# Patient Record
Sex: FEMALE | Race: WHITE | NOT HISPANIC OR LATINO | Employment: OTHER | URBAN - METROPOLITAN AREA
[De-identification: names, ages, dates, MRNs, and addresses within clinical notes are randomized per-mention and may not be internally consistent; named-entity substitution may affect disease eponyms.]

---

## 2020-03-16 ENCOUNTER — APPOINTMENT (OUTPATIENT)
Dept: CARDIOLOGY | Facility: HOSPITAL | Age: 85
End: 2020-03-16

## 2020-03-16 ENCOUNTER — APPOINTMENT (OUTPATIENT)
Dept: GENERAL RADIOLOGY | Facility: HOSPITAL | Age: 85
End: 2020-03-16

## 2020-03-16 ENCOUNTER — HOSPITAL ENCOUNTER (INPATIENT)
Facility: HOSPITAL | Age: 85
LOS: 2 days | Discharge: HOME | End: 2020-03-18
Attending: EMERGENCY MEDICINE | Admitting: INTERNAL MEDICINE

## 2020-03-16 DIAGNOSIS — I48.91 NEW ONSET A-FIB (HCC): Primary | ICD-10-CM

## 2020-03-16 DIAGNOSIS — I48.91 ATRIAL FIBRILLATION WITH RAPID VENTRICULAR RESPONSE (HCC): ICD-10-CM

## 2020-03-16 LAB
ALBUMIN SERPL-MCNC: 3.6 G/DL (ref 3.5–5.2)
ALBUMIN/GLOB SERPL: 1.2 G/DL
ALP SERPL-CCNC: 84 U/L (ref 39–117)
ALT SERPL W P-5'-P-CCNC: 19 U/L (ref 1–33)
ANION GAP SERPL CALCULATED.3IONS-SCNC: 10 MMOL/L (ref 5–15)
AORTIC DIMENSIONLESS INDEX: 0.4 (DI)
AST SERPL-CCNC: 18 U/L (ref 1–32)
BASOPHILS # BLD AUTO: 0.05 10*3/MM3 (ref 0–0.2)
BASOPHILS NFR BLD AUTO: 0.5 % (ref 0–1.5)
BH CV ECHO MEAS - AI DEC SLOPE: 295.1 CM/SEC^2
BH CV ECHO MEAS - AI MAX PG: 77.5 MMHG
BH CV ECHO MEAS - AI MAX VEL: 440.1 CM/SEC
BH CV ECHO MEAS - AI P1/2T: 436.8 MSEC
BH CV ECHO MEAS - AO MAX PG (FULL): 16.5 MMHG
BH CV ECHO MEAS - AO MAX PG: 20.6 MMHG
BH CV ECHO MEAS - AO MEAN PG (FULL): 7.3 MMHG
BH CV ECHO MEAS - AO MEAN PG: 9 MMHG
BH CV ECHO MEAS - AO ROOT AREA (BSA CORRECTED): 1.7
BH CV ECHO MEAS - AO ROOT AREA: 6.5 CM^2
BH CV ECHO MEAS - AO ROOT DIAM: 2.9 CM
BH CV ECHO MEAS - AO V2 MAX: 227 CM/SEC
BH CV ECHO MEAS - AO V2 MEAN: 140 CM/SEC
BH CV ECHO MEAS - AO V2 VTI: 36.6 CM
BH CV ECHO MEAS - AVA(I,A): 1.3 CM^2
BH CV ECHO MEAS - AVA(I,D): 1.4 CM^2
BH CV ECHO MEAS - AVA(V,A): 1.4 CM^2
BH CV ECHO MEAS - AVA(V,D): 1.4 CM^2
BH CV ECHO MEAS - BSA(HAYCOCK): 1.8 M^2
BH CV ECHO MEAS - BSA: 1.7 M^2
BH CV ECHO MEAS - BZI_BMI: 27.6 KILOGRAMS/M^2
BH CV ECHO MEAS - BZI_METRIC_HEIGHT: 160 CM
BH CV ECHO MEAS - BZI_METRIC_WEIGHT: 70.8 KG
BH CV ECHO MEAS - EDV(CUBED): 61.4 ML
BH CV ECHO MEAS - EDV(TEICH): 67.7 ML
BH CV ECHO MEAS - EF(CUBED): 72.6 %
BH CV ECHO MEAS - EF(TEICH): 65 %
BH CV ECHO MEAS - ESV(CUBED): 16.8 ML
BH CV ECHO MEAS - ESV(TEICH): 23.7 ML
BH CV ECHO MEAS - FS: 35.1 %
BH CV ECHO MEAS - IVS/LVPW: 1
BH CV ECHO MEAS - IVSD: 1.1 CM
BH CV ECHO MEAS - LA DIMENSION: 4.3 CM
BH CV ECHO MEAS - LA/AO: 1.5
BH CV ECHO MEAS - LAD MAJOR: 4.7 CM
BH CV ECHO MEAS - LAT PEAK E' VEL: 13.3 CM/SEC
BH CV ECHO MEAS - LATERAL E/E' RATIO: 7.1
BH CV ECHO MEAS - LV MASS(C)D: 130.4 GRAMS
BH CV ECHO MEAS - LV MASS(C)DI: 74.9 GRAMS/M^2
BH CV ECHO MEAS - LV MAX PG: 4.1 MMHG
BH CV ECHO MEAS - LV MEAN PG: 1.7 MMHG
BH CV ECHO MEAS - LV V1 MAX: 100.7 CM/SEC
BH CV ECHO MEAS - LV V1 MEAN: 57.9 CM/SEC
BH CV ECHO MEAS - LV V1 VTI: 15.1 CM
BH CV ECHO MEAS - LVIDD: 3.9 CM
BH CV ECHO MEAS - LVIDS: 2.6 CM
BH CV ECHO MEAS - LVOT AREA (M): 3.1 CM^2
BH CV ECHO MEAS - LVOT AREA: 3.1 CM^2
BH CV ECHO MEAS - LVOT DIAM: 2 CM
BH CV ECHO MEAS - LVPWD: 1 CM
BH CV ECHO MEAS - MED PEAK E' VEL: 5 CM/SEC
BH CV ECHO MEAS - MEDIAL E/E' RATIO: 18.4
BH CV ECHO MEAS - MV DEC SLOPE: 633.1 CM/SEC^2
BH CV ECHO MEAS - MV DEC TIME: 0.16 SEC
BH CV ECHO MEAS - MV E MAX VEL: 95.3 CM/SEC
BH CV ECHO MEAS - MV P1/2T MAX VEL: 124.9 CM/SEC
BH CV ECHO MEAS - MV P1/2T: 57.8 MSEC
BH CV ECHO MEAS - MVA P1/2T LCG: 1.8 CM^2
BH CV ECHO MEAS - MVA(P1/2T): 3.8 CM^2
BH CV ECHO MEAS - PA ACC SLOPE: 614.3 CM/SEC^2
BH CV ECHO MEAS - PA ACC TIME: 0.12 SEC
BH CV ECHO MEAS - PA PR(ACCEL): 25.1 MMHG
BH CV ECHO MEAS - RAP SYSTOLE: 3 MMHG
BH CV ECHO MEAS - RV MAX PG: 1.9 MMHG
BH CV ECHO MEAS - RV V1 MAX: 68.1 CM/SEC
BH CV ECHO MEAS - RVSP: 27 MMHG
BH CV ECHO MEAS - SI(AO): 137.2 ML/M^2
BH CV ECHO MEAS - SI(CUBED): 25.6 ML/M^2
BH CV ECHO MEAS - SI(LVOT): 27 ML/M^2
BH CV ECHO MEAS - SI(TEICH): 25.3 ML/M^2
BH CV ECHO MEAS - SV(AO): 238.7 ML
BH CV ECHO MEAS - SV(CUBED): 44.6 ML
BH CV ECHO MEAS - SV(LVOT): 47 ML
BH CV ECHO MEAS - SV(TEICH): 44 ML
BH CV ECHO MEAS - TAPSE (>1.6): 1.6 CM2
BH CV ECHO MEAS - TR MAX PG: 24 MMHG
BH CV ECHO MEAS - TR MAX VEL: 243.1 CM/SEC
BH CV ECHO MEASUREMENTS AVERAGE E/E' RATIO: 10.42
BH CV XLRA - RV BASE: 3.7 CM
BH CV XLRA - RV LENGTH: 6 CM
BH CV XLRA - RV MID: 2.6 CM
BH CV XLRA - TDI S': 12.1 CM/SEC
BILIRUB SERPL-MCNC: 0.3 MG/DL (ref 0.2–1.2)
BUN BLD-MCNC: 24 MG/DL (ref 8–23)
BUN/CREAT SERPL: 20.5 (ref 7–25)
CALCIUM SPEC-SCNC: 9.3 MG/DL (ref 8.6–10.5)
CHLORIDE SERPL-SCNC: 102 MMOL/L (ref 98–107)
CO2 SERPL-SCNC: 28 MMOL/L (ref 22–29)
CREAT BLD-MCNC: 1.17 MG/DL (ref 0.57–1)
D DIMER PPP FEU-MCNC: 3.4 MCGFEU/ML (ref 0–0.56)
DEPRECATED RDW RBC AUTO: 43.8 FL (ref 37–54)
EOSINOPHIL # BLD AUTO: 0.11 10*3/MM3 (ref 0–0.4)
EOSINOPHIL NFR BLD AUTO: 1.1 % (ref 0.3–6.2)
ERYTHROCYTE [DISTWIDTH] IN BLOOD BY AUTOMATED COUNT: 13.4 % (ref 12.3–15.4)
GFR SERPL CREATININE-BSD FRML MDRD: 44 ML/MIN/1.73
GLOBULIN UR ELPH-MCNC: 3.1 GM/DL
GLUCOSE BLD-MCNC: 99 MG/DL (ref 65–99)
HCT VFR BLD AUTO: 35.8 % (ref 34–46.6)
HGB BLD-MCNC: 11 G/DL (ref 12–15.9)
HOLD SPECIMEN: NORMAL
HOLD SPECIMEN: NORMAL
IMM GRANULOCYTES # BLD AUTO: 0.12 10*3/MM3 (ref 0–0.05)
IMM GRANULOCYTES NFR BLD AUTO: 1.2 % (ref 0–0.5)
LEFT ATRIUM VOLUME INDEX: 45 ML/M^2
LEFT ATRIUM VOLUME: 40 ML
LV EF 2D ECHO EST: 55 %
LYMPHOCYTES # BLD AUTO: 1.14 10*3/MM3 (ref 0.7–3.1)
LYMPHOCYTES NFR BLD AUTO: 11.4 % (ref 19.6–45.3)
MAGNESIUM SERPL-MCNC: 2.4 MG/DL (ref 1.6–2.4)
MAXIMAL PREDICTED HEART RATE: 133 BPM
MCH RBC QN AUTO: 27.4 PG (ref 26.6–33)
MCHC RBC AUTO-ENTMCNC: 30.7 G/DL (ref 31.5–35.7)
MCV RBC AUTO: 89.1 FL (ref 79–97)
MONOCYTES # BLD AUTO: 0.73 10*3/MM3 (ref 0.1–0.9)
MONOCYTES NFR BLD AUTO: 7.3 % (ref 5–12)
NEUTROPHILS # BLD AUTO: 7.88 10*3/MM3 (ref 1.7–7)
NEUTROPHILS NFR BLD AUTO: 78.5 % (ref 42.7–76)
NRBC BLD AUTO-RTO: 0 /100 WBC (ref 0–0.2)
NT-PROBNP SERPL-MCNC: 4859 PG/ML (ref 5–1800)
NT-PROBNP SERPL-MCNC: 5426 PG/ML (ref 5–1800)
PLATELET # BLD AUTO: 355 10*3/MM3 (ref 140–450)
PMV BLD AUTO: 9.8 FL (ref 6–12)
POTASSIUM BLD-SCNC: 4.7 MMOL/L (ref 3.5–5.2)
PROT SERPL-MCNC: 6.7 G/DL (ref 6–8.5)
RBC # BLD AUTO: 4.02 10*6/MM3 (ref 3.77–5.28)
SODIUM BLD-SCNC: 140 MMOL/L (ref 136–145)
STRESS TARGET HR: 113 BPM
TROPONIN T SERPL-MCNC: <0.01 NG/ML (ref 0–0.03)
TSH SERPL DL<=0.05 MIU/L-ACNC: 1.73 UIU/ML (ref 0.27–4.2)
WBC NRBC COR # BLD: 10.03 10*3/MM3 (ref 3.4–10.8)
WHOLE BLOOD HOLD SPECIMEN: NORMAL
WHOLE BLOOD HOLD SPECIMEN: NORMAL

## 2020-03-16 PROCEDURE — 85025 COMPLETE CBC W/AUTO DIFF WBC: CPT | Performed by: EMERGENCY MEDICINE

## 2020-03-16 PROCEDURE — 93005 ELECTROCARDIOGRAM TRACING: CPT | Performed by: EMERGENCY MEDICINE

## 2020-03-16 PROCEDURE — 80053 COMPREHEN METABOLIC PANEL: CPT | Performed by: EMERGENCY MEDICINE

## 2020-03-16 PROCEDURE — 83880 ASSAY OF NATRIURETIC PEPTIDE: CPT | Performed by: NURSE PRACTITIONER

## 2020-03-16 PROCEDURE — 85379 FIBRIN DEGRADATION QUANT: CPT | Performed by: INTERNAL MEDICINE

## 2020-03-16 PROCEDURE — 93306 TTE W/DOPPLER COMPLETE: CPT

## 2020-03-16 PROCEDURE — 84484 ASSAY OF TROPONIN QUANT: CPT | Performed by: EMERGENCY MEDICINE

## 2020-03-16 PROCEDURE — 71045 X-RAY EXAM CHEST 1 VIEW: CPT

## 2020-03-16 PROCEDURE — 99223 1ST HOSP IP/OBS HIGH 75: CPT | Performed by: INTERNAL MEDICINE

## 2020-03-16 PROCEDURE — 84443 ASSAY THYROID STIM HORMONE: CPT | Performed by: EMERGENCY MEDICINE

## 2020-03-16 PROCEDURE — 83880 ASSAY OF NATRIURETIC PEPTIDE: CPT | Performed by: EMERGENCY MEDICINE

## 2020-03-16 PROCEDURE — 25010000002 DIGOXIN PER 500 MCG: Performed by: INTERNAL MEDICINE

## 2020-03-16 PROCEDURE — 83735 ASSAY OF MAGNESIUM: CPT | Performed by: EMERGENCY MEDICINE

## 2020-03-16 PROCEDURE — 93306 TTE W/DOPPLER COMPLETE: CPT | Performed by: INTERNAL MEDICINE

## 2020-03-16 PROCEDURE — 25010000002 FUROSEMIDE PER 20 MG: Performed by: INTERNAL MEDICINE

## 2020-03-16 PROCEDURE — 99285 EMERGENCY DEPT VISIT HI MDM: CPT

## 2020-03-16 RX ORDER — FUROSEMIDE 20 MG/1
20 TABLET ORAL 2 TIMES DAILY
COMMUNITY
End: 2020-03-18 | Stop reason: HOSPADM

## 2020-03-16 RX ORDER — DIGOXIN 0.25 MG/ML
250 INJECTION INTRAMUSCULAR; INTRAVENOUS ONCE
Status: COMPLETED | OUTPATIENT
Start: 2020-03-16 | End: 2020-03-16

## 2020-03-16 RX ORDER — POTASSIUM CHLORIDE 750 MG/1
20 CAPSULE, EXTENDED RELEASE ORAL DAILY
Status: DISCONTINUED | OUTPATIENT
Start: 2020-03-16 | End: 2020-03-18 | Stop reason: HOSPADM

## 2020-03-16 RX ORDER — FUROSEMIDE 10 MG/ML
40 INJECTION INTRAMUSCULAR; INTRAVENOUS ONCE
Status: COMPLETED | OUTPATIENT
Start: 2020-03-16 | End: 2020-03-16

## 2020-03-16 RX ORDER — VITAMIN E 268 MG
400 CAPSULE ORAL DAILY
COMMUNITY

## 2020-03-16 RX ORDER — CALCIUM CARBONATE 200(500)MG
1 TABLET,CHEWABLE ORAL DAILY
COMMUNITY

## 2020-03-16 RX ORDER — DILTIAZEM HYDROCHLORIDE 5 MG/ML
10 INJECTION INTRAVENOUS ONCE
Status: COMPLETED | OUTPATIENT
Start: 2020-03-16 | End: 2020-03-16

## 2020-03-16 RX ORDER — SODIUM CHLORIDE 0.9 % (FLUSH) 0.9 %
10 SYRINGE (ML) INJECTION AS NEEDED
Status: DISCONTINUED | OUTPATIENT
Start: 2020-03-16 | End: 2020-03-18 | Stop reason: HOSPADM

## 2020-03-16 RX ORDER — SENNOSIDES 8.6 MG
CAPSULE ORAL
COMMUNITY

## 2020-03-16 RX ORDER — TORSEMIDE 20 MG/1
20 TABLET ORAL DAILY
Status: DISCONTINUED | OUTPATIENT
Start: 2020-03-16 | End: 2020-03-16

## 2020-03-16 RX ORDER — DILTIAZEM HCL IN NACL,ISO-OSM 125 MG/125
5-15 PLASTIC BAG, INJECTION (ML) INTRAVENOUS
Status: DISCONTINUED | OUTPATIENT
Start: 2020-03-16 | End: 2020-03-18 | Stop reason: HOSPADM

## 2020-03-16 RX ORDER — BENAZEPRIL HYDROCHLORIDE 20 MG/1
20 TABLET ORAL DAILY
COMMUNITY
End: 2020-03-18 | Stop reason: HOSPADM

## 2020-03-16 RX ADMIN — DIGOXIN 250 MCG: 0.25 INJECTION INTRAMUSCULAR; INTRAVENOUS at 21:13

## 2020-03-16 RX ADMIN — DIGOXIN 250 MCG: 0.25 INJECTION INTRAMUSCULAR; INTRAVENOUS at 16:05

## 2020-03-16 RX ADMIN — APIXABAN 5 MG: 5 TABLET, FILM COATED ORAL at 21:13

## 2020-03-16 RX ADMIN — Medication 5 MG/HR: at 12:47

## 2020-03-16 RX ADMIN — POTASSIUM CHLORIDE 20 MEQ: 10 CAPSULE, COATED, EXTENDED RELEASE ORAL at 16:05

## 2020-03-16 RX ADMIN — FUROSEMIDE 40 MG: 10 INJECTION, SOLUTION INTRAMUSCULAR; INTRAVENOUS at 16:05

## 2020-03-16 RX ADMIN — APIXABAN 5 MG: 5 TABLET, FILM COATED ORAL at 12:42

## 2020-03-16 RX ADMIN — DILTIAZEM HYDROCHLORIDE 10 MG: 5 INJECTION INTRAVENOUS at 12:43

## 2020-03-16 RX ADMIN — METOPROLOL TARTRATE 25 MG: 25 TABLET, FILM COATED ORAL at 21:13

## 2020-03-16 NOTE — ED PROVIDER NOTES
Subjective   Ms. Amanda Glover is a 87 y.o. female who presents to the ED with c/o palpitations. Her daughter came to her house this morning to assist her with medications and when she checked her blood pressure it was lower than normal and she noted an irregular heart rate. She was seen by her PCP this morning and ECG showed atrial fibrillation with a heart rate of 130 and her blood pressure was 77 systolic so she was directed to present to the ED. Patient and family are unsure exactly when this onset. Patient reports she feels normal today and denies nausea, lightheadedness, chest pain, or shortness of breath. She does not have a history of atrial fibrillation. She had a fall in January which caused a femoral fracture but she has been recovering. There are no other acute symptoms at this time.      History provided by:  Patient  Palpitations   Palpitations quality:  Fast  Onset quality:  Insidious  Duration:  1 day  Timing:  Constant  Progression:  Unchanged  Chronicity:  New  Relieved by:  None tried  Worsened by:  Nothing  Ineffective treatments:  None tried  Associated symptoms: no chest pain, no dizziness, no nausea, no shortness of breath and no vomiting        Review of Systems   Constitutional: Negative for fatigue.   Eyes: Negative for visual disturbance.   Respiratory: Negative for shortness of breath.    Cardiovascular: Positive for palpitations and leg swelling. Negative for chest pain.   Gastrointestinal: Negative for diarrhea, nausea and vomiting.   Neurological: Negative for dizziness and light-headedness.   All other systems reviewed and are negative.      No past medical history on file.    Allergies   Allergen Reactions   • Influenza Vaccine Live Other (See Comments)     Patient unsure       No past surgical history on file.    No family history on file.    Social History     Socioeconomic History   • Marital status:      Spouse name: Not on file   • Number of children: Not on file   • Years  of education: Not on file   • Highest education level: Not on file         Objective   Physical Exam   Constitutional: She is oriented to person, place, and time. She appears well-developed and well-nourished. No distress.   HENT:   Head: Normocephalic and atraumatic.   Nose: Nose normal.   Eyes: Conjunctivae are normal. No scleral icterus.   Neck: Normal range of motion. Neck supple.   Cardiovascular: Normal heart sounds. An irregularly irregular rhythm present. Tachycardia present.   No murmur heard.  Pulmonary/Chest: Effort normal and breath sounds normal. No respiratory distress.   Abdominal: Soft. There is no tenderness.   Musculoskeletal: Normal range of motion. She exhibits edema (2+ bilateral lower extremities).   Neurological: She is alert and oriented to person, place, and time.   Skin: Skin is warm and dry.   Psychiatric: She has a normal mood and affect. Her behavior is normal.   Nursing note and vitals reviewed.      Procedures         ED Course     Recent Results (from the past 24 hour(s))   Comprehensive Metabolic Panel    Collection Time: 03/16/20 12:17 PM   Result Value Ref Range    Glucose 99 65 - 99 mg/dL    BUN 24 (H) 8 - 23 mg/dL    Creatinine 1.17 (H) 0.57 - 1.00 mg/dL    Sodium 140 136 - 145 mmol/L    Potassium 4.7 3.5 - 5.2 mmol/L    Chloride 102 98 - 107 mmol/L    CO2 28.0 22.0 - 29.0 mmol/L    Calcium 9.3 8.6 - 10.5 mg/dL    Total Protein 6.7 6.0 - 8.5 g/dL    Albumin 3.60 3.50 - 5.20 g/dL    ALT (SGPT) 19 1 - 33 U/L    AST (SGOT) 18 1 - 32 U/L    Alkaline Phosphatase 84 39 - 117 U/L    Total Bilirubin 0.3 0.2 - 1.2 mg/dL    eGFR Non African Amer 44 (L) >60 mL/min/1.73    Globulin 3.1 gm/dL    A/G Ratio 1.2 g/dL    BUN/Creatinine Ratio 20.5 7.0 - 25.0    Anion Gap 10.0 5.0 - 15.0 mmol/L   Magnesium    Collection Time: 03/16/20 12:17 PM   Result Value Ref Range    Magnesium 2.4 1.6 - 2.4 mg/dL   Troponin    Collection Time: 03/16/20 12:17 PM   Result Value Ref Range    Troponin T <0.010  0.000 - 0.030 ng/mL   TSH    Collection Time: 03/16/20 12:17 PM   Result Value Ref Range    TSH 1.730 0.270 - 4.200 uIU/mL   BNP    Collection Time: 03/16/20 12:17 PM   Result Value Ref Range    proBNP 4,859.0 (H) 5.0-1,800.0 pg/mL   Light Blue Top    Collection Time: 03/16/20 12:17 PM   Result Value Ref Range    Extra Tube hold for add-on    Green Top (Gel)    Collection Time: 03/16/20 12:17 PM   Result Value Ref Range    Extra Tube Hold for add-ons.    Lavender Top    Collection Time: 03/16/20 12:17 PM   Result Value Ref Range    Extra Tube hold for add-on    Gold Top - SST    Collection Time: 03/16/20 12:17 PM   Result Value Ref Range    Extra Tube Hold for add-ons.    CBC Auto Differential    Collection Time: 03/16/20 12:17 PM   Result Value Ref Range    WBC 10.03 3.40 - 10.80 10*3/mm3    RBC 4.02 3.77 - 5.28 10*6/mm3    Hemoglobin 11.0 (L) 12.0 - 15.9 g/dL    Hematocrit 35.8 34.0 - 46.6 %    MCV 89.1 79.0 - 97.0 fL    MCH 27.4 26.6 - 33.0 pg    MCHC 30.7 (L) 31.5 - 35.7 g/dL    RDW 13.4 12.3 - 15.4 %    RDW-SD 43.8 37.0 - 54.0 fl    MPV 9.8 6.0 - 12.0 fL    Platelets 355 140 - 450 10*3/mm3    Neutrophil % 78.5 (H) 42.7 - 76.0 %    Lymphocyte % 11.4 (L) 19.6 - 45.3 %    Monocyte % 7.3 5.0 - 12.0 %    Eosinophil % 1.1 0.3 - 6.2 %    Basophil % 0.5 0.0 - 1.5 %    Immature Grans % 1.2 (H) 0.0 - 0.5 %    Neutrophils, Absolute 7.88 (H) 1.70 - 7.00 10*3/mm3    Lymphocytes, Absolute 1.14 0.70 - 3.10 10*3/mm3    Monocytes, Absolute 0.73 0.10 - 0.90 10*3/mm3    Eosinophils, Absolute 0.11 0.00 - 0.40 10*3/mm3    Basophils, Absolute 0.05 0.00 - 0.20 10*3/mm3    Immature Grans, Absolute 0.12 (H) 0.00 - 0.05 10*3/mm3    nRBC 0.0 0.0 - 0.2 /100 WBC   D-dimer, Quantitative    Collection Time: 03/16/20 12:17 PM   Result Value Ref Range    D-Dimer, Quantitative 3.40 (H) 0.00 - 0.56 MCGFEU/mL     Note: In addition to lab results from this visit, the labs listed above may include labs taken at another facility or during a  different encounter within the last 24 hours. Please correlate lab times with ED admission and discharge times for further clarification of the services performed during this visit.    XR Chest 1 View   Preliminary Result   1.  Borderline cardiomegaly.   2.  Mild nonspecific basilar interstitial changes and perhaps minimal   pleural effusions. No other evidence of active chest disease is seen.       D:  03/16/2020   E:  03/16/2020                     Vitals:    03/16/20 1430 03/16/20 1450 03/16/20 1451 03/16/20 1500   BP: 121/82 112/92  113/95   BP Location:       Patient Position:       Pulse: (!) 130  (!) 134 (!) 142   Resp:       Temp:       TempSrc:       SpO2: 97%  95% 95%   Weight:       Height:         Medications   sodium chloride 0.9 % flush 10 mL (has no administration in time range)   dilTIAZem (CARDIZEM) 125 mg in 125 mL 0.7% sodium chloride (1 mg/mL) infusion (0 mg/hr Intravenous Stopped 3/16/20 1333)   apixaban (ELIQUIS) tablet 5 mg (has no administration in time range)   metoprolol tartrate (LOPRESSOR) tablet 25 mg (has no administration in time range)   torsemide (DEMADEX) tablet 20 mg (has no administration in time range)   potassium chloride (MICRO-K) CR capsule 20 mEq (has no administration in time range)   apixaban (ELIQUIS) tablet 5 mg (5 mg Oral Given 3/16/20 1242)   dilTIAZem (CARDIZEM) injection 10 mg (10 mg Intravenous Given 3/16/20 1243)     ECG/EMG Results (last 24 hours)     Procedure Component Value Units Date/Time    ECG 12 Lead [[id number]] Collected:  03/16/20 1210     Updated:  03/16/20 1220        ECG 12 Lead           Cardiology consult placed through [place] at 1322.     Cardiology to admit to tele to achieve rate control.                            [id number] Score (for atrial fibrillation stroke risk) reviewed and/or performed as part of the patient evaluation and treatment planning process.  The result associated with this review/performance is: 3           MDM    Final  diagnoses:   New onset a-fib (CMS/HCC)   Atrial fibrillation with rapid ventricular response (CMS/HCC)       Documentation assistance provided by dianne Quezada.  Information recorded by the scribe was done at my direction and has been verified and validated by me.     Zeke Quezada  03/16/20 1302       Mariana Guardado  03/16/20 [id number]       Elissa Israel APRN  03/16/20 [id number]

## 2020-03-16 NOTE — H&P
Amanda Glover  [id number]  10/31/1932   LOS: 0 days   Patient Care Team:  PHYSICIAN: Denis Lr MD   ORTHOPEDIC SURGEON: Fernando Cornell MD    Ms. 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     Chief Complaint:  PAF with RVR    Problem List:  1. New onset paroxysmal atrial fibrillation with RVR  a. CHADsVasc 3, now on Eliquis  b. Asymptomatic  2. Hypertension  3. Lower extremity edema  4. Surgical history:  a. Appendectomy  b. Hysterectomy  c. Hernia repair  d. Left hip fracture      Allergies   Allergen Reactions   • Influenza Vaccine Live Other (See Comments)     Patient unsure       (Not in a hospital admission)  Scheduled Meds:   Continuous Infusions:    dilTIAZem 5-15 mg/hr Last Rate: Stopped (03/16/20 1333)     PRN Meds:.sodium chloride       History of Present Illness:    Patient is an 87 year old white female who presents to [facility] ED after her daughter took her blood pressure and noticed that her heart rate was elevated and her blood pressure was lower than normal. She was seen by her PCP this morning and the ECG demonstrated atrial fib with RVR and rates 130bpm. Her blood pressure at that time was 77 systolic and she was directed to go to the ED. She denies chest pain, SOB, palpitations, nausea, dizziness, presyncope, or syncope. She had a mechanical fall in January 2020 where she sustained a femoral fracture and had hip surgery at [facility].  Her daughter denies during her mother's hospitalization that she went into atrial fib anytime at [facility] when she was there for her surgery.  However, she did have an echocardiogram preoperatively at that time and was not told of any abnormalities.  The patient denies any MIs, stress tests, heart catheterizations, heart monitors, diabetes mellitus, CVAs, TIAs, seizures, DVTs, PEs, GERD, thyroid dysfunction, cardiomegaly, orthopnea, COPD, asthma, rheumatic fever, or arrhythmias.  She was given  "a diltiazem 10mg IV bolus and Eliquis 5mg in the ED. The patient is unaware that she is in atrial fib with RVR and would like to go home.  She has never been a smoker.  She has been told that she has a heart murmur and possibly mitral valve prolapse.  Since the patient had hip surgery in January 2020, she has noticed that she has had bilateral lower extremity edema and she has been compliant with her Lasix daily.  She takes 20 mg at home and during 1 of her rehab stay is required extra doses and her daughter state that did reduce the edema.  However it is fairly persistent on 20 mg daily at home    Cardiac risk factors: advanced age (older than 55 for men, 65 for women), dyslipidemia and hypertension.    Social History     Socioeconomic History   • Marital status:      Spouse name: Not on file   • Number of children: 7   • Years of education: Not on file   • Highest education level: Not on file     No family history on file.    Review of Systems  10 point review of systems was completed, positives outlined in the HPI, and otherwise all other systems are negative.      Objective:       Physical Exam  /95   Pulse (!) 142   Temp 98.8 °F (37.1 °C) (Oral)   Resp 20   Ht 160 cm (63\")   Wt 70.8 kg (156 lb)   SpO2 95%   BMI 27.63 kg/m²       03/16/20  1203   Weight: 70.8 kg (156 lb)     Body mass index is 27.63 kg/m².  No intake or output data in the 24 hours ending 03/16/20 1506    General Appearance:  Alert, cooperative, no distress, appears stated age   Head:  Normocephalic, without obvious abnormality, atraumatic   Neck: Supple, symmetrical, trachea midline, no adenopathy, thyroid: not enlarged, symmetric, no tenderness/mass/nodules, no carotid bruits or JVD   Lungs:   Clear to auscultation bilaterally, respirations unlabored   Heart:   Atrial fib with RVR, S1, S2 normal, grade 2/6 murmur, rub or gallop   Abdomen:   Soft, non-tender, no masses, no organomegaly, bowel sounds audible x4   Extremities: " 2+ edema, normal range of motion   Pulses: 2+ and symmetric   Skin: Skin color, texture, turgor normal, no rashes or lesions   Neurologic: Normal       Cardiographics  EKG 3/16/2020:  Atrial fibrillation  Abnormal ECG  No previous ECGs available  No acute ischemic change  Imaging  Chest x-ray 3/16/2020:  1.  Borderline cardiomegaly.  2.  Mild nonspecific basilar interstitial changes and perhaps minimal  pleural effusions. No other evidence of active chest disease is seen.       Lab Review   Results from last 7 days   Lab Units 03/16/20  1217   SODIUM mmol/L 140   POTASSIUM mmol/L 4.7   CHLORIDE mmol/L 102   CO2 mmol/L 28.0   BUN mg/dL 24*   CREATININE mg/dL 1.17*   GLUCOSE mg/dL 99   CALCIUM mg/dL 9.3     Results from last 7 days   Lab Units 03/16/20  1217   WBC 10*3/mm3 10.03   HEMOGLOBIN g/dL 11.0*   HEMATOCRIT % 35.8   PLATELETS 10*3/mm3 355             Results from last 7 days   Lab Units 03/16/20  1217   TROPONIN T ng/mL <0.010         Assessment:   Patient with asymptomatic atrial fib with RVR in the setting of marginal blood pressure.  She preferred to not be cardioverted if possible.  She is now anticoagulated with Eliquis and verbalized understanding that she will need to be on this forevermore for stroke prophylaxis.  I suspect that the patient may have been in atrial fibrillation prior to today in view of her recent left hip surgery and that she is asymptomatic.  We will just try and rate control her. She has new lower extremity edema so I will order an echocardiogram as well as a proBNP and d dimer to rule out heart failure or clot formation.          Plan:   1. Echocardiogram  2. Metoprolol tartrate 25mg bid for rate control.  Since blood pressure cannot tolerate diltiazem drip will give digoxin 250 mcg x2 doses  3. Defer YANIQUE/ECV at this time, reevaluate if unable to obtain effective rate control  4. Eliquis 5mg bid after discharge  5.  1 dose Lasix 40 mg IV x1 now, possible transition to torsemide on  discharge  6. Micro K 20mEq daily  7. D dimer, proBNP, lower extremity duplex  8. Observe overnight   9. Full code  10. Cardiac diet  11. BMP, ECG in morning  12. Vitals q 4, continual telemetry monitoring  13. Cardizem gtt for sustained HR >130bpm  14. May consider a one time dose of metolazone 5mg x once for increased SOB/edema  15. Npo after midnight except for meds in the event that YANIQUE and cardioversion is needed    Scribed for Zen Chand MD by Valerie Tinsley, PATRICIO. 3/16/2020  15:06    IDenis M.D., personally performed the services described in this documentation as scribed by the above named individual in my presence, and it is both accurate and complete.  3/16/2020  16:02

## 2020-03-16 NOTE — PLAN OF CARE
Problem: Patient Care Overview  Goal: Plan of Care Review  3/16/2020 1637 by Ashley Parker RN  Outcome: Ongoing (interventions implemented as appropriate)  Flowsheets (Taken 3/16/2020 1637)  Progress: no change  Plan of Care Reviewed With: daughter; patient  Outcome Summary: afib rvr, iv dig given. ecv tmrw     Problem: Patient Care Overview  Goal: Plan of Care Review  3/16/2020 1637 by Ashley Parker RN  Outcome: Ongoing (interventions implemented as appropriate)  Flowsheets (Taken 3/16/2020 1637)  Progress: no change  Plan of Care Reviewed With: daughter; patient  Outcome Summary: afib rvr, iv dig given. ecv tmrw

## 2020-03-17 ENCOUNTER — APPOINTMENT (OUTPATIENT)
Dept: CARDIOLOGY | Facility: HOSPITAL | Age: 85
End: 2020-03-17

## 2020-03-17 PROBLEM — I82.433 ACUTE DEEP VEIN THROMBOSIS (DVT) OF POPLITEAL VEIN OF BOTH LOWER EXTREMITIES: Status: ACTIVE | Noted: 2020-03-17

## 2020-03-17 PROBLEM — I82.433 ACUTE DEEP VEIN THROMBOSIS (DVT) OF POPLITEAL VEIN OF BOTH LOWER EXTREMITIES (HCC): Status: ACTIVE | Noted: 2020-03-17

## 2020-03-17 LAB
ANION GAP SERPL CALCULATED.3IONS-SCNC: 11 MMOL/L (ref 5–15)
BH CV LOW VAS LEFT PERONEAL VESSEL: 1
BH CV LOW VAS LEFT POPLITEAL SPONT: 1
BH CV LOW VAS RIGHT PERONEAL VESSEL: 1
BH CV LOW VAS RIGHT POPLITEAL SPONT: 1
BH CV LOW VAS RIGHT POSTERIOR TIBIAL VESSEL: 1
BH CV LOWER VASCULAR LEFT COMMON FEMORAL AUGMENT: NORMAL
BH CV LOWER VASCULAR LEFT COMMON FEMORAL COMPRESS: NORMAL
BH CV LOWER VASCULAR LEFT COMMON FEMORAL PHASIC: NORMAL
BH CV LOWER VASCULAR LEFT COMMON FEMORAL SPONT: NORMAL
BH CV LOWER VASCULAR LEFT DISTAL FEMORAL AUGMENT: NORMAL
BH CV LOWER VASCULAR LEFT DISTAL FEMORAL COMPRESS: NORMAL
BH CV LOWER VASCULAR LEFT DISTAL FEMORAL PHASIC: NORMAL
BH CV LOWER VASCULAR LEFT DISTAL FEMORAL SPONT: NORMAL
BH CV LOWER VASCULAR LEFT GASTRONEMIUS COMPRESS: NORMAL
BH CV LOWER VASCULAR LEFT GREATER SAPH AK COMPRESS: NORMAL
BH CV LOWER VASCULAR LEFT GREATER SAPH BK COMPRESS: NORMAL
BH CV LOWER VASCULAR LEFT LESSER SAPH COMPRESS: NORMAL
BH CV LOWER VASCULAR LEFT MID FEMORAL AUGMENT: NORMAL
BH CV LOWER VASCULAR LEFT MID FEMORAL COMPRESS: NORMAL
BH CV LOWER VASCULAR LEFT MID FEMORAL PHASIC: NORMAL
BH CV LOWER VASCULAR LEFT MID FEMORAL SPONT: NORMAL
BH CV LOWER VASCULAR LEFT PERONEAL COMPRESS: NORMAL
BH CV LOWER VASCULAR LEFT POPLITEAL COMPRESS: NORMAL
BH CV LOWER VASCULAR LEFT POPLITEAL PHASIC: NORMAL
BH CV LOWER VASCULAR LEFT POPLITEAL SPONT: NORMAL
BH CV LOWER VASCULAR LEFT POSTERIOR TIBIAL COMPRESS: NORMAL
BH CV LOWER VASCULAR LEFT PROFUNDA FEMORAL COMPRESS: NORMAL
BH CV LOWER VASCULAR LEFT PROFUNDA FEMORAL PHASIC: NORMAL
BH CV LOWER VASCULAR LEFT PROFUNDA FEMORAL SPONT: NORMAL
BH CV LOWER VASCULAR LEFT PROXIMAL FEMORAL AUGMENT: NORMAL
BH CV LOWER VASCULAR LEFT PROXIMAL FEMORAL COMPRESS: NORMAL
BH CV LOWER VASCULAR LEFT PROXIMAL FEMORAL PHASIC: NORMAL
BH CV LOWER VASCULAR LEFT PROXIMAL FEMORAL SPONT: NORMAL
BH CV LOWER VASCULAR LEFT SAPHENOFEMORAL JUNCTION COMPRESS: NORMAL
BH CV LOWER VASCULAR LEFT SAPHENOFEMORAL JUNCTION PHASIC: NORMAL
BH CV LOWER VASCULAR LEFT SAPHENOFEMORAL JUNCTION SPONT: NORMAL
BH CV LOWER VASCULAR LEFT SOLEAL COMPRESS: NORMAL
BH CV LOWER VASCULAR RIGHT COMMON FEMORAL AUGMENT: NORMAL
BH CV LOWER VASCULAR RIGHT COMMON FEMORAL COMPRESS: NORMAL
BH CV LOWER VASCULAR RIGHT COMMON FEMORAL PHASIC: NORMAL
BH CV LOWER VASCULAR RIGHT COMMON FEMORAL SPONT: NORMAL
BH CV LOWER VASCULAR RIGHT DISTAL FEMORAL AUGMENT: NORMAL
BH CV LOWER VASCULAR RIGHT DISTAL FEMORAL COMPRESS: NORMAL
BH CV LOWER VASCULAR RIGHT DISTAL FEMORAL PHASIC: NORMAL
BH CV LOWER VASCULAR RIGHT DISTAL FEMORAL SPONT: NORMAL
BH CV LOWER VASCULAR RIGHT GASTRONEMIUS COMPRESS: NORMAL
BH CV LOWER VASCULAR RIGHT GREATER SAPH AK COMPRESS: NORMAL
BH CV LOWER VASCULAR RIGHT GREATER SAPH BK COMPRESS: NORMAL
BH CV LOWER VASCULAR RIGHT LESSER SAPH COMPRESS: NORMAL
BH CV LOWER VASCULAR RIGHT MID FEMORAL AUGMENT: NORMAL
BH CV LOWER VASCULAR RIGHT MID FEMORAL COMPRESS: NORMAL
BH CV LOWER VASCULAR RIGHT MID FEMORAL PHASIC: NORMAL
BH CV LOWER VASCULAR RIGHT MID FEMORAL SPONT: NORMAL
BH CV LOWER VASCULAR RIGHT PERONEAL COMPRESS: NORMAL
BH CV LOWER VASCULAR RIGHT POPLITEAL COMPRESS: NORMAL
BH CV LOWER VASCULAR RIGHT POPLITEAL PHASIC: NORMAL
BH CV LOWER VASCULAR RIGHT POPLITEAL SPONT: NORMAL
BH CV LOWER VASCULAR RIGHT POSTERIOR TIBIAL COMPRESS: NORMAL
BH CV LOWER VASCULAR RIGHT PROFUNDA FEMORAL COMPRESS: NORMAL
BH CV LOWER VASCULAR RIGHT PROFUNDA FEMORAL PHASIC: NORMAL
BH CV LOWER VASCULAR RIGHT PROFUNDA FEMORAL SPONT: NORMAL
BH CV LOWER VASCULAR RIGHT PROXIMAL FEMORAL AUGMENT: NORMAL
BH CV LOWER VASCULAR RIGHT PROXIMAL FEMORAL COMPRESS: NORMAL
BH CV LOWER VASCULAR RIGHT PROXIMAL FEMORAL PHASIC: NORMAL
BH CV LOWER VASCULAR RIGHT PROXIMAL FEMORAL SPONT: NORMAL
BH CV LOWER VASCULAR RIGHT SAPHENOFEMORAL JUNCTION COMPRESS: NORMAL
BH CV LOWER VASCULAR RIGHT SAPHENOFEMORAL JUNCTION PHASIC: NORMAL
BH CV LOWER VASCULAR RIGHT SAPHENOFEMORAL JUNCTION SPONT: NORMAL
BH CV LOWER VASCULAR RIGHT SOLEAL COMPRESS: NORMAL
BUN BLD-MCNC: 23 MG/DL (ref 8–23)
BUN/CREAT SERPL: 21.9 (ref 7–25)
CALCIUM SPEC-SCNC: 9.3 MG/DL (ref 8.6–10.5)
CHLORIDE SERPL-SCNC: 103 MMOL/L (ref 98–107)
CO2 SERPL-SCNC: 27 MMOL/L (ref 22–29)
CREAT BLD-MCNC: 1.05 MG/DL (ref 0.57–1)
GFR SERPL CREATININE-BSD FRML MDRD: 50 ML/MIN/1.73
GLUCOSE BLD-MCNC: 100 MG/DL (ref 65–99)
POTASSIUM BLD-SCNC: 4.6 MMOL/L (ref 3.5–5.2)
SODIUM BLD-SCNC: 141 MMOL/L (ref 136–145)

## 2020-03-17 PROCEDURE — 93005 ELECTROCARDIOGRAM TRACING: CPT | Performed by: INTERNAL MEDICINE

## 2020-03-17 PROCEDURE — 93970 EXTREMITY STUDY: CPT

## 2020-03-17 PROCEDURE — 93970 EXTREMITY STUDY: CPT | Performed by: INTERNAL MEDICINE

## 2020-03-17 PROCEDURE — 80048 BASIC METABOLIC PNL TOTAL CA: CPT | Performed by: NURSE PRACTITIONER

## 2020-03-17 PROCEDURE — 93010 ELECTROCARDIOGRAM REPORT: CPT | Performed by: INTERNAL MEDICINE

## 2020-03-17 PROCEDURE — 99233 SBSQ HOSP IP/OBS HIGH 50: CPT | Performed by: INTERNAL MEDICINE

## 2020-03-17 PROCEDURE — 25010000002 DIGOXIN PER 500 MCG: Performed by: INTERNAL MEDICINE

## 2020-03-17 RX ORDER — DIGOXIN 0.25 MG/ML
250 INJECTION INTRAMUSCULAR; INTRAVENOUS ONCE
Status: COMPLETED | OUTPATIENT
Start: 2020-03-17 | End: 2020-03-17

## 2020-03-17 RX ORDER — DIGOXIN 125 MCG
125 TABLET ORAL DAILY
Status: DISCONTINUED | OUTPATIENT
Start: 2020-03-18 | End: 2020-03-18

## 2020-03-17 RX ORDER — METOPROLOL TARTRATE 50 MG/1
50 TABLET, FILM COATED ORAL EVERY 12 HOURS SCHEDULED
Status: DISCONTINUED | OUTPATIENT
Start: 2020-03-17 | End: 2020-03-18

## 2020-03-17 RX ADMIN — APIXABAN 5 MG: 5 TABLET, FILM COATED ORAL at 08:22

## 2020-03-17 RX ADMIN — APIXABAN 10 MG: 5 TABLET, FILM COATED ORAL at 20:38

## 2020-03-17 RX ADMIN — Medication 5 MG/HR: at 22:16

## 2020-03-17 RX ADMIN — DIGOXIN 250 MCG: 0.25 INJECTION INTRAMUSCULAR; INTRAVENOUS at 14:58

## 2020-03-17 RX ADMIN — METOPROLOL TARTRATE 50 MG: 50 TABLET, FILM COATED ORAL at 20:38

## 2020-03-17 RX ADMIN — Medication 5 MG/HR: at 16:19

## 2020-03-17 RX ADMIN — POTASSIUM CHLORIDE 20 MEQ: 10 CAPSULE, COATED, EXTENDED RELEASE ORAL at 08:22

## 2020-03-17 RX ADMIN — APIXABAN 5 MG: 5 TABLET, FILM COATED ORAL at 13:41

## 2020-03-17 RX ADMIN — METOPROLOL TARTRATE 25 MG: 25 TABLET, FILM COATED ORAL at 08:22

## 2020-03-17 RX ADMIN — METOPROLOL TARTRATE 25 MG: 25 TABLET, FILM COATED ORAL at 14:07

## 2020-03-17 NOTE — PLAN OF CARE
VSS, room air, remains in A. Fib, IV digoxin and increased beta blocker today, IV Cardizem started at low dose with close monitoring of BP. Denies CP or shortness of air, denies pain.

## 2020-03-17 NOTE — PROGRESS NOTES
Patient is on Apixaban.  Education provided to patient and daughter on 3/17 verbally and in writing.  Discussed effects of medication,  drug-drug and drug-food interactions, and signs/symptoms of bleeding and clotting.  Patient verbalized understanding through teach back.  All pertinent questions were answered.    Alivia Cramer, PharmD  Pharmacy Resident  3/17/2020  11:15

## 2020-03-17 NOTE — PROGRESS NOTES
"[facility]  INPATIENT PROGRESS NOTE         [facility] 4G    3/17/2020      PATIENT IDENTIFICATION:   Name:  Amanda Glover      MRN:  [id number]     87 y.o.  female             Reason for visit: A. fib with RVR, bilateral popliteal DVT      SUBJECTIVE:   Faster heart rates this morning, slight palpitations, denies shortness of breath, on room air mid 90s O2 saturation, lower extreme edema still present     OBJECTIVE:  Vitals:    03/17/20 0445 03/17/20 0500 03/17/20 0740 03/17/20 1148   BP: 119/66  135/90    BP Location: Left arm  Right arm    Patient Position: Lying  Sitting    Pulse: 109 97 98    Resp: 18  18    Temp: 98.3 °F (36.8 °C)  98 °F (36.7 °C)    TempSrc: Oral  Oral    SpO2: 93% 91% 96%    Weight:    68.2 kg (150 lb 5.7 oz)   Height:    160 cm (62.99\")           Body mass index is 26.64 kg/m².  No intake or output data in the 24 hours ending 03/17/20 1350    Telemetry: Personally reviewed, OLIVE morrow rates 110-140    Exam:  General Appearance:   · well developed  · well nourished  Neck:  · thyroid not enlarged  · supple  Respiratory:  · no respiratory distress  · normal breath sounds  · no rales  Cardiovascular:  · no jugular venous distention  irregular irregular rhythm  · apical impulse normal  · S1 normal, S2 normal  · no S3, no S4   · no murmur  · no rub, no thrill  · carotid pulses normal; no bruit  · pedal pulses normal  · lower extremity edema: 1+  Skin:   warm, dry      Allergies   Allergen Reactions   • Influenza Vaccine Live Other (See Comments)     Patient unsure     Scheduled meds:           apixaban 10 mg Oral Q12H   Followed by      [START ON 3/24/2020] apixaban 5 mg Oral Q12H   metoprolol tartrate 25 mg Oral Q12H   potassium chloride 20 mEq Oral Daily     IV meds:                          dilTIAZem 5-15 mg/hr Last Rate: Stopped (03/16/20 1333)     Data Review:  Results from last 7 days   Lab Units 03/17/20  0406 03/16/20  1217   SODIUM mmol/L " 141 140   BUN mg/dL 23 24*   CREATININE mg/dL 1.05* 1.17*   GLUCOSE mg/dL 100* 99     Results from last 7 days   Lab Units 03/16/20  1217   WBC 10*3/mm3 10.03   HEMOGLOBIN g/dL 11.0*         Results from last 7 days   Lab Units 03/16/20  1217   ALT (SGPT) U/L 19   AST (SGOT) U/L 18     No results found for: DIGOXIN   Lab Results   Component Value Date    TSH 1.730 03/16/2020           Invalid input(s): LDLCALC    Estimated Creatinine Clearance: 35 mL/min (A) (by C-G formula based on SCr of 1.05 mg/dL (H)).        Imaging (last 24 hr):   I personally reviewed the most recent chest xray and other pertinent imaging studies.  Results for orders placed during the hospital encounter of 03/16/20   XR Chest 1 View    Narrative EXAMINATION: XR CHEST 1 VW-03/16/2020:     INDICATION: Dysrhythmia, triage protocol.     COMPARISON: NONE.     FINDINGS: History indicates cardiac dysrhythmia. The heart shadow is  borderline enlarged. The vasculature is at upper limits of normal. Mild  nonspecific basilar interstitial changes are noted and there may be  trace bibasilar effusion. The lungs otherwise appear clear. No  pneumothorax is seen.       Impression 1.  Borderline cardiomegaly.  2.  Mild nonspecific basilar interstitial changes and perhaps minimal  pleural effusions. No other evidence of active chest disease is seen.     D:  03/16/2020  E:  03/16/2020            This report was finalized on 3/16/2020 10:06 PM by Dr. Oscar Andres MD.            Last ECHO:  Results for orders placed during the hospital encounter of 03/16/20   Adult Transthoracic Echo Complete W/ Cont if Necessary Per Protocol    Narrative · Left ventricular systolic function is normal. Estimated EF variable   secondary to underlying atrial fibrillation appears to be in the range of   56 - 60%  · All left ventricular wall segments contract normally.  · There is moderate calcification of the aortic valve.Mild aortic valve   regurgitation is present. Mild aortic valve  stenosis is present  · Aortic valve mean pressure gradient is 9.0 mmHg. Aortic valve area is   1.4 cm2. Aortic valve dimensionless index is 0.4.  · Mild MAC is present. Mild mitral valve regurgitation is present  · Mild to moderate tricuspid valve regurgitation is present. Estimated   right ventricular systolic pressure from tricuspid regurgitation is normal   (<35 mmHg).            PROBLEM LIST:     New onset a-fib (CMS/HCC)    Acute deep vein thrombosis (DVT) of popliteal vein of both lower extremities (CMS/HCC)      Initial cardiac assessment: 87-year-old female admitted with A. fib and RVR, given IV digoxin, found to have bilateral subacute popliteal DVTs, started on treatment dose Eliquis.    ASSESSMENT/PLAN:  1.  Subacute bilateral popliteal DVTs:  Eliquis dose changed to DVT treatment dose:  She is tolerating the dose well  No specific lower extremity complaints currently.    2.  A. fib with RVR:  Rate still uncontrolled, start p.o. digoxin today and increase metoprolol as blood pressure tolerates.  Continue Eliquis for stroke prevention    3.  Hypotension:  Continue close monitoring  Normal ejection fraction by echo, mild aortic stenosis noted.    4.  Valvular heart disease:  Mild aortic stenosis, mild MR, mild to moderate TR with normal RVSP.  Currently on room air, continue monitoring     Discussed with patient's nurse, kailey Arreola patient's family      Cameron Sigala MD  3/17/2020    13:50

## 2020-03-18 VITALS
BODY MASS INDEX: 26.64 KG/M2 | DIASTOLIC BLOOD PRESSURE: 78 MMHG | SYSTOLIC BLOOD PRESSURE: 146 MMHG | TEMPERATURE: 98.6 F | RESPIRATION RATE: 16 BRPM | HEART RATE: 60 BPM | HEIGHT: 63 IN | OXYGEN SATURATION: 94 % | WEIGHT: 150.35 LBS

## 2020-03-18 PROBLEM — I35.0 AORTIC VALVE STENOSIS, MILD: Status: ACTIVE | Noted: 2020-03-18

## 2020-03-18 PROBLEM — I10 HYPERTENSION: Status: ACTIVE | Noted: 2020-03-18

## 2020-03-18 LAB — DIGOXIN SERPL-MCNC: 1.58 NG/ML (ref 0.6–1.2)

## 2020-03-18 PROCEDURE — 93010 ELECTROCARDIOGRAM REPORT: CPT | Performed by: INTERNAL MEDICINE

## 2020-03-18 PROCEDURE — 97161 PT EVAL LOW COMPLEX 20 MIN: CPT

## 2020-03-18 PROCEDURE — 97165 OT EVAL LOW COMPLEX 30 MIN: CPT

## 2020-03-18 PROCEDURE — 99238 HOSP IP/OBS DSCHRG MGMT 30/<: CPT | Performed by: INTERNAL MEDICINE

## 2020-03-18 PROCEDURE — 93005 ELECTROCARDIOGRAM TRACING: CPT | Performed by: INTERNAL MEDICINE

## 2020-03-18 PROCEDURE — 80162 ASSAY OF DIGOXIN TOTAL: CPT | Performed by: INTERNAL MEDICINE

## 2020-03-18 RX ORDER — DIGOXIN 125 MCG
125 TABLET ORAL
Status: DISCONTINUED | OUTPATIENT
Start: 2020-03-18 | End: 2020-03-18 | Stop reason: HOSPADM

## 2020-03-18 RX ADMIN — SODIUM CHLORIDE, PRESERVATIVE FREE 10 ML: 5 INJECTION INTRAVENOUS at 08:50

## 2020-03-18 RX ADMIN — POTASSIUM CHLORIDE 20 MEQ: 10 CAPSULE, COATED, EXTENDED RELEASE ORAL at 08:50

## 2020-03-18 RX ADMIN — DIGOXIN 125 MCG: 125 TABLET ORAL at 11:59

## 2020-03-18 RX ADMIN — APIXABAN 10 MG: 5 TABLET, FILM COATED ORAL at 08:51

## 2020-03-18 NOTE — THERAPY DISCHARGE NOTE
Patient Name: Amanda Glover  : 10/31/1932    MRN: [id number]                              Today's Date: 3/18/2020     Eval-D/C  Admit Date: 3/16/2020    Visit Dx:     ICD-10-CM ICD-9-CM   1. New onset a-fib (CMS/HCC) I48.91 427.31   2. Atrial fibrillation with rapid ventricular response (CMS/HCC) I48.91 427.31     Patient Active Problem List   Diagnosis   • New onset a-fib (CMS/HCC)   • Acute deep vein thrombosis (DVT) of popliteal vein of both lower extremities (CMS/HCC)   • Hypertension   • Aortic valve stenosis, mild     Past Medical History:   Diagnosis Date   • Dementia (CMS/HCC)    • Hyperlipidemia    • Hypertension    • Murmur    • Osteoporosis      Past Surgical History:   Procedure Laterality Date   • APPENDECTOMY     • FEMUR FRACTURE SURGERY Left 2020   • HERNIA REPAIR     • HYSTERECTOMY       General Information     Row Name 20 1108          PT Evaluation Time/Intention    Document Type  evaluation  -EJ     Mode of Treatment  physical therapy  -EJ     Row Name 20 1108          General Information    Patient Profile Reviewed?  yes  -EJ     Prior Level of Function  independent:;all household mobility;community mobility;ADL's;home management dtrs take her shopping normally. Since fx pt has not been shopping.  -EJ     Existing Precautions/Restrictions  cardiac  -EJ     Row Name 20 1108          Relationship/Environment    Lives With  alone  -EJ     Row Name 20 1108          Resource/Environmental Concerns    Current Living Arrangements  home/apartment/condo  -EJ     Row Name 20 1108          Cognitive Assessment/Intervention- PT/OT    Orientation Status (Cognition)  oriented to;person;verbal cues/prompts needed for orientation;place;time;situation  -EJ     Cognitive Assessment/Intervention Comment  Pt initially answers nursing home, october as current month. Pt able to correct with minimal cueing.   -EJ     Row Name 20 1108          Safety Issues, Functional Mobility     Impairments Affecting Function (Mobility)  cognition  -EJ     Comment, Safety Issues/Impairments (Mobility)  PT discussed pt with OT, OT to further assess cognition r/t ADLs and family support.  -EJ       User Key  (r) = Recorded By, (t) = Taken By, (c) = Cosigned By    Initials Name Provider Type    Migdalia Sotelo PT Physical Therapist        Mobility     Row Name 03/18/20 1115          Bed Mobility Assessment/Treatment    Bed Mobility Assessment/Treatment  bed mobility (all) activities  -EJ     [place] Level (Bed Mobility)  conditional independence  -EJ     Assistive Device (Bed Mobility)  head of bed elevated  -EJ     Row Name 03/18/20 1115          Sit-Stand Transfer    Sit-Stand [place] (Transfers)  conditional independence  -EJ     Assistive Device (Sit-Stand Transfers)  walker, front-wheeled  -EJ     Row Name 03/18/20 1115          Gait/Stairs Assessment/Training    [place] Level (Gait)  conditional independence  -EJ     Assistive Device (Gait)  walker, front-wheeled  -EJ     Distance in Feet (Gait)  350  -EJ     Pattern (Gait)  step-through  -EJ     Comment (Gait/Stairs)  Pt ambulates with step through gait pattern, upright posture, fluid movement, heel strike. No LOB noted. VSS.  -EJ       User Key  (r) = Recorded By, (t) = Taken By, (c) = Cosigned By    Initials Name Provider Type    Migdalia Sotelo PT Physical Therapist        Obj/Interventions     Row Name 03/18/20 1117          General ROM    GENERAL ROM COMMENTS  WNL BLEs.  -EJ     Row Name 03/18/20 1117          MMT (Manual Muscle Testing)    General MMT Comments  LLE hip flexion  4+/5; Remainder 5/5 BLEs.   -EJ     Row Name 03/18/20 1117          Static Sitting Balance    Level of [place] (Unsupported Sitting, Static Balance)  independent  -EJ     Row Name 03/18/20 1117          Dynamic Sitting Balance    Level of [place], Reaches Outside Midline (Sitting, Dynamic Balance)  independent  -EJ     Row Name  03/18/20 1117          Static Standing Balance    Level of [place] (Supported Standing, Static Balance)  conditional independence  -EJ     Assistive Device Utilized (Supported Standing, Static Balance)  walker, rolling  -EJ     Row Name 03/18/20 1117          Dynamic Standing Balance    Level of [place], Reaches Outside Midline (Standing, Dynamic Balance)  conditional independence  -EJ     Assistive Device Utilized (Supported Standing, Dynamic Balance)  walker, rolling  -EJ     Row Name 03/18/20 1117          Sensory Assessment/Intervention    Sensory General Assessment  no sensation deficits identified  -EJ       User Key  (r) = Recorded By, (t) = Taken By, (c) = Cosigned By    Initials Name Provider Type    EJ Migdalia Alvarado, PT Physical Therapist        Goals/Plan     Row Name 03/18/20 1123          Bed Mobility Goal 1 (PT)    Activity/Assistive Device (Bed Mobility Goal 1, PT)  bed mobility activities, all  -EJ     [place] Level/Cues Needed (Bed Mobility Goal 1, PT)  conditional independence  -EJ     Progress/Outcomes (Bed Mobility Goal 1, PT)  goal met  -EJ     Row Name 03/18/20 1123          Transfer Goal 1 (PT)    Activity/Assistive Device (Transfer Goal 1, PT)  sit-to-stand/stand-to-sit  -EJ     [place] Level/Cues Needed (Transfer Goal 1, PT)  conditional independence  -EJ     Progress/Outcome (Transfer Goal 1, PT)  goal met  -EJ     Row Name 03/18/20 1123          Gait Training Goal 1 (PT)    Activity/Assistive Device (Gait Training Goal 1, PT)  gait (walking locomotion)  -EJ     [place] Level (Gait Training Goal 1, PT)  conditional independence  -EJ     Progress/Outcome (Gait Training Goal 1, PT)  goal met  -EJ       User Key  (r) = Recorded By, (t) = Taken By, (c) = Cosigned By    Initials Name Provider Type    EJ Migdalia Alvarado, PT Physical Therapist        Clinical Impression     Row Name 03/18/20 1119          Pain Assessment    Additional Documentation  Pain Scale:  Numbers Pre/Post-Treatment (Group)  -EJ     Row Name 03/18/20 1119          Pain Scale: Numbers Pre/Post-Treatment    Pain Scale: Numbers, Pretreatment  0/10 - no pain  -EJ     Pain Scale: Numbers, Post-Treatment  0/10 - no pain  -EJ     Pain Intervention(s)  Repositioned;Ambulation/increased activity  -EJ     Row Name 03/18/20 1119          Plan of Care Review    Plan of Care Reviewed With  patient;daughter  -EJ     Outcome Summary  Pt ambulates in [place] x 350 ft with conditional independence using RWx. Pt demonstrates good strength with slight weakness in LLE hip flexors. Pt otherwise with adequate mobility to return home in ILF. OT to further assess cognition. HHPT recommended to continue upon d/c.  -EJ     Row Name 03/18/20 1119          Physical Therapy Clinical Impression    Patient/Family Goals Statement (PT Clinical Impression)  go home.  -EJ     Criteria for Skilled Interventions Met (PT Clinical Impression)  no  -EJ     Row Name 03/18/20 1119          Vital Signs    Pre Systolic BP Rehab  129  -EJ     Pre Treatment Diastolic BP  73  -EJ     Post Systolic BP Rehab  146  -EJ     Post Treatment Diastolic BP  78  -EJ     Pretreatment Heart Rate (beats/min)  59  -EJ     Intratreatment Heart Rate (beats/min)  79  -EJ     Posttreatment Heart Rate (beats/min)  73  -EJ     Pre SpO2 (%)  95  -EJ     O2 Delivery Pre Treatment  room air  -EJ     Intra SpO2 (%)  94  -EJ     O2 Delivery Intra Treatment  room air  -EJ     Post SpO2 (%)  95  -EJ     O2 Delivery Post Treatment  room air  -EJ     Row Name 03/18/20 1119          Positioning and Restraints    Pre-Treatment Position  in bed  -EJ     Post Treatment Position  bed  -EJ     In Bed  supine;call light within reach;encouraged to call for assist;with family/caregiver;notified nsg  -EJ       User Key  (r) = Recorded By, (t) = Taken By, (c) = Cosigned By    Initials Name Provider Type    EJ Migdalia Alvarado PT Physical Therapist        Outcome Measures     Row Name  03/18/20 1124          How much help from another person do you currently need...    Turning from your back to your side while in flat bed without using bedrails?  4  -EJ     Moving from lying on back to sitting on the side of a flat bed without bedrails?  4  -EJ     Moving to and from a bed to a chair (including a wheelchair)?  4  -EJ     Standing up from a chair using your arms (e.g., wheelchair, bedside chair)?  4  -EJ     Climbing 3-5 steps with a railing?  3  -EJ     To walk in hospital room?  4  -EJ     AM-PAC 6 Clicks Score (PT)  23  -     Row Name 03/18/20 1124          Functional Assessment    Outcome Measure Options  AM-PAC 6 Clicks Basic Mobility (PT)  -       User Key  (r) = Recorded By, (t) = Taken By, (c) = Cosigned By    Initials Name Provider Type    Migdalia Sotelo PT Physical Therapist        Physical Therapy Education                 Title: PT OT SLP Therapies (Done)     Topic: Physical Therapy (Done)     Point: Mobility training (Done)     Description:   Instruct learner(s) on safety and technique for assisting patient out of bed, chair or wheelchair.  Instruct in the proper use of assistive devices, such as walker, crutches, cane or brace.              Patient Friendly Description:   It's important to get you on your feet again, but we need to do so in a way that is safe for you. Falling has serious consequences, and your personal safety is the most important thing of all.        When it's time to get out of bed, one of us or a family member will sit next to you on the bed to give you support.     If your doctor or nurse tells you to use a walker, crutches, a cane, or a brace, be sure you use it every time you get out of bed, even if you think you don't need it.    Learning Progress Summary           Patient Acceptance, E, VU,NR by JESSICA at 3/18/2020 1125                               User Key     Initials Effective Dates Name Provider Type [facility] 11/20/18 -  Migdalia Alvarado  TIFFANIE PT Physical Therapist PT              PT Recommendation and Plan     Outcome Summary/Treatment Plan (PT)  Anticipated Discharge Disposition (PT): home with home health(ILF.)  Plan of Care Reviewed With: patient, daughter  Outcome Summary: Pt ambulates in [place] x 350 ft with conditional independence using RWx. Pt demonstrates good strength with slight weakness in LLE hip flexors. Pt otherwise with adequate mobility to return home in ILF. OT to further assess cognition. HHPT recommended to continue upon d/c.     Time Calculation:   PT Charges     Row Name 03/18/20 1125             Time Calculation    Start Time  1045  -EJ      PT Received On  03/18/20  -EJ         Time Calculation- PT    Total Timed Code Minutes- PT  0 minute(s)  -EJ        User Key  (r) = Recorded By, (t) = Taken By, (c) = Cosigned By    Initials Name Provider Type    Migdalia Sotelo PT Physical Therapist        Therapy Charges for Today     Code Description Service Date Service Provider Modifiers Qty    [id number] HC PT EVAL LOW COMPLEXITY 4 3/18/2020 Migdalia Alvarado PT GP 1          PT G-Codes  Outcome Measure Options: AM-PAC 6 Clicks Basic Mobility (PT)  AM-PAC 6 Clicks Score (PT): 23    PT Discharge Summary  Anticipated Discharge Disposition (PT): home with home health(ILF.)    Migdalia Temple, PT  3/18/2020        - - -

## 2020-03-18 NOTE — THERAPY DISCHARGE NOTE
Acute Care - Occupational Therapy Initial Eval/Discharge  [facility]     Patient Name: Amanda Glover  : 10/31/1932  MRN: [id number]  Today's Date: 3/18/2020     Date of Referral to OT: 20         Admit Date: 3/16/2020       ICD-10-CM ICD-9-CM   1. New onset a-fib (CMS/HCC) I48.91 427.31   2. Atrial fibrillation with rapid ventricular response (CMS/HCC) I48.91 427.31     Patient Active Problem List   Diagnosis   • New onset a-fib (CMS/HCC)   • Acute deep vein thrombosis (DVT) of popliteal vein of both lower extremities (CMS/HCC)   • Hypertension   • Aortic valve stenosis, mild     Past Medical History:   Diagnosis Date   • Dementia (CMS/HCC)    • Hyperlipidemia    • Hypertension    • Murmur    • Osteoporosis      Past Surgical History:   Procedure Laterality Date   • APPENDECTOMY     • FEMUR FRACTURE SURGERY Left 2020   • HERNIA REPAIR     • HYSTERECTOMY            OT ASSESSMENT FLOWSHEET (last 12 hours)      Occupational Therapy Evaluation     Row Name 20 1110                   OT Evaluation Time/Intention    Subjective Information  no complaints  -KF        Document Type  discharge evaluation/summary  -KF        Mode of Treatment  occupational therapy  -KF        Patient Effort  excellent  -KF        Symptoms Noted During/After Treatment  none  -KF           General Information    Patient Profile Reviewed?  yes  -KF        Prior Level of Function  independent:;all household mobility;transfer;bed mobility;ADL's;mod assist:;home management dtrs assist with med management and meal management   -KF        Equipment Currently Used at Home  walker, rolling  -KF        Existing Precautions/Restrictions  cardiac  -KF        Equipment Issued to Patient  long handled sponge  -KF        Risks Reviewed  patient and family:;LOB;nausea/vomiting;increased discomfort;dizziness;change in vital signs  -KF        Benefits Reviewed  patient and family:;improve function;increase independence;decrease pain;increase  balance;increase strength  -KF        Barriers to Rehab  none identified  -KF           Relationship/Environment    Primary Source of Support/Comfort  child(emily)  -KF        Lives With  alone  -KF        Family Caregiver if Needed  child(emily), adult  -KF           Resource/Environmental Concerns    Current Living Arrangements  independent/assisted living facility  -KF           Cognitive Assessment/Interventions    Additional Documentation  Cognitive Assessment/Intervention (Group)  -KF           Cognitive Assessment/Intervention- PT/OT    Affect/Mental Status (Cognitive)  WFL;confused  -KF        Orientation Status (Cognition)  oriented to;person;disoriented to;place;situation  -KF        Follows Commands (Cognition)  follows one step commands;over 90% accuracy;repetition of directions required;verbal cues/prompting required  -KF        Cognitive Function (Cognitive)  memory deficit;safety deficit  -KF        Memory Deficit (Cognitive)  moderate deficit;short term memory  -KF        Safety Deficit (Cognitive)  mild deficit;judgment;safety precautions awareness  -KF        Cognitive Interventions (Cognitive)  occupation/activity based interventions;process/task specific training  -KF        Cognitive Assessment/Intervention Comment  cues for orientation for place and time  -KF           Safety Issues, Functional Mobility    Safety Issues Affecting Function (Mobility)  insight into deficits/self awareness;judgment;problem solving  -KF        Impairments Affecting Function (Mobility)  cognition  -KF        Comment, Safety Issues/Impairments (Mobility)  family assists with meals and medication management at baseline, checks on Pt 2 daily   -KF           Bed Mobility Assessment/Treatment    Bed Mobility Assessment/Treatment  rolling left;scooting/bridging;supine-sit  -KF        [place] Level (Bed Mobility)  independent  -KF        Rolling Left [place] (Bed Mobility)  independent  -KF        Scooting/Bridging  [place] (Bed Mobility)  independent  -KF        Supine-Sit [place] (Bed Mobility)  independent  -KF        Bed Mobility, Safety Issues  cognitive deficits limit understanding  -KF        Comment (Bed Mobility)  good completion and safety   -KF           Functional Mobility    Functional Mobility- Ind. Level  standby assist  -KF        Functional Mobility- Device  rolling walker  -KF        Functional Mobility-Distance (Feet)  6  -KF        Functional Mobility- Safety Issues  weight-shifting ability decreased;step length decreased  -KF           Transfer Assessment/Treatment    Transfer Assessment/Treatment  sit-stand transfer;stand-sit transfer;bed-chair transfer  -KF        Comment (Transfers)  min vc's for HP good safety but slightly impulsive   -KF           Bed-Chair Transfer    Bed-Chair [place] (Transfers)  stand by assist  -KF        Assistive Device (Bed-Chair Transfers)  walker, front-wheeled  -KF           Sit-Stand Transfer    Sit-Stand [place] (Transfers)  supervision  -KF        Assistive Device (Sit-Stand Transfers)  walker, front-wheeled  -KF           Stand-Sit Transfer    Stand-Sit [place] (Transfers)  conditional independence  -KF        Assistive Device (Stand-Sit Transfers)  walker, front-wheeled  -KF           ADL Assessment/Intervention    BADL Assessment/Intervention  lower body dressing;grooming;feeding  -KF           Lower Body Dressing Assessment/Training    Lower Body Dressing [place] Level  don;doff;socks;independent  -KF        Lower Body Dressing Position  edge of bed sitting  -KF           Grooming Assessment/Training    [place] Level (Grooming)  wash face, hands;set up  -KF        Grooming Position  supported sitting  -KF           Self-Feeding Assessment/Training    [place] Level (Feeding)  prepare tray/open items;scoop food and bring to mouth;set up  -KF        Self-Feeding Assess/Train, Spillage Amount  minimal  -KF        Position  (Self-Feeding)  supported sitting  -KF           BADL Safety/Performance    Impairments, BADL Safety/Performance  cognition  -KF        Cognitive Impairments, BADL Safety/Performance  awareness, need for assistance;impulsivity;safety precaution awareness  -KF        Skilled BADL Treatment/Intervention  BADL process/adaptation training;cognitive/safety deficit modifications  -KF        Progress in BADL Status  improvement noted  -KF           General ROM    GENERAL ROM COMMENTS  BUE WNL   -KF           MMT (Manual Muscle Testing)    General MMT Comments  BUE grossly 4+/5   -KF           Motor Assessment/Interventions    Additional Documentation  Balance (Group);Balance Interventions (Group);Fine Motor Testing & Training (Group);Gross Motor Coordination (Group)  -KF           Gross Motor Coordination    Gross Motor Impairments  AROM (active range of motion);coordination;finger to nose  -KF        Gross Motor Skill, Impairments Detail  WNL   -KF           Balance    Balance  static sitting balance;static standing balance;dynamic sitting balance;dynamic standing balance  -KF           Static Sitting Balance    Level of [place] (Unsupported Sitting, Static Balance)  independent  -KF        Sitting Position (Unsupported Sitting, Static Balance)  sitting in chair;sitting on edge of bed  -KF           Dynamic Sitting Balance    Level of [place], Reaches Outside Midline (Sitting, Dynamic Balance)  independent  -KF        Sitting Position, Reaches Outside Midline (Sitting, Dynamic Balance)  sitting in chair;sitting on edge of bed  -KF           Static Standing Balance    Level of [place] (Supported Standing, Static Balance)  conditional independence  -KF        Time Able to Maintain Position (Supported Standing, Static Balance)  1 to 2 minutes  -KF        Assistive Device Utilized (Supported Standing, Static Balance)  walker, rolling  -KF           Dynamic Standing Balance    Level of [place], Reaches  Outside Midline (Standing, Dynamic Balance)  supervision;standby assist  -KF        Time Able to Maintain Position, Reaches Outside Midline (Standing, Dynamic Balance)  1 to 2 minutes  -KF        Assistive Device Utilized (Supported Standing, Dynamic Balance)  walker, rolling  -KF           Fine Motor Testing & Training    Training Activity, Fine Motor Coordination  manipulation of eating utensils  -KF        Comment, Fine Motor Coordination  WFL   -KF           Sensory Assessment/Intervention    Sensory General Assessment  no sensation deficits identified  -KF           Positioning and Restraints    Pre-Treatment Position  in bed  -KF        Post Treatment Position  chair  -KF        In Chair  notified nsg;reclined;sitting;encouraged to call for assist;call light within reach;with family/caregiver;legs elevated  -KF           Pain Assessment    Additional Documentation  Pain Scale: Numbers Pre/Post-Treatment (Group)  -KF           Pain Scale: Numbers Pre/Post-Treatment    Pain Scale: Numbers, Pretreatment  0/10 - no pain  -KF        Pain Scale: Numbers, Post-Treatment  0/10 - no pain  -KF        Pain Intervention(s)  Repositioned;Ambulation/increased activity  -KF           Plan of Care Review    Plan of Care Reviewed With  patient;daughter  -KF        Progress  improving  -KF           Clinical Impression (OT)    Date of Referral to OT  03/18/20  -KF        OT Diagnosis  ADL decline   -KF        Patient/Family Goals Statement (OT Eval)  PLOF   -KF        Criteria for Skilled Therapeutic Interventions Met (OT Eval)  no;treatment indicated  -KF        Rehab Potential (OT Eval)  good, to achieve stated therapy goals  -KF        Therapy Frequency (OT Eval)  evaluation only  -KF        Care Plan Review (OT)  evaluation/treatment results reviewed;care plan/treatment goals reviewed;risks/benefits reviewed;current/potential barriers reviewed;patient/other agree to care plan  -KF        Care Plan Review, Other Participant  (OT Eval)  family  -KF        Anticipated Equipment Needs at Discharge (OT)  -- none  -KF        Anticipated Discharge Disposition (OT)  home with assist  -KF           Vital Signs    Pre Systolic BP Rehab  -- RN cleared VSS   -KF        Posttreatment Heart Rate (beats/min)  64  -KF        Pre SpO2 (%)  94  -KF        O2 Delivery Pre Treatment  room air  -KF        Post SpO2 (%)  94  -KF        O2 Delivery Post Treatment  room air  -KF        Pre Patient Position  Supine  -KF        Intra Patient Position  Standing  -KF        Post Patient Position  Sitting  -KF           OT Goals    Bed Mobility Goal Selection (OT)  bed mobility, OT goal 1  -KF        Transfer Goal Selection (OT)  transfer, OT goal 1  -KF        Dressing Goal Selection (OT)  dressing, OT goal 1  -KF           Bed Mobility Goal 1 (OT)    Activity/Assistive Device (Bed Mobility Goal 1, OT)  supine to sit  -KF        [place] Level/Cues Needed (Bed Mobility Goal 1, OT)  independent  -KF        Time Frame (Bed Mobility Goal 1, OT)  long term goal (LTG);by discharge  -KF        Progress/Outcomes (Bed Mobility Goal 1, OT)  goal met  -KF           Transfer Goal 1 (OT)    Activity/Assistive Device (Transfer Goal 1, OT)  bed-to-chair/chair-to-bed;walker, rolling  -KF        [place] Level/Cues Needed (Transfer Goal 1, OT)  supervision required  -KF        Time Frame (Transfer Goal 1, OT)  long term goal (LTG);by discharge  -KF        Progress/Outcome (Transfer Goal 1, OT)  goal met  -KF           Dressing Goal 1 (OT)    Activity/Assistive Device (Dressing Goal 1, OT)  lower body dressing socks   -KF        [place]/Cues Needed (Dressing Goal 1, OT)  set-up required  -KF        Time Frame (Dressing Goal 1, OT)  long term goal (LTG);by discharge  -KF        Progress/Outcome (Dressing Goal 1, OT)  goal met  -KF           Discharge Summary (Occupational Therapy)    Additional Documentation  Discharge Summary, OT Eval (Group)  -KF           Discharge  Summary, OT Eval    Reason for Discharge (OT Discharge Summary)  no further expectation of functional progress;no further needs identified  -KF        Outcomes Achieved Upon Discharge (OT Discharge Summary)  discharge from facility occurred on same date as evaluation  -KF           Living Environment    Home Accessibility  wheelchair accessible;other (see comments) sponge bathes at baseline   -KF          User Key  (r) = Recorded By, (t) = Taken By, (c) = Cosigned By    Initials Name Effective Dates    KF Anjelica Rodriguez, OT 04/03/18 -           Occupational Therapy Education                 Title: PT OT SLP Therapies (Done)     Topic: Occupational Therapy (Done)     Point: ADL training (Done)     Description:   Instruct learner(s) on proper safety adaptation and remediation techniques during self care or transfers.   Instruct in proper use of assistive devices.              Learning Progress Summary           Patient Acceptance, E,TB,D, VU,DU by KF at 3/18/2020 1114   Family Acceptance, E,TB,D, VU,DU by KF at 3/18/2020 1114                   Point: Home exercise program (Done)     Description:   Instruct learner(s) on appropriate technique for monitoring, assisting and/or progressing therapeutic exercises/activities.              Learning Progress Summary           Patient Acceptance, E,TB,D, VU,DU by KF at 3/18/2020 1114   Family Acceptance, E,TB,D, VU,DU by KF at 3/18/2020 1114                   Point: Precautions (Done)     Description:   Instruct learner(s) on prescribed precautions during self-care and functional transfers.              Learning Progress Summary           Patient Acceptance, E,TB,D, VU,DU by KF at 3/18/2020 1114   Family Acceptance, E,TB,D, VU,DU by KF at 3/18/2020 1114                   Point: Body mechanics (Done)     Description:   Instruct learner(s) on proper positioning and spine alignment during self-care, functional mobility activities and/or exercises.              Learning  Progress Summary           Patient Acceptance, E,TB,D, VU,DU by KF at 3/18/2020 1114   Family Acceptance, E,TB,D, VU,DU by  at 3/18/2020 1114                               User Key     Initials Effective Dates Name Provider Type Discipline     04/03/18 -  Anjelica Rodriguez OT Occupational Therapist OT                OT Recommendation and Plan  Outcome Summary/Treatment Plan (OT)  Anticipated Equipment Needs at Discharge (OT): (none)  Anticipated Discharge Disposition (OT): home with assist  Reason for Discharge (OT Discharge Summary): no further expectation of functional progress, no further needs identified  Therapy Frequency (OT Eval): evaluation only  Plan of Care Review  Plan of Care Reviewed With: patient, daughter  Plan of Care Reviewed With: patient, daughter  Outcome Summary: OT eval completed Pt completed STS with SUP at [facility] no LOB, SBA functional transfer bed to chair no LOB at [facility], I donning/doffing socks, set up self-feeding, dtrs assist with home management and medications, recom home with assist at d/c     Rehab Goal Summary     Row Name 03/18/20 1123 03/18/20 1110          Bed Mobility Goal 1 (PT)    Activity/Assistive Device (Bed Mobility Goal 1, PT)  bed mobility activities, all  -EJ  --     [place] Level/Cues Needed (Bed Mobility Goal 1, PT)  conditional independence  -EJ  --     Progress/Outcomes (Bed Mobility Goal 1, PT)  goal met  -EJ  --        Transfer Goal 1 (PT)    Activity/Assistive Device (Transfer Goal 1, PT)  sit-to-stand/stand-to-sit  -EJ  --     [place] Level/Cues Needed (Transfer Goal 1, PT)  conditional independence  -EJ  --     Progress/Outcome (Transfer Goal 1, PT)  goal met  -EJ  --        Gait Training Goal 1 (PT)    Activity/Assistive Device (Gait Training Goal 1, PT)  gait (walking locomotion)  -EJ  --     [place] Level (Gait Training Goal 1, PT)  conditional independence  -EJ  --     Progress/Outcome (Gait Training Goal 1, PT)  goal met  -EJ  --         Occupational Therapy Goals    Bed Mobility Goal Selection (OT)  --  bed mobility, OT goal 1  -KF     Transfer Goal Selection (OT)  --  transfer, OT goal 1  -KF     Dressing Goal Selection (OT)  --  dressing, OT goal 1  -KF        Bed Mobility Goal 1 (OT)    Activity/Assistive Device (Bed Mobility Goal 1, OT)  --  supine to sit  -KF     [place] Level/Cues Needed (Bed Mobility Goal 1, OT)  --  independent  -KF     Time Frame (Bed Mobility Goal 1, OT)  --  long term goal (LTG);by discharge  -KF     Progress/Outcomes (Bed Mobility Goal 1, OT)  --  goal met  -KF        Transfer Goal 1 (OT)    Activity/Assistive Device (Transfer Goal 1, OT)  --  bed-to-chair/chair-to-bed;walker, rolling  -KF     [place] Level/Cues Needed (Transfer Goal 1, OT)  --  supervision required  -KF     Time Frame (Transfer Goal 1, OT)  --  long term goal (LTG);by discharge  -KF     Progress/Outcome (Transfer Goal 1, OT)  --  goal met  -KF        Dressing Goal 1 (OT)    Activity/Assistive Device (Dressing Goal 1, OT)  --  lower body dressing socks   -KF     [place]/Cues Needed (Dressing Goal 1, OT)  --  set-up required  -KF     Time Frame (Dressing Goal 1, OT)  --  long term goal (LTG);by discharge  -KF     Progress/Outcome (Dressing Goal 1, OT)  --  goal met  -KF       User Key  (r) = Recorded By, (t) = Taken By, (c) = Cosigned By    Initials Name Provider Type Discipline    EJ Migdalia Alvarado, PT Physical Therapist PT    Anjelica Gilmore, OT Occupational Therapist OT          Outcome Measures     Row Name 03/18/20 1110             How much help from another is currently needed...    Putting on and taking off regular lower body clothing?  4  -KF      Bathing (including washing, rinsing, and drying)  4  -KF      Toileting (which includes using toilet bed pan or urinal)  4  -KF      Putting on and taking off regular upper body clothing  4  -KF      Taking care of personal grooming (such as brushing teeth)  4  -KF      Eating  meals  3  -KF      AM-PAC 6 Clicks Score (OT)  23  -KF         Functional Assessment    Outcome Measure Options  AM-PAC 6 Clicks Daily Activity (OT)  -KF        User Key  (r) = Recorded By, (t) = Taken By, (c) = Cosigned By    Initials Name Provider Type    Anjelica Gilmore OT Occupational Therapist          Time Calculation:   Time Calculation- OT     Row Name 03/18/20 1110             Time Calculation- OT    OT Start Time  1110  -KF      Total Timed Code Minutes- OT  0 minute(s)  -KF      OT Received On  03/18/20  -KF        User Key  (r) = Recorded By, (t) = Taken By, (c) = Cosigned By    Initials Name Provider Type    Anjelica Gilmore OT Occupational Therapist        Therapy Suggested Charges     Code   Minutes Charges    None           Therapy Charges for Today     Code Description Service Date Service Provider Modifiers Qty    [id number] HC OT EVAL LOW COMPLEXITY 4 3/18/2020 Anjelica Rodriguez OT GO 1               OT Discharge Summary  Anticipated Discharge Disposition (OT): home with assist  Reason for Discharge: At baseline function  Outcomes Achieved: Refer to plan of care for updates on goals achieved  Discharge Destination: Home with assist    Anjelica Rodriguez OT  3/18/2020

## 2020-03-18 NOTE — PROGRESS NOTES
"[facility]  IP Progress Note   LOS: 2 days   Patient Care Team:  Denis Lr MD as PCP - General (Family Medicine)    Chief Complaint: Follow up for Atrial Fibrilation  Valvular Heart Disease    Subjective Denies Chest Pain Denies Dyspnea Eating OK Ambulating             Active Hospital Problems    Diagnosis    • **New onset a-fib (CMS/HCC) [I48.91]         • Acute deep vein thrombosis (DVT) of popliteal vein of both lower extremities (CMS/HCC) [I82.433]           3/17/2020: Started on Eliquis.     • Hypertension [I10]         • Aortic valve stenosis, mild [I35.0]           1 echo 3-16-20:   · There is moderate calcification of the aortic valve.Mild aortic valve regurgitation is present. Mild aortic valve stenosis is present  · Aortic valve mean pressure gradient is 9.0 mmHg. Aortic valve area is 1.4 cm2. Aortic valve dimensionless index is 0.4.                 Tele: Sinus Rythym    Vitals:  /73   Pulse 55   Temp 98.6 °F (37 °C) (Oral)   Resp 16   Ht 160 cm (62.99\")   Wt 68.2 kg (150 lb 5.7 oz)   SpO2 95%   BMI 26.64 kg/m²    Body mass index is 26.64 kg/m².  No intake or output data in the 24 hours ending 03/18/20 1043    Physical Exam:      General: alert, no acute distress, acyanotic, well developed, well nourished   Chest: Clear Auscultation and No Wheezes   CV: Heart sounds are normal.  Regular rate and rhythm without murmur, gallop or rub.   Extremities: negative    Results Review:     I reviewed the patient's new clinical results.    Results from last 7 days   Lab Units 03/16/20  1217   WBC 10*3/mm3 10.03   HEMOGLOBIN g/dL 11.0*   HEMATOCRIT % 35.8   PLATELETS 10*3/mm3 355     Results from last 7 days   Lab Units 03/17/20  0406 03/16/20  1217   SODIUM mmol/L 141 140   POTASSIUM mmol/L 4.6 4.7   CHLORIDE mmol/L 103 102   CO2 mmol/L 27.0 28.0   BUN mg/dL 23 24*   CREATININE mg/dL 1.05* 1.17*   GLUCOSE mg/dL 100* 99   CALCIUM mg/dL 9.3 9.3   ALT (SGPT) U/L  " --  19   AST (SGOT) U/L  --  18     Estimated Creatinine Clearance: 35 mL/min (A) (by C-G formula based on SCr of 1.05 mg/dL (H)).  No results found for: HGBA1C  Results from last 7 days   Lab Units 03/16/20  1217   TROPONIN T ng/mL <0.010     Results from last 7 days   Lab Units 03/16/20  1556 03/16/20  1217   PROBNP pg/mL 5,426.0* 4,859.0*       Scheduled Meds:  apixaban 10 mg Oral Q12H   Followed by      [START ON 3/24/2020] apixaban 5 mg Oral Q12H   digoxin 125 mcg Oral Daily   metoprolol tartrate 25 mg Oral Q12H   potassium chloride 20 mEq Oral Daily       Continuous Infusions:  dilTIAZem off Last Rate:  (03/17/20 2078)              Assessment: Plan:  ASSESSMENT/PLAN:  1.  Subacute bilateral popliteal DVTs:   -Eliquis at DVT treatment dose:    2.  A. fib with RVR:   -Now in SR on p.o. digoxin and  metoprolol.   -Continue Eliquis for stroke prevention     3.  Hypotension:   -resolved   -Normal ejection fraction by echo, mild aortic stenosis noted.     4.  Valvular heart disease:   -Mild aortic stenosis, mild MR, mild to moderate TR with normal RVSP.        Condition stable  DC to home on current meds  Follow up with Dr Chand in 4 weeks  Follow up with PCP 1-2 weeks        Electronically signed by BOSSMAN Panchal, 03/18/20, 10:54 AM.  I agree with the above assessment and plan

## 2020-03-18 NOTE — PLAN OF CARE
Problem: Patient Care Overview  Goal: Plan of Care Review  Outcome: Ongoing (interventions implemented as appropriate)  Flowsheets  Taken 3/18/2020 0345  Progress: no change  Outcome Summary: no complaints from pt, HR in 110's-130's, cardizem drip started around 2230, around 0130 pt HR went bradycardic, cardizem drip stopped and HR slowly coming back up. Pt asymtomatic, no complaints, will continue to monitor  Taken 3/17/2020 2000  Plan of Care Reviewed With: patient;daughter

## 2020-03-18 NOTE — PROGRESS NOTES
Continued Stay Note  [facility]     Patient Name: Amanda Glover  MRN: [id number]  Today's Date: 3/18/2020    Admit Date: 3/16/2020    Discharge Plan     Row Name 03/18/20 0859       Plan    Plan  Home    Patient/Family in Agreement with Plan  yes    Plan Comments  Spoke with pt. and dtr. at bedside. Pt. lives in an independent living apt. at [facility]. States she is ready to return there. She curently has home PT through [facility].     Final Discharge Disposition Code  01 - home or self-care        Discharge Codes    No documentation.             Sejal Benitez RN

## 2020-03-18 NOTE — PLAN OF CARE
Problem: Patient Care Overview  Goal: Plan of Care Review  Flowsheets  Taken 3/18/2020 0345 by Sagar Oakley, RN  Progress: no change  Taken 3/18/2020 1119 by Migdalia Alvarado PT  Plan of Care Reviewed With: patient;daughter  Outcome Summary: Pt ambulates in [place] x 350 ft with conditional independence using RWx. Pt demonstrates good strength with slight weakness in LLE hip flexors. Pt otherwise with adequate mobility to return home in ILF. OT to further assess cognition. HHPT recommended to continue upon d/c.

## 2020-03-18 NOTE — PLAN OF CARE
Problem: Patient Care Overview  Goal: Plan of Care Review  Flowsheets (Taken 3/18/2020 1110)  Outcome Summary: OT eval completed Pt completed STS with SUP at [facility] no LOB, SBA functional transfer bed to chair no LOB at [facility], I donning/doffing socks, set up self-feeding, dtrs assist with home management and medications, recom home with assist at d/c

## 2020-03-18 NOTE — THERAPY EVALUATION
Patient Name: Amanda Glover  : 10/31/1932    MRN: [id number]                              Today's Date: 3/18/2020       Admit Date: 3/16/2020    Visit Dx:     ICD-10-CM ICD-9-CM   1. New onset a-fib (CMS/HCC) I48.91 427.31   2. Atrial fibrillation with rapid ventricular response (CMS/HCC) I48.91 427.31     Patient Active Problem List   Diagnosis   • New onset a-fib (CMS/HCC)   • Acute deep vein thrombosis (DVT) of popliteal vein of both lower extremities (CMS/HCC)   • Hypertension   • Aortic valve stenosis, mild     Past Medical History:   Diagnosis Date   • Dementia (CMS/HCC)    • Hyperlipidemia    • Hypertension    • Murmur    • Osteoporosis      Past Surgical History:   Procedure Laterality Date   • APPENDECTOMY     • FEMUR FRACTURE SURGERY Left 2020   • HERNIA REPAIR     • HYSTERECTOMY       General Information     Row Name 20 1108          PT Evaluation Time/Intention    Document Type  evaluation  -EJ     Mode of Treatment  physical therapy  -     Row Name 20 1108          General Information    Patient Profile Reviewed?  yes  -EJ     Prior Level of Function  independent:;all household mobility;community mobility;ADL's;home management dtrs take her shopping normally. Since fx pt has not been shopping.  -EJ     Existing Precautions/Restrictions  cardiac  -EJ     Row Name 20 1108          Relationship/Environment    Lives With  alone  -EJ     Row Name 20 1108          Resource/Environmental Concerns    Current Living Arrangements  home/apartment/condo  -EJ     Row Name 20 1108          Cognitive Assessment/Intervention- PT/OT    Orientation Status (Cognition)  oriented to;person;verbal cues/prompts needed for orientation;place;time;situation  -EJ     Cognitive Assessment/Intervention Comment  Pt initially answers nursing home, october as current month. Pt able to correct with minimal cueing.   -EJ     Row Name 20 1108          Safety Issues, Functional Mobility     Impairments Affecting Function (Mobility)  cognition  -EJ     Comment, Safety Issues/Impairments (Mobility)  PT discussed pt with OT, OT to further assess cognition r/t ADLs and family support.  -EJ       User Key  (r) = Recorded By, (t) = Taken By, (c) = Cosigned By    Initials Name Provider Type    Migdalia Sotelo PT Physical Therapist        Mobility     Row Name 03/18/20 1115          Bed Mobility Assessment/Treatment    Bed Mobility Assessment/Treatment  bed mobility (all) activities  -EJ     [place] Level (Bed Mobility)  conditional independence  -EJ     Assistive Device (Bed Mobility)  head of bed elevated  -EJ     Row Name 03/18/20 1115          Sit-Stand Transfer    Sit-Stand [place] (Transfers)  conditional independence  -EJ     Assistive Device (Sit-Stand Transfers)  walker, front-wheeled  -EJ     Row Name 03/18/20 1115          Gait/Stairs Assessment/Training    [place] Level (Gait)  conditional independence  -EJ     Assistive Device (Gait)  walker, front-wheeled  -EJ     Distance in Feet (Gait)  350  -EJ     Pattern (Gait)  step-through  -EJ     Comment (Gait/Stairs)  Pt ambulates with step through gait pattern, upright posture, fluid movement, heel strike. No LOB noted. VSS.  -EJ       User Key  (r) = Recorded By, (t) = Taken By, (c) = Cosigned By    Initials Name Provider Type    Migdalia Sotelo PT Physical Therapist        Obj/Interventions     Row Name 03/18/20 1117          General ROM    GENERAL ROM COMMENTS  WNL BLEs.  -EJ     Row Name 03/18/20 1117          MMT (Manual Muscle Testing)    General MMT Comments  LLE hip flexion  4+/5; Remainder 5/5 BLEs.   -EJ     Row Name 03/18/20 1117          Static Sitting Balance    Level of [place] (Unsupported Sitting, Static Balance)  independent  -EJ     Row Name 03/18/20 1117          Dynamic Sitting Balance    Level of [place], Reaches Outside Midline (Sitting, Dynamic Balance)  independent  -EJ     Row Name  03/18/20 1117          Static Standing Balance    Level of [place] (Supported Standing, Static Balance)  conditional independence  -EJ     Assistive Device Utilized (Supported Standing, Static Balance)  walker, rolling  -EJ     Row Name 03/18/20 1117          Dynamic Standing Balance    Level of [place], Reaches Outside Midline (Standing, Dynamic Balance)  conditional independence  -EJ     Assistive Device Utilized (Supported Standing, Dynamic Balance)  walker, rolling  -EJ     Row Name 03/18/20 1117          Sensory Assessment/Intervention    Sensory General Assessment  no sensation deficits identified  -EJ       User Key  (r) = Recorded By, (t) = Taken By, (c) = Cosigned By    Initials Name Provider Type    EJ Migdalia Alvarado, PT Physical Therapist        Goals/Plan     Row Name 03/18/20 1123          Bed Mobility Goal 1 (PT)    Activity/Assistive Device (Bed Mobility Goal 1, PT)  bed mobility activities, all  -EJ     [place] Level/Cues Needed (Bed Mobility Goal 1, PT)  conditional independence  -EJ     Progress/Outcomes (Bed Mobility Goal 1, PT)  goal met  -EJ     Row Name 03/18/20 1123          Transfer Goal 1 (PT)    Activity/Assistive Device (Transfer Goal 1, PT)  sit-to-stand/stand-to-sit  -EJ     [place] Level/Cues Needed (Transfer Goal 1, PT)  conditional independence  -EJ     Progress/Outcome (Transfer Goal 1, PT)  goal met  -EJ     Row Name 03/18/20 1123          Gait Training Goal 1 (PT)    Activity/Assistive Device (Gait Training Goal 1, PT)  gait (walking locomotion)  -EJ     [place] Level (Gait Training Goal 1, PT)  conditional independence  -EJ     Progress/Outcome (Gait Training Goal 1, PT)  goal met  -EJ       User Key  (r) = Recorded By, (t) = Taken By, (c) = Cosigned By    Initials Name Provider Type    EJ Migdalia Alvarado, PT Physical Therapist        Clinical Impression     Row Name 03/18/20 1119          Pain Assessment    Additional Documentation  Pain Scale:  Numbers Pre/Post-Treatment (Group)  -EJ     Row Name 03/18/20 1119          Pain Scale: Numbers Pre/Post-Treatment    Pain Scale: Numbers, Pretreatment  0/10 - no pain  -EJ     Pain Scale: Numbers, Post-Treatment  0/10 - no pain  -EJ     Pain Intervention(s)  Repositioned;Ambulation/increased activity  -EJ     Row Name 03/18/20 1119          Plan of Care Review    Plan of Care Reviewed With  patient;daughter  -EJ     Outcome Summary  Pt ambulates in [place] x 350 ft with conditional independence using RWx. Pt demonstrates good strength with slight weakness in LLE hip flexors. Pt otherwise with adequate mobility to return home in ILF. OT to further assess cognition. HHPT recommended to continue upon d/c.  -EJ     Row Name 03/18/20 1119          Physical Therapy Clinical Impression    Patient/Family Goals Statement (PT Clinical Impression)  go home.  -EJ     Criteria for Skilled Interventions Met (PT Clinical Impression)  no  -EJ     Row Name 03/18/20 1119          Vital Signs    Pre Systolic BP Rehab  129  -EJ     Pre Treatment Diastolic BP  73  -EJ     Post Systolic BP Rehab  146  -EJ     Post Treatment Diastolic BP  78  -EJ     Pretreatment Heart Rate (beats/min)  59  -EJ     Intratreatment Heart Rate (beats/min)  79  -EJ     Posttreatment Heart Rate (beats/min)  73  -EJ     Pre SpO2 (%)  95  -EJ     O2 Delivery Pre Treatment  room air  -EJ     Intra SpO2 (%)  94  -EJ     O2 Delivery Intra Treatment  room air  -EJ     Post SpO2 (%)  95  -EJ     O2 Delivery Post Treatment  room air  -EJ     Row Name 03/18/20 1119          Positioning and Restraints    Pre-Treatment Position  in bed  -EJ     Post Treatment Position  bed  -EJ     In Bed  supine;call light within reach;encouraged to call for assist;with family/caregiver;notified nsg  -EJ       User Key  (r) = Recorded By, (t) = Taken By, (c) = Cosigned By    Initials Name Provider Type    EJ Migdalia Alvarado PT Physical Therapist        Outcome Measures     Row Name  03/18/20 1124          How much help from another person do you currently need...    Turning from your back to your side while in flat bed without using bedrails?  4  -EJ     Moving from lying on back to sitting on the side of a flat bed without bedrails?  4  -EJ     Moving to and from a bed to a chair (including a wheelchair)?  4  -EJ     Standing up from a chair using your arms (e.g., wheelchair, bedside chair)?  4  -EJ     Climbing 3-5 steps with a railing?  3  -EJ     To walk in hospital room?  4  -EJ     AM-PAC 6 Clicks Score (PT)  23  -     Row Name 03/18/20 1124          Functional Assessment    Outcome Measure Options  AM-PAC 6 Clicks Basic Mobility (PT)  -       User Key  (r) = Recorded By, (t) = Taken By, (c) = Cosigned By    Initials Name Provider Type    Migdalia Sotelo PT Physical Therapist        Physical Therapy Education                 Title: PT OT SLP Therapies (Done)     Topic: Physical Therapy (Done)     Point: Mobility training (Done)     Description:   Instruct learner(s) on safety and technique for assisting patient out of bed, chair or wheelchair.  Instruct in the proper use of assistive devices, such as walker, crutches, cane or brace.              Patient Friendly Description:   It's important to get you on your feet again, but we need to do so in a way that is safe for you. Falling has serious consequences, and your personal safety is the most important thing of all.        When it's time to get out of bed, one of us or a family member will sit next to you on the bed to give you support.     If your doctor or nurse tells you to use a walker, crutches, a cane, or a brace, be sure you use it every time you get out of bed, even if you think you don't need it.    Learning Progress Summary           Patient Acceptance, E, VU,NR by JESSICA at 3/18/2020 1125                               User Key     Initials Effective Dates Name Provider Type [facility] 11/20/18 -  Migdalia Alvarado  TIFFANIE PT Physical Therapist PT              PT Recommendation and Plan     Outcome Summary/Treatment Plan (PT)  Anticipated Discharge Disposition (PT): home with home health(ILF.)  Plan of Care Reviewed With: patient, daughter  Outcome Summary: Pt ambulates in [place] x 350 ft with conditional independence using RWx. Pt demonstrates good strength with slight weakness in LLE hip flexors. Pt otherwise with adequate mobility to return home in ILF. OT to further assess cognition. HHPT recommended to continue upon d/c.     Time Calculation:   PT Charges     Row Name 03/18/20 1125             Time Calculation    Start Time  1045  -EJ      PT Received On  03/18/20  -EJ         Time Calculation- PT    Total Timed Code Minutes- PT  0 minute(s)  -EJ        User Key  (r) = Recorded By, (t) = Taken By, (c) = Cosigned By    Initials Name Provider Type    EJ Migdalia Alvarado PT Physical Therapist        Therapy Charges for Today     Code Description Service Date Service Provider Modifiers Qty    [id number] HC PT EVAL LOW COMPLEXITY 4 3/18/2020 Migdalia Alvarado PT GP 1          PT G-Codes  Outcome Measure Options: AM-PAC 6 Clicks Basic Mobility (PT)  AM-PAC 6 Clicks Score (PT): 23    Migdalia Temple PT  3/18/2020

## 2020-03-20 NOTE — DISCHARGE SUMMARY
"[facility]  Discharge Summary  Patient: Aamnda Glover     Date of Admission: 3/16/2020  Date of Discharge:  3/18/2020    PCP: Denis Lr MD    Problem List:    New onset a-fib (CMS/HCC)    Acute deep vein thrombosis (DVT) of popliteal vein of both lower extremities (CMS/HCC)    Hypertension    Aortic valve stenosis, mild      Presenting Problem:  New onset a-fib (CMS/HCC) [I48.91]  New onset a-fib (CMS/HCC) [I48.91]    Consults:   Consults     No orders found from 2/16/2020 to 3/17/2020.          Procedures Performed:         History of Present Illness and Hospital Course  Amanda Glover is a 87 y.o. female presented with newly diagnosed/discovered afib with RVR she was generally asymptomatic.  It was noted after her HR was elevated on her home BP machine.  She also had endorsed worsening leg swelling bilaterally after a femur fracture surgery in January.  She was treated with metoprolol and digoxin and convert to sinus rhythm prior to discharge.  A lower extremity duplex did reveal bilateral lower extremity DVTs.  The patient was started on apixaban 10mg BID for 7 days then will transition to 5mg BID for treatment of her DVTs as well as stroke prevention from her afib.  She will follow up in one month in the cardiology clinic.      Review of Systems:   A full 14 point review of systems has been performed.  Pertinent positives and negatives are included in the HPI.  Otherwise negative.      Pertinent Test Results:     Vital Sign Min/Max for last 24 hours  No data recorded   No data recorded   No data recorded   No data recorded   No data recorded   No data recorded   No data recorded     Flowsheet Rows      First Filed Value   Admission Height  160 cm (63\") Documented at 03/16/2020 1203   Admission Weight  70.8 kg (156 lb) Documented at 03/16/2020 1203          Telemetry: sinus on day of discharge    No intake or output data in the 24 hours ending 03/20/20 1006  Intake & " Output (last 3 days)       03/17 0701 - 03/18 0700 03/18 0701 - 03/19 0700 03/19 0701 - 03/20 0700 03/20 0701 - 03/21 0700            Urine Unmeasured Occurrence 2 x              Physical Exam:  Physical Exam   See day of discharge progress note    Discharge Disposition  Home or Self Care    Discharge Medications     Discharge Medications      New Medications      Instructions Start Date   apixaban 5 MG tablet tablet  Commonly known as:  ELIQUIS   10 mg, Oral, Every 12 Hours Scheduled      apixaban 5 MG tablet tablet  Commonly known as:  ELIQUIS   5 mg, Oral, Every 12 Hours Scheduled   Start Date:  March 24, 2020        Continue These Medications      Instructions Start Date   calcium carbonate 500 MG chewable tablet  Commonly known as:  TUMS   1 tablet, Oral, Daily      CENTRAL-LEX PO   Oral      Cholecalciferol 25 MCG (1000 UT) capsule   1,000 Units, Oral, Daily      metoprolol tartrate 25 MG tablet  Commonly known as:  LOPRESSOR   25 mg, Oral, 2 Times Daily      Senna 8.6 MG capsule   Oral      vitamin E 400 UNIT capsule   400 Units, Oral, Daily         Stop These Medications    benazepril 20 MG tablet  Commonly known as:  LOTENSIN     furosemide 20 MG tablet  Commonly known as:  LASIX            Discharge Diet   Heart Healthy    Activity at Discharge  As tolerated      Follow-up Appointments  Future Appointments   Date Time Provider Department Center   5/5/2020 11:15 AM Denis Chand MD [facility] None     Additional Instructions for the Follow-ups that You Need to Schedule     Discharge Follow-up with PCP   As directed       Currently Documented PCP:    Denis Lr MD    PCP Phone Number:    [phone]     Follow Up Details:  1-2 weeks         Discharge Follow-up with Specialty: Jagr[place]; 1 Month   As directed      Specialty:  J[place]    Follow Up:  1 Month               Test Results Pending at Discharge  None      Time:  < 30 minutes    Denis Chand MD  03/20/20  10:06

## 2020-03-24 ENCOUNTER — TELEPHONE (OUTPATIENT)
Dept: CARDIOLOGY | Facility: CLINIC | Age: 85
End: 2020-03-24

## 2020-03-24 RX ORDER — BENAZEPRIL HYDROCHLORIDE 20 MG/1
20 TABLET ORAL DAILY
Qty: 90 TABLET | Refills: 3 | Status: SHIPPED | OUTPATIENT
Start: 2020-03-24

## 2020-03-24 NOTE — TELEPHONE ENCOUNTER
Patient's family member called concerned about medication and heart rate.    She states that the patient was recently discharged from [facility], and was instructed to hold metoprolol 25 mg if heart rate was below 60 bpm.    The patient has consistently stayed in the 50's at rest and cannot take her medication.     The patients BP has consistently been 150's-170's SBP. They are worried that her BP will keep rising.    Please advise

## 2020-03-24 NOTE — TELEPHONE ENCOUNTER
Lets have her go back to benazepril 20mg daily which she was on pre- hospitalization.  Ok to take metoprolol and hold for HR  < 50 bpm so that hopefully stay ahead of any afib recurrences

## 2020-04-02 ENCOUNTER — HOSPITAL ENCOUNTER (EMERGENCY)
Facility: HOSPITAL | Age: 85
Discharge: HOME | End: 2020-04-03
Attending: EMERGENCY MEDICINE

## 2020-04-02 DIAGNOSIS — I10 ASYMPTOMATIC HYPERTENSION: Primary | ICD-10-CM

## 2020-04-02 DIAGNOSIS — N39.0 URINARY TRACT INFECTION IN FEMALE: ICD-10-CM

## 2020-04-02 LAB
ANION GAP SERPL CALCULATED.3IONS-SCNC: 12 MMOL/L (ref 5–15)
BUN BLD-MCNC: 17 MG/DL (ref 8–23)
BUN/CREAT SERPL: 14.9 (ref 7–25)
CALCIUM SPEC-SCNC: 9.5 MG/DL (ref 8.6–10.5)
CHLORIDE SERPL-SCNC: 102 MMOL/L (ref 98–107)
CO2 SERPL-SCNC: 27 MMOL/L (ref 22–29)
CREAT BLD-MCNC: 1.14 MG/DL (ref 0.57–1)
GFR SERPL CREATININE-BSD FRML MDRD: 45 ML/MIN/1.73
GLUCOSE BLD-MCNC: 105 MG/DL (ref 65–99)
HOLD SPECIMEN: NORMAL
HOLD SPECIMEN: NORMAL
POTASSIUM BLD-SCNC: 4 MMOL/L (ref 3.5–5.2)
SODIUM BLD-SCNC: 141 MMOL/L (ref 136–145)
TROPONIN T SERPL-MCNC: <0.01 NG/ML (ref 0–0.03)
WHOLE BLOOD HOLD SPECIMEN: NORMAL
WHOLE BLOOD HOLD SPECIMEN: NORMAL

## 2020-04-02 PROCEDURE — 93005 ELECTROCARDIOGRAM TRACING: CPT | Performed by: NURSE PRACTITIONER

## 2020-04-02 PROCEDURE — 84484 ASSAY OF TROPONIN QUANT: CPT | Performed by: NURSE PRACTITIONER

## 2020-04-02 PROCEDURE — 99284 EMERGENCY DEPT VISIT MOD MDM: CPT

## 2020-04-02 PROCEDURE — 81001 URINALYSIS AUTO W/SCOPE: CPT | Performed by: NURSE PRACTITIONER

## 2020-04-02 PROCEDURE — 80048 BASIC METABOLIC PNL TOTAL CA: CPT | Performed by: NURSE PRACTITIONER

## 2020-04-02 PROCEDURE — 85025 COMPLETE CBC W/AUTO DIFF WBC: CPT | Performed by: NURSE PRACTITIONER

## 2020-04-02 RX ORDER — CLONIDINE HYDROCHLORIDE 0.1 MG/1
0.1 TABLET ORAL ONCE
Status: COMPLETED | OUTPATIENT
Start: 2020-04-02 | End: 2020-04-03

## 2020-04-02 RX ORDER — SODIUM CHLORIDE 0.9 % (FLUSH) 0.9 %
10 SYRINGE (ML) INJECTION AS NEEDED
Status: DISCONTINUED | OUTPATIENT
Start: 2020-04-02 | End: 2020-04-03 | Stop reason: HOSPADM

## 2020-04-03 VITALS
SYSTOLIC BLOOD PRESSURE: 195 MMHG | HEART RATE: 52 BPM | BODY MASS INDEX: 26.63 KG/M2 | RESPIRATION RATE: 17 BRPM | HEIGHT: 63 IN | OXYGEN SATURATION: 93 % | WEIGHT: 150.3 LBS | DIASTOLIC BLOOD PRESSURE: 89 MMHG | TEMPERATURE: 98 F

## 2020-04-03 LAB
BACTERIA UR QL AUTO: ABNORMAL /HPF
BASOPHILS # BLD AUTO: 0.02 10*3/MM3 (ref 0–0.2)
BASOPHILS NFR BLD AUTO: 0.3 % (ref 0–1.5)
BILIRUB UR QL STRIP: NEGATIVE
CLARITY UR: CLEAR
COLOR UR: YELLOW
DEPRECATED RDW RBC AUTO: 46.8 FL (ref 37–54)
EOSINOPHIL # BLD AUTO: 0.14 10*3/MM3 (ref 0–0.4)
EOSINOPHIL NFR BLD AUTO: 2.2 % (ref 0.3–6.2)
ERYTHROCYTE [DISTWIDTH] IN BLOOD BY AUTOMATED COUNT: 14.6 % (ref 12.3–15.4)
GLUCOSE UR STRIP-MCNC: NEGATIVE MG/DL
HCT VFR BLD AUTO: 38.3 % (ref 34–46.6)
HGB BLD-MCNC: 11.9 G/DL (ref 12–15.9)
HGB UR QL STRIP.AUTO: NEGATIVE
HYALINE CASTS UR QL AUTO: ABNORMAL /LPF
IMM GRANULOCYTES # BLD AUTO: 0.05 10*3/MM3 (ref 0–0.05)
IMM GRANULOCYTES NFR BLD AUTO: 0.8 % (ref 0–0.5)
KETONES UR QL STRIP: NEGATIVE
LEUKOCYTE ESTERASE UR QL STRIP.AUTO: ABNORMAL
LYMPHOCYTES # BLD AUTO: 1.55 10*3/MM3 (ref 0.7–3.1)
LYMPHOCYTES NFR BLD AUTO: 24.6 % (ref 19.6–45.3)
MCH RBC QN AUTO: 27.7 PG (ref 26.6–33)
MCHC RBC AUTO-ENTMCNC: 31.1 G/DL (ref 31.5–35.7)
MCV RBC AUTO: 89.3 FL (ref 79–97)
MONOCYTES # BLD AUTO: 0.56 10*3/MM3 (ref 0.1–0.9)
MONOCYTES NFR BLD AUTO: 8.9 % (ref 5–12)
NEUTROPHILS # BLD AUTO: 3.97 10*3/MM3 (ref 1.7–7)
NEUTROPHILS NFR BLD AUTO: 63.2 % (ref 42.7–76)
NITRITE UR QL STRIP: POSITIVE
NRBC BLD AUTO-RTO: 0 /100 WBC (ref 0–0.2)
PH UR STRIP.AUTO: 8 [PH] (ref 5–8)
PLATELET # BLD AUTO: 251 10*3/MM3 (ref 140–450)
PMV BLD AUTO: 10.8 FL (ref 6–12)
PROT UR QL STRIP: ABNORMAL
RBC # BLD AUTO: 4.29 10*6/MM3 (ref 3.77–5.28)
RBC # UR: ABNORMAL /HPF
REF LAB TEST METHOD: ABNORMAL
SP GR UR STRIP: 1.01 (ref 1–1.03)
SQUAMOUS #/AREA URNS HPF: ABNORMAL /HPF
UROBILINOGEN UR QL STRIP: ABNORMAL
WBC NRBC COR # BLD: 6.29 10*3/MM3 (ref 3.4–10.8)
WBC UR QL AUTO: ABNORMAL /HPF

## 2020-04-03 RX ORDER — CEFDINIR 300 MG/1
300 CAPSULE ORAL 2 TIMES DAILY
Qty: 20 CAPSULE | Refills: 0 | Status: SHIPPED | OUTPATIENT
Start: 2020-04-03 | End: 2020-04-03 | Stop reason: SDUPTHER

## 2020-04-03 RX ORDER — CEFDINIR 300 MG/1
300 CAPSULE ORAL 2 TIMES DAILY
Qty: 20 CAPSULE | Refills: 0 | Status: SHIPPED | OUTPATIENT
Start: 2020-04-03 | End: 2020-06-30

## 2020-04-03 RX ADMIN — CLONIDINE HYDROCHLORIDE 0.1 MG: 0.1 TABLET ORAL at 00:09

## 2020-04-03 NOTE — ED PROVIDER NOTES
Subjective   Ms. Amanda Glover is a 87 y.o. female who presents with her daughter to the ED with c/o hypertension. The patient reports that her blood pressure measured 186/110 earlier tonight, prior to taking her hypertension medication, which prompted her to seek medical attention. Her blood pressure typically ranges around 150/85. She added that she took her hypertension medication prior to coming to the ED, which has improved her symptoms since onset. She denies chest pain, shortness of breath, visual disturbances, headache, nausea, and vomiting. She denies missing any doses of her hypertension medication recently. She takes 25 mg of Metoprolol, 20 mg of Benazepril, and Eliquis. She notes a history atrial fibrillation and deep vein thrombosis. There are no other acute complaints at this time.      History provided by:  Patient and relative   used: No    Hypertension   Associated symptoms: no chest pain, no dizziness, no fever, no headaches, no nausea, no shortness of breath and not vomiting        Review of Systems   Constitutional: Negative for chills and fever.   HENT: Negative for congestion and rhinorrhea.    Eyes: Negative for visual disturbance.   Respiratory: Negative for cough and shortness of breath.    Cardiovascular: Negative for chest pain.   Gastrointestinal: Negative for nausea and vomiting.   Genitourinary: Negative for difficulty urinating and dysuria.   Neurological: Negative for dizziness and headaches.   All other systems reviewed and are negative.      Past Medical History:   Diagnosis Date   • Dementia (CMS/HCC)    • Hyperlipidemia    • Hypertension    • Murmur    • Osteoporosis        Allergies   Allergen Reactions   • Influenza Vaccine Live Other (See Comments)     Patient unsure       Past Surgical History:   Procedure Laterality Date   • APPENDECTOMY     • FEMUR FRACTURE SURGERY Left 01/2020   • HERNIA REPAIR     • HYSTERECTOMY         No family history on  file.    Social History     Socioeconomic History   • Marital status:      Spouse name: Not on file   • Number of children: Not on file   • Years of education: Not on file   • Highest education level: Not on file   Tobacco Use   • Smoking status: Never Smoker   • Smokeless tobacco: Never Used         Objective   Physical Exam   Constitutional: She is oriented to person, place, and time. She appears well-developed and well-nourished. No distress.   HENT:   Head: Normocephalic and atraumatic.   Right Ear: Tympanic membrane normal.   Left Ear: Tympanic membrane normal.   Nose: Nose normal.   Mouth/Throat: Oropharynx is clear and moist. No trismus in the jaw.   Eyes: Conjunctivae are normal. No scleral icterus.   Neck: Normal range of motion. Neck supple.   Cardiovascular: Normal rate, regular rhythm, normal heart sounds and intact distal pulses.   Pulmonary/Chest: Effort normal and breath sounds normal. No respiratory distress.   Abdominal: Soft. Bowel sounds are normal. There is no tenderness.   Musculoskeletal: Normal range of motion. She exhibits no edema or tenderness.   Neurological: She is alert and oriented to person, place, and time.   Skin: Skin is warm and dry.   Psychiatric: She has a normal mood and affect. Her behavior is normal.   Nursing note and vitals reviewed.      Procedures         ED Course  ED Course as of Apr 03 1925   Thu Apr 02, 2020   2218 COVID-19 RISK SCREEN    Has the patient had close contact without PPE with a lab confirmed COVID-19 (+) person or a person under investigation (PUI) for COVID-19 infection?  -- No     Has the patient provided care or had close contact with COVID-19(+) patient in a healthcare facility? --  No    [KO]   2306 Creatinine(!): 1.14 [KG]   Fri Apr 03, 2020   0000 Results have been discussed with pt at this time. Pt BP has decreased. Pt is followed by Dr. Lr. Pt and family will contact PCP in the am for any med changes. Pt will be placed on Omnicef at this  time. Pt agrees and verb understanding.     [KG]   0009 Bacteria, UA(!): 4+ [KG]   0009 Nitrite, UA(!): Positive [KG]   0009 Leukocytes, UA(!): Trace [KG]   0009 WBC, UA(!): 3-5 [KG]      ED Course User Index  [KG] Fouzia Chairez, PATRICIO  [KO] Daxa Duanway     Recent Results (from the past 24 hour(s))   Light Blue Top    Collection Time: 04/02/20  9:19 PM   Result Value Ref Range    Extra Tube hold for add-on    Green Top (Gel)    Collection Time: 04/02/20  9:19 PM   Result Value Ref Range    Extra Tube Hold for add-ons.    Lavender Top    Collection Time: 04/02/20  9:19 PM   Result Value Ref Range    Extra Tube hold for add-on    Gold Top - SST    Collection Time: 04/02/20  9:19 PM   Result Value Ref Range    Extra Tube Hold for add-ons.    Basic Metabolic Panel    Collection Time: 04/02/20  9:19 PM   Result Value Ref Range    Glucose 105 (H) 65 - 99 mg/dL    BUN 17 8 - 23 mg/dL    Creatinine 1.14 (H) 0.57 - 1.00 mg/dL    Sodium 141 136 - 145 mmol/L    Potassium 4.0 3.5 - 5.2 mmol/L    Chloride 102 98 - 107 mmol/L    CO2 27.0 22.0 - 29.0 mmol/L    Calcium 9.5 8.6 - 10.5 mg/dL    eGFR Non African Amer 45 (L) >60 mL/min/1.73    BUN/Creatinine Ratio 14.9 7.0 - 25.0    Anion Gap 12.0 5.0 - 15.0 mmol/L   Troponin    Collection Time: 04/02/20  9:19 PM   Result Value Ref Range    Troponin T <0.010 0.000 - 0.030 ng/mL   CBC Auto Differential    Collection Time: 04/02/20  9:19 PM   Result Value Ref Range    WBC 6.29 3.40 - 10.80 10*3/mm3    RBC 4.29 3.77 - 5.28 10*6/mm3    Hemoglobin 11.9 (L) 12.0 - 15.9 g/dL    Hematocrit 38.3 34.0 - 46.6 %    MCV 89.3 79.0 - 97.0 fL    MCH 27.7 26.6 - 33.0 pg    MCHC 31.1 (L) 31.5 - 35.7 g/dL    RDW 14.6 12.3 - 15.4 %    RDW-SD 46.8 37.0 - 54.0 fl    MPV 10.8 6.0 - 12.0 fL    Platelets 251 140 - 450 10*3/mm3    Neutrophil % 63.2 42.7 - 76.0 %    Lymphocyte % 24.6 19.6 - 45.3 %    Monocyte % 8.9 5.0 - 12.0 %    Eosinophil % 2.2 0.3 - 6.2 %    Basophil % 0.3 0.0 - 1.5 %    Immature  Grans % 0.8 (H) 0.0 - 0.5 %    Neutrophils, Absolute 3.97 1.70 - 7.00 10*3/mm3    Lymphocytes, Absolute 1.55 0.70 - 3.10 10*3/mm3    Monocytes, Absolute 0.56 0.10 - 0.90 10*3/mm3    Eosinophils, Absolute 0.14 0.00 - 0.40 10*3/mm3    Basophils, Absolute 0.02 0.00 - 0.20 10*3/mm3    Immature Grans, Absolute 0.05 0.00 - 0.05 10*3/mm3    nRBC 0.0 0.0 - 0.2 /100 WBC   Urinalysis With Microscopic If Indicated (No Culture) - Urine, Clean Catch    Collection Time: 04/02/20 11:25 PM   Result Value Ref Range    Color, UA Yellow Yellow, Straw    Appearance, UA Clear Clear    pH, UA 8.0 5.0 - 8.0    Specific Gravity, UA 1.015 1.001 - 1.030    Glucose, UA Negative Negative    Ketones, UA Negative Negative    Bilirubin, UA Negative Negative    Blood, UA Negative Negative    Protein, UA Trace (A) Negative    Leuk Esterase, UA Trace (A) Negative    Nitrite, UA Positive (A) Negative    Urobilinogen, UA 0.2 E.U./dL 0.2 - 1.0 E.U./dL   Urinalysis, Microscopic Only - Urine, Clean Catch    Collection Time: 04/02/20 11:25 PM   Result Value Ref Range    RBC, UA 0-2 None Seen, 0-2 /HPF    WBC, UA 3-5 (A) None Seen, 0-2 /HPF    Bacteria, UA 4+ (A) None Seen, Trace /HPF    Squamous Epithelial Cells, UA 0-2 None Seen, 0-2 /HPF    Hyaline Casts, UA 0-6 0 - 6 /LPF    Methodology Automated Microscopy      Note: In addition to lab results from this visit, the labs listed above may include labs taken at another facility or during a different encounter within the last 24 hours. Please correlate lab times with ED admission and discharge times for further clarification of the services performed during this visit.    No orders to display     Vitals:    04/03/20 0000 04/03/20 0009 04/03/20 0012 04/03/20 0032   BP: (!) 190/88 (!) 190/88  (!) 195/89   BP Location:    Right arm   Patient Position:    Sitting   Pulse:  52 70 52   Resp:    17   Temp:    98 °F (36.7 °C)   TempSrc:    Oral   SpO2: 93%  93% 93%   Weight:       Height:         Medications    cloNIDine (CATAPRES) tablet 0.1 mg (0.1 mg Oral Given 4/3/20 0009)     ECG/EMG Results (last 24 hours)     ** No results found for the last 24 hours. **        ECG 12 Lead   Final Result   Test Reason : htn, h/o afib   Blood Pressure : **/** mmHG   Vent. Rate : 053 BPM     Atrial Rate : 053 BPM      P-R Int : 150 ms          QRS Dur : 074 ms       QT Int : 448 ms       P-R-T Axes : 060 001 005 degrees      QTc Int : 420 ms      Sinus bradycardia with premature atrial complexes   Otherwise normal ECG   When compared with ECG of 18-MAR-2020 01:44,   Sinus rhythm has replaced Atrial fibrillation   ST no longer depressed in Anterior leads   Nonspecific T wave abnormality no longer evident in Anterolateral leads   QT has lengthened   Confirmed by MD ELMER, ALBERTO ([id number]) on 4/3/2020 8:33:50 AM      Referred By:  EDMD           Confirmed By:ALBERTO PAYNE MD        Recent Results (from the past 24 hour(s))   Light Blue Top    Collection Time: 04/02/20  9:19 PM   Result Value Ref Range    Extra Tube hold for add-on    Green Top (Gel)    Collection Time: 04/02/20  9:19 PM   Result Value Ref Range    Extra Tube Hold for add-ons.    Lavender Top    Collection Time: 04/02/20  9:19 PM   Result Value Ref Range    Extra Tube hold for add-on    Gold Top - SST    Collection Time: 04/02/20  9:19 PM   Result Value Ref Range    Extra Tube Hold for add-ons.    Basic Metabolic Panel    Collection Time: 04/02/20  9:19 PM   Result Value Ref Range    Glucose 105 (H) 65 - 99 mg/dL    BUN 17 8 - 23 mg/dL    Creatinine 1.14 (H) 0.57 - 1.00 mg/dL    Sodium 141 136 - 145 mmol/L    Potassium 4.0 3.5 - 5.2 mmol/L    Chloride 102 98 - 107 mmol/L    CO2 27.0 22.0 - 29.0 mmol/L    Calcium 9.5 8.6 - 10.5 mg/dL    eGFR Non African Amer 45 (L) >60 mL/min/1.73    BUN/Creatinine Ratio 14.9 7.0 - 25.0    Anion Gap 12.0 5.0 - 15.0 mmol/L   Troponin    Collection Time: 04/02/20  9:19 PM   Result Value Ref Range    Troponin T <0.010 0.000 - 0.030 ng/mL   CBC  Auto Differential    Collection Time: 04/02/20  9:19 PM   Result Value Ref Range    WBC 6.29 3.40 - 10.80 10*3/mm3    RBC 4.29 3.77 - 5.28 10*6/mm3    Hemoglobin 11.9 (L) 12.0 - 15.9 g/dL    Hematocrit 38.3 34.0 - 46.6 %    MCV 89.3 79.0 - 97.0 fL    MCH 27.7 26.6 - 33.0 pg    MCHC 31.1 (L) 31.5 - 35.7 g/dL    RDW 14.6 12.3 - 15.4 %    RDW-SD 46.8 37.0 - 54.0 fl    MPV 10.8 6.0 - 12.0 fL    Platelets 251 140 - 450 10*3/mm3    Neutrophil % 63.2 42.7 - 76.0 %    Lymphocyte % 24.6 19.6 - 45.3 %    Monocyte % 8.9 5.0 - 12.0 %    Eosinophil % 2.2 0.3 - 6.2 %    Basophil % 0.3 0.0 - 1.5 %    Immature Grans % 0.8 (H) 0.0 - 0.5 %    Neutrophils, Absolute 3.97 1.70 - 7.00 10*3/mm3    Lymphocytes, Absolute 1.55 0.70 - 3.10 10*3/mm3    Monocytes, Absolute 0.56 0.10 - 0.90 10*3/mm3    Eosinophils, Absolute 0.14 0.00 - 0.40 10*3/mm3    Basophils, Absolute 0.02 0.00 - 0.20 10*3/mm3    Immature Grans, Absolute 0.05 0.00 - 0.05 10*3/mm3    nRBC 0.0 0.0 - 0.2 /100 WBC   Urinalysis With Microscopic If Indicated (No Culture) - Urine, Clean Catch    Collection Time: 04/02/20 11:25 PM   Result Value Ref Range    Color, UA Yellow Yellow, Straw    Appearance, UA Clear Clear    pH, UA 8.0 5.0 - 8.0    Specific Gravity, UA 1.015 1.001 - 1.030    Glucose, UA Negative Negative    Ketones, UA Negative Negative    Bilirubin, UA Negative Negative    Blood, UA Negative Negative    Protein, UA Trace (A) Negative    Leuk Esterase, UA Trace (A) Negative    Nitrite, UA Positive (A) Negative    Urobilinogen, UA 0.2 E.U./dL 0.2 - 1.0 E.U./dL   Urinalysis, Microscopic Only - Urine, Clean Catch    Collection Time: 04/02/20 11:25 PM   Result Value Ref Range    RBC, UA 0-2 None Seen, 0-2 /HPF    WBC, UA 3-5 (A) None Seen, 0-2 /HPF    Bacteria, UA 4+ (A) None Seen, Trace /HPF    Squamous Epithelial Cells, UA 0-2 None Seen, 0-2 /HPF    Hyaline Casts, UA 0-6 0 - 6 /LPF    Methodology Automated Microscopy      Note: In addition to lab results from this visit,  the labs listed above may include labs taken at another facility or during a different encounter within the last 24 hours. Please correlate lab times with ED admission and discharge times for further clarification of the services performed during this visit.    No orders to display     Vitals:    04/03/20 0000 04/03/20 0009 04/03/20 0012 04/03/20 0032   BP: (!) 190/88 (!) 190/88  (!) 195/89   BP Location:    Right arm   Patient Position:    Sitting   Pulse:  52 70 52   Resp:    17   Temp:    98 °F (36.7 °C)   TempSrc:    Oral   SpO2: 93%  93% 93%   Weight:       Height:         Medications   cloNIDine (CATAPRES) tablet 0.1 mg (0.1 mg Oral Given 4/3/20 0009)     ECG/EMG Results (last 24 hours)     ** No results found for the last 24 hours. **        ECG 12 Lead   Final Result   Test Reason : htn, h/o afib   Blood Pressure : **/** mmHG   Vent. Rate : 053 BPM     Atrial Rate : 053 BPM      P-R Int : 150 ms          QRS Dur : 074 ms       QT Int : 448 ms       P-R-T Axes : 060 001 005 degrees      QTc Int : 420 ms      Sinus bradycardia with premature atrial complexes   Otherwise normal ECG   When compared with ECG of 18-MAR-2020 01:44,   Sinus rhythm has replaced Atrial fibrillation   ST no longer depressed in Anterior leads   Nonspecific T wave abnormality no longer evident in Anterolateral leads   QT has lengthened   Confirmed by MD ELMER, ALBERTO ([id number]) on 4/3/2020 8:33:50 AM      Referred By:  EDMD           Confirmed By:ALBERTO PAYNE MD                                                   [facility]    Final diagnoses:   Asymptomatic hypertension   Urinary tract infection in female       Documentation assistance provided by dianne Dunaway.  Information recorded by the dianne was done at my direction and has been verified and validated by me.     Daxa Dunaway  04/02/20 2218       Fouzia Chairez APRN  04/03/20 1925

## 2020-06-23 NOTE — PROGRESS NOTES
[facility]  Follow Up Visit  Amanda Glover  10/31/1932    VISIT DATE:  06/30/20    PCP:   Denis Lr MD  [address] [place] [address]          CC:  Paroxysmal Atrial Fibrillation with RVR    Problem List:  1. Paroxysmal Atrial Fibrillation with RVR  2. DVT of popliteal vein bilateral lower extremities  3. Hypertension  4. Mild Aortic Valve Stenosis  5. Hyperlipidemia  6. Dementia  7.  Previous hip fracture repair    Duplex scan of extremity veins including responses to compression and other maneuvers; bilateral 03/17/2020  · Sub-acute right lower extremity deep vein thrombosis noted in the popliteal, posterior tibial and peroneal veins  · Sub-acute left lower extremity deep vein thrombosis noted in the popliteal and peroneal veins    Echo Complete w/Doppler and Color Flow 03/16/2020  · Left ventricular systolic function is normal. Estimated EF variable secondary to underlying atrial fibrillation appears to be in the range of 56 - 60%  · All left ventricular wall segments contract normally.  · There is moderate calcification of the aortic valve.Mild aortic valve regurgitation is present. Mild aortic valve stenosis is present  · Aortic valve mean pressure gradient is 9.0 mmHg. Aortic valve area is 1.4 cm2. Aortic valve dimensionless index is 0.4.  · Mild MAC is present. Mild mitral valve regurgitation is present  · Mild to moderate tricuspid valve regurgitation is present. Estimated right ventricular systolic pressure from tricuspid regurgitation is normal (<35 mmHg).        History of Present Illness:  Amanda Glover  Is a 87 y.o. female with pertinent cardiac history detailed above.  Patient following up for history of atrial fibrillation as well as DVTs.  I saw her originally in March.  At that time she was treated with metoprolol and digoxin and spontaneously converted to sinus rhythm.  The patient was discharged on Eliquis for treatment of her DVTs as well as the atrial  fibrillation.  Patient brought a blood pressure log showing values predominantly in the 120-130 systolic range.  Heart rates 50s to 60s.  Patient has had no chest pain no dyspnea no palpitations.  She does notice ankle edema that is resolved in the mornings but worse by the end of the day.  Is taking Lasix 20 mg daily and discussed she can take an additional Lasix in the afternoon.  Has not had any recurrences of A. fib.  Tolerating Eliquis without any bleeding complications.  No other complaints today      Patient Active Problem List    Diagnosis Date Noted   • Hypertension 03/18/2020   • Aortic valve stenosis, mild 03/18/2020     Note Last Updated: 3/18/2020     1 echo 3-16-20:   · There is moderate calcification of the aortic valve.Mild aortic valve regurgitation is present. Mild aortic valve stenosis is present  · Aortic valve mean pressure gradient is 9.0 mmHg. Aortic valve area is 1.4 cm2. Aortic valve dimensionless index is 0.4.     • Acute deep vein thrombosis (DVT) of popliteal vein of both lower extremities (CMS/[facility]) 03/17/2020     Note Last Updated: 3/17/2020     3/17/2020: Started on Eliquis.     • New onset a-fib (CMS/[facility]) 03/16/2020       Allergies   Allergen Reactions   • Influenza Vaccine Live Other (See Comments)     Patient unsure       Social History     Socioeconomic History   • Marital status:      Spouse name: Not on file   • Number of children: Not on file   • Years of education: Not on file   • Highest education level: Not on file   Tobacco Use   • Smoking status: Never Smoker   • Smokeless tobacco: Never Used   Substance and Sexual Activity   • Alcohol use: Never     Frequency: Never       History reviewed. No pertinent family history.    Current Medications:    Current Outpatient Medications:   •  apixaban (ELIQUIS) 5 MG tablet tablet, Take 1 tablet by mouth Every 12 (Twelve) Hours. Indications: DVT/PE (active thrombosis), Disp: 60 tablet, Rfl: 5  •  benazepril (LOTENSIN) 20 MG  "tablet, Take 1 tablet by mouth Daily., Disp: 90 tablet, Rfl: 3  •  calcium carbonate (TUMS) 500 MG chewable tablet, Chew 1 tablet Daily., Disp: , Rfl:   •  Cholecalciferol 25 MCG (1000 UT) capsule, Take 1,000 Units by mouth Daily., Disp: , Rfl:   •  furosemide (LASIX) 20 MG tablet, Take 20 mg by mouth Daily., Disp: , Rfl:   •  hydrALAZINE (APRESOLINE) 25 MG tablet, Take 25 mg by mouth 3 (Three) Times a Day., Disp: , Rfl:   •  metoprolol tartrate (LOPRESSOR) 25 MG tablet, Take 25 mg by mouth 2 (Two) Times a Day., Disp: , Rfl:   •  Multiple Vitamins-Minerals (CENTRAL-LEX PO), Take  by mouth., Disp: , Rfl:   •  Sennosides (SENNA) 8.6 MG capsule, Take  by mouth., Disp: , Rfl:   •  vitamin E 400 UNIT capsule, Take 400 Units by mouth Daily., Disp: , Rfl:      ROS    Vitals:    06/30/20 1013   BP: 128/68   BP Location: Right arm   Patient Position: Sitting   Pulse: 59   Temp: 97.5 °F (36.4 °C)   SpO2: 99%   Weight: 65.8 kg (145 lb)   Height: 160 cm (63\")       Physical Exam    Diagnostic Data:  Procedures  No results found for: CHLPL, TRIG, HDL, LDLDIRECT  Lab Results   Component Value Date    GLUCOSE 105 (H) 04/02/2020    BUN 17 04/02/2020    CREATININE 1.14 (H) 04/02/2020     04/02/2020    K 4.0 04/02/2020     04/02/2020    CO2 27.0 04/02/2020     No results found for: HGBA1C  Lab Results   Component Value Date    WBC 6.29 04/02/2020    HGB 11.9 (L) 04/02/2020    HCT 38.3 04/02/2020     04/02/2020       Assessment:  No diagnosis found.    Plan:       1.  Paroxysmal A. Fib  -Anticoagulated with Eliquis, Chads VASC  4  -EF 56 to 60%  -On metoprolol and maintaining sinus rhythm    2.  Mild aortic stenosis and regurgitation  -Mean gradient 9 mmHg on last echo, March 2020  -Plan repeat echocardiogram next spring    3.  Bilateral lower extremity DVTs  -Continue indefinite anticoagulation for history of DVT as well as atrial fibrillation    4.  Hypertension  -On metoprolol, hydralazine, benazepril, and blood " pressure is well controlled    Lipids followed by her PCP, Dr. Lr.  We will request most recent blood work    Follow-up in 6 months, sooner as needed    Denis Chand MD

## 2020-06-30 ENCOUNTER — OFFICE VISIT (OUTPATIENT)
Dept: CARDIOLOGY | Facility: CLINIC | Age: 85
End: 2020-06-30

## 2020-06-30 VITALS
WEIGHT: 145 LBS | DIASTOLIC BLOOD PRESSURE: 68 MMHG | TEMPERATURE: 97.5 F | HEART RATE: 59 BPM | HEIGHT: 63 IN | SYSTOLIC BLOOD PRESSURE: 128 MMHG | OXYGEN SATURATION: 99 % | BODY MASS INDEX: 25.69 KG/M2

## 2020-06-30 DIAGNOSIS — I48.0 PAROXYSMAL ATRIAL FIBRILLATION (HCC): Primary | ICD-10-CM

## 2020-06-30 PROCEDURE — 99213 OFFICE O/P EST LOW 20 MIN: CPT | Performed by: INTERNAL MEDICINE

## 2020-06-30 RX ORDER — FUROSEMIDE 20 MG/1
20 TABLET ORAL DAILY
COMMUNITY

## 2020-06-30 RX ORDER — HYDRALAZINE HYDROCHLORIDE 25 MG/1
25 TABLET, FILM COATED ORAL 3 TIMES DAILY
COMMUNITY

## 2023-10-01 ENCOUNTER — APPOINTMENT (OUTPATIENT)
Dept: GENERAL RADIOLOGY | Facility: HOSPITAL | Age: 88
DRG: 308 | End: 2023-10-01
Payer: MEDICARE

## 2023-10-01 ENCOUNTER — HOSPITAL ENCOUNTER (INPATIENT)
Facility: HOSPITAL | Age: 88
LOS: 5 days | Discharge: NURSING, REHAB OR LONG-TERM CARE | DRG: 308 | End: 2023-10-06
Attending: EMERGENCY MEDICINE | Admitting: STUDENT IN AN ORGANIZED HEALTH CARE EDUCATION/TRAINING PROGRAM
Payer: MEDICARE

## 2023-10-01 DIAGNOSIS — I48.91 ATRIAL FIBRILLATION WITH RAPID VENTRICULAR RESPONSE: Primary | ICD-10-CM

## 2023-10-01 DIAGNOSIS — R93.89 ABNORMAL CHEST X-RAY: ICD-10-CM

## 2023-10-01 DIAGNOSIS — Z86.718 HISTORY OF DVT (DEEP VEIN THROMBOSIS): ICD-10-CM

## 2023-10-01 DIAGNOSIS — I48.91 NEW ONSET A-FIB: ICD-10-CM

## 2023-10-01 DIAGNOSIS — I35.0 AORTIC VALVE STENOSIS, MILD: ICD-10-CM

## 2023-10-01 DIAGNOSIS — R09.02 HYPOXIA: ICD-10-CM

## 2023-10-01 DIAGNOSIS — I48.91 ATRIAL FIBRILLATION WITH RVR: ICD-10-CM

## 2023-10-01 DIAGNOSIS — R06.02 SHORTNESS OF BREATH: ICD-10-CM

## 2023-10-01 DIAGNOSIS — I82.433 ACUTE DEEP VEIN THROMBOSIS (DVT) OF POPLITEAL VEIN OF BOTH LOWER EXTREMITIES: ICD-10-CM

## 2023-10-01 DIAGNOSIS — R74.01 ELEVATED TRANSAMINASE LEVEL: ICD-10-CM

## 2023-10-01 DIAGNOSIS — R79.89 ELEVATED SERUM CREATININE: ICD-10-CM

## 2023-10-01 DIAGNOSIS — R79.89 ELEVATED TROPONIN: ICD-10-CM

## 2023-10-01 DIAGNOSIS — I50.9 ACUTE CONGESTIVE HEART FAILURE, UNSPECIFIED HEART FAILURE TYPE: ICD-10-CM

## 2023-10-01 PROBLEM — N18.30 CKD (CHRONIC KIDNEY DISEASE), STAGE III: Status: ACTIVE | Noted: 2023-01-01

## 2023-10-01 PROBLEM — J18.9 COMMUNITY ACQUIRED PNEUMONIA OF RIGHT LUNG: Status: ACTIVE | Noted: 2023-01-01

## 2023-10-01 PROBLEM — J18.9 COMMUNITY ACQUIRED PNEUMONIA OF RIGHT LUNG: Status: ACTIVE | Noted: 2023-10-01

## 2023-10-01 PROBLEM — G30.9 ALZHEIMER'S DEMENTIA: Status: ACTIVE | Noted: 2023-01-01

## 2023-10-01 PROBLEM — G30.9 ALZHEIMER'S DEMENTIA: Status: ACTIVE | Noted: 2023-10-01

## 2023-10-01 PROBLEM — N18.30 CKD (CHRONIC KIDNEY DISEASE), STAGE III: Status: ACTIVE | Noted: 2023-10-01

## 2023-10-01 PROBLEM — F02.80 ALZHEIMER'S DEMENTIA: Status: ACTIVE | Noted: 2023-10-01

## 2023-10-01 PROBLEM — F02.80 ALZHEIMER'S DEMENTIA: Status: ACTIVE | Noted: 2023-01-01

## 2023-10-01 LAB
ALBUMIN SERPL-MCNC: 3.4 G/DL (ref 3.5–5.2)
ALBUMIN/GLOB SERPL: 1 G/DL
ALP SERPL-CCNC: 119 U/L (ref 39–117)
ALT SERPL W P-5'-P-CCNC: 53 U/L (ref 1–33)
ANION GAP SERPL CALCULATED.3IONS-SCNC: 13 MMOL/L (ref 5–15)
AST SERPL-CCNC: 37 U/L (ref 1–32)
B PARAPERT DNA SPEC QL NAA+PROBE: NOT DETECTED
B PERT DNA SPEC QL NAA+PROBE: NOT DETECTED
BASOPHILS # BLD AUTO: 0.02 10*3/MM3 (ref 0–0.2)
BASOPHILS NFR BLD AUTO: 0.2 % (ref 0–1.5)
BILIRUB SERPL-MCNC: 0.5 MG/DL (ref 0–1.2)
BUN SERPL-MCNC: 38 MG/DL (ref 8–23)
BUN/CREAT SERPL: 24.5 (ref 7–25)
C PNEUM DNA NPH QL NAA+NON-PROBE: NOT DETECTED
CALCIUM SPEC-SCNC: 9.2 MG/DL (ref 8.2–9.6)
CHLORIDE SERPL-SCNC: 104 MMOL/L (ref 98–107)
CO2 SERPL-SCNC: 29 MMOL/L (ref 22–29)
CREAT SERPL-MCNC: 1.55 MG/DL (ref 0.57–1)
D-LACTATE SERPL-SCNC: 1.8 MMOL/L (ref 0.5–2)
DEPRECATED RDW RBC AUTO: 46 FL (ref 37–54)
EGFRCR SERPLBLD CKD-EPI 2021: 31.7 ML/MIN/1.73
EOSINOPHIL # BLD AUTO: 0.18 10*3/MM3 (ref 0–0.4)
EOSINOPHIL NFR BLD AUTO: 1.6 % (ref 0.3–6.2)
ERYTHROCYTE [DISTWIDTH] IN BLOOD BY AUTOMATED COUNT: 14.3 % (ref 12.3–15.4)
FLUAV SUBTYP SPEC NAA+PROBE: NOT DETECTED
FLUBV RNA ISLT QL NAA+PROBE: NOT DETECTED
GEN 5 2HR TROPONIN T REFLEX: 93 NG/L
GLOBULIN UR ELPH-MCNC: 3.5 GM/DL
GLUCOSE BLDC GLUCOMTR-MCNC: 189 MG/DL (ref 70–130)
GLUCOSE SERPL-MCNC: 124 MG/DL (ref 65–99)
HADV DNA SPEC NAA+PROBE: NOT DETECTED
HCOV 229E RNA SPEC QL NAA+PROBE: NOT DETECTED
HCOV HKU1 RNA SPEC QL NAA+PROBE: NOT DETECTED
HCOV NL63 RNA SPEC QL NAA+PROBE: NOT DETECTED
HCOV OC43 RNA SPEC QL NAA+PROBE: NOT DETECTED
HCT VFR BLD AUTO: 33 % (ref 34–46.6)
HGB BLD-MCNC: 10.3 G/DL (ref 12–15.9)
HMPV RNA NPH QL NAA+NON-PROBE: NOT DETECTED
HOLD SPECIMEN: NORMAL
HPIV1 RNA ISLT QL NAA+PROBE: NOT DETECTED
HPIV2 RNA SPEC QL NAA+PROBE: NOT DETECTED
HPIV3 RNA NPH QL NAA+PROBE: NOT DETECTED
HPIV4 P GENE NPH QL NAA+PROBE: NOT DETECTED
IMM GRANULOCYTES # BLD AUTO: 0.07 10*3/MM3 (ref 0–0.05)
IMM GRANULOCYTES NFR BLD AUTO: 0.6 % (ref 0–0.5)
LYMPHOCYTES # BLD AUTO: 1.18 10*3/MM3 (ref 0.7–3.1)
LYMPHOCYTES NFR BLD AUTO: 10.3 % (ref 19.6–45.3)
M PNEUMO IGG SER IA-ACNC: NOT DETECTED
MAGNESIUM SERPL-MCNC: 2.1 MG/DL (ref 1.6–2.4)
MCH RBC QN AUTO: 27.5 PG (ref 26.6–33)
MCHC RBC AUTO-ENTMCNC: 31.2 G/DL (ref 31.5–35.7)
MCV RBC AUTO: 88 FL (ref 79–97)
MONOCYTES # BLD AUTO: 0.73 10*3/MM3 (ref 0.1–0.9)
MONOCYTES NFR BLD AUTO: 6.4 % (ref 5–12)
NEUTROPHILS NFR BLD AUTO: 80.9 % (ref 42.7–76)
NEUTROPHILS NFR BLD AUTO: 9.28 10*3/MM3 (ref 1.7–7)
NRBC BLD AUTO-RTO: 0 /100 WBC (ref 0–0.2)
NT-PROBNP SERPL-MCNC: ABNORMAL PG/ML (ref 0–1800)
PLATELET # BLD AUTO: 412 10*3/MM3 (ref 140–450)
PMV BLD AUTO: 10.1 FL (ref 6–12)
POTASSIUM SERPL-SCNC: 3.7 MMOL/L (ref 3.5–5.2)
PROCALCITONIN SERPL-MCNC: 0.1 NG/ML (ref 0–0.25)
PROT SERPL-MCNC: 6.9 G/DL (ref 6–8.5)
RBC # BLD AUTO: 3.75 10*6/MM3 (ref 3.77–5.28)
RHINOVIRUS RNA SPEC NAA+PROBE: NOT DETECTED
RSV RNA NPH QL NAA+NON-PROBE: NOT DETECTED
SARS-COV-2 RNA NPH QL NAA+NON-PROBE: NOT DETECTED
SODIUM SERPL-SCNC: 146 MMOL/L (ref 136–145)
TROPONIN T DELTA: -19 NG/L
TROPONIN T SERPL HS-MCNC: 112 NG/L
TSH SERPL DL<=0.05 MIU/L-ACNC: 0.93 UIU/ML (ref 0.27–4.2)
WBC NRBC COR # BLD: 11.46 10*3/MM3 (ref 3.4–10.8)
WHOLE BLOOD HOLD COAG: NORMAL
WHOLE BLOOD HOLD SPECIMEN: NORMAL

## 2023-10-01 PROCEDURE — 82948 REAGENT STRIP/BLOOD GLUCOSE: CPT

## 2023-10-01 PROCEDURE — 80053 COMPREHEN METABOLIC PANEL: CPT | Performed by: EMERGENCY MEDICINE

## 2023-10-01 PROCEDURE — 93010 ELECTROCARDIOGRAM REPORT: CPT | Performed by: INTERNAL MEDICINE

## 2023-10-01 PROCEDURE — 83880 ASSAY OF NATRIURETIC PEPTIDE: CPT | Performed by: EMERGENCY MEDICINE

## 2023-10-01 PROCEDURE — 93005 ELECTROCARDIOGRAM TRACING: CPT | Performed by: EMERGENCY MEDICINE

## 2023-10-01 PROCEDURE — 85025 COMPLETE CBC W/AUTO DIFF WBC: CPT | Performed by: EMERGENCY MEDICINE

## 2023-10-01 PROCEDURE — 84484 ASSAY OF TROPONIN QUANT: CPT | Performed by: EMERGENCY MEDICINE

## 2023-10-01 PROCEDURE — 83735 ASSAY OF MAGNESIUM: CPT | Performed by: EMERGENCY MEDICINE

## 2023-10-01 PROCEDURE — 36415 COLL VENOUS BLD VENIPUNCTURE: CPT

## 2023-10-01 PROCEDURE — 25010000002 FUROSEMIDE PER 20 MG: Performed by: EMERGENCY MEDICINE

## 2023-10-01 PROCEDURE — 99285 EMERGENCY DEPT VISIT HI MDM: CPT

## 2023-10-01 PROCEDURE — 84443 ASSAY THYROID STIM HORMONE: CPT | Performed by: EMERGENCY MEDICINE

## 2023-10-01 PROCEDURE — 83605 ASSAY OF LACTIC ACID: CPT | Performed by: EMERGENCY MEDICINE

## 2023-10-01 PROCEDURE — 84145 PROCALCITONIN (PCT): CPT | Performed by: EMERGENCY MEDICINE

## 2023-10-01 PROCEDURE — 0202U NFCT DS 22 TRGT SARS-COV-2: CPT | Performed by: EMERGENCY MEDICINE

## 2023-10-01 PROCEDURE — 71045 X-RAY EXAM CHEST 1 VIEW: CPT

## 2023-10-01 PROCEDURE — 25010000002 AMIODARONE IN DEXTROSE 5% 360-4.14 MG/200ML-% SOLUTION: Performed by: PEDIATRICS

## 2023-10-01 PROCEDURE — 87040 BLOOD CULTURE FOR BACTERIA: CPT | Performed by: EMERGENCY MEDICINE

## 2023-10-01 PROCEDURE — 93005 ELECTROCARDIOGRAM TRACING: CPT | Performed by: PEDIATRICS

## 2023-10-01 RX ORDER — AMOXICILLIN 250 MG
1 CAPSULE ORAL DAILY
Status: DISCONTINUED | OUTPATIENT
Start: 2023-10-02 | End: 2023-10-06 | Stop reason: HOSPADM

## 2023-10-01 RX ORDER — NITROGLYCERIN 0.4 MG/1
0.4 TABLET SUBLINGUAL
Status: DISCONTINUED | OUTPATIENT
Start: 2023-10-01 | End: 2023-10-06 | Stop reason: HOSPADM

## 2023-10-01 RX ORDER — SODIUM CHLORIDE 0.9 % (FLUSH) 0.9 %
10 SYRINGE (ML) INJECTION EVERY 12 HOURS SCHEDULED
Status: DISCONTINUED | OUTPATIENT
Start: 2023-10-01 | End: 2023-10-06 | Stop reason: HOSPADM

## 2023-10-01 RX ORDER — DOXYCYCLINE HYCLATE 100 MG
100 TABLET ORAL 2 TIMES DAILY
COMMUNITY
End: 2023-10-06 | Stop reason: HOSPADM

## 2023-10-01 RX ORDER — ACETAMINOPHEN 325 MG/1
650 TABLET ORAL EVERY 4 HOURS PRN
Status: DISCONTINUED | OUTPATIENT
Start: 2023-10-01 | End: 2023-10-06 | Stop reason: HOSPADM

## 2023-10-01 RX ORDER — CALCIUM CARBONATE 500 MG/1
2 TABLET, CHEWABLE ORAL 3 TIMES DAILY PRN
Status: DISCONTINUED | OUTPATIENT
Start: 2023-10-01 | End: 2023-10-06 | Stop reason: HOSPADM

## 2023-10-01 RX ORDER — METOLAZONE 2.5 MG/1
2.5 TABLET ORAL 2 TIMES DAILY
COMMUNITY
End: 2023-10-06 | Stop reason: HOSPADM

## 2023-10-01 RX ORDER — PROCHLORPERAZINE MALEATE 5 MG/1
5 TABLET ORAL EVERY 6 HOURS PRN
Status: DISCONTINUED | OUTPATIENT
Start: 2023-10-01 | End: 2023-10-06 | Stop reason: HOSPADM

## 2023-10-01 RX ORDER — CEFUROXIME AXETIL 250 MG/1
250 TABLET ORAL EVERY 12 HOURS SCHEDULED
Status: COMPLETED | OUTPATIENT
Start: 2023-10-01 | End: 2023-10-05

## 2023-10-01 RX ORDER — AMOXICILLIN 250 MG
1 CAPSULE ORAL DAILY
COMMUNITY

## 2023-10-01 RX ORDER — FAMOTIDINE 20 MG/1
20 TABLET, FILM COATED ORAL 2 TIMES DAILY PRN
Status: DISCONTINUED | OUTPATIENT
Start: 2023-10-01 | End: 2023-10-06 | Stop reason: HOSPADM

## 2023-10-01 RX ORDER — SODIUM CHLORIDE 0.9 % (FLUSH) 0.9 %
10 SYRINGE (ML) INJECTION AS NEEDED
Status: DISCONTINUED | OUTPATIENT
Start: 2023-10-01 | End: 2023-10-06 | Stop reason: HOSPADM

## 2023-10-01 RX ORDER — DILTIAZEM HYDROCHLORIDE 5 MG/ML
5 INJECTION INTRAVENOUS ONCE
Status: COMPLETED | OUTPATIENT
Start: 2023-10-01 | End: 2023-10-01

## 2023-10-01 RX ORDER — FUROSEMIDE 10 MG/ML
40 INJECTION INTRAMUSCULAR; INTRAVENOUS ONCE
Status: COMPLETED | OUTPATIENT
Start: 2023-10-01 | End: 2023-10-01

## 2023-10-01 RX ORDER — METOPROLOL TARTRATE 50 MG/1
50 TABLET, FILM COATED ORAL EVERY 12 HOURS SCHEDULED
Status: DISCONTINUED | OUTPATIENT
Start: 2023-10-01 | End: 2023-10-03

## 2023-10-01 RX ORDER — ACETAMINOPHEN 500 MG
500 TABLET ORAL EVERY 6 HOURS PRN
COMMUNITY

## 2023-10-01 RX ORDER — CHOLECALCIFEROL (VITAMIN D3) 125 MCG
5 CAPSULE ORAL NIGHTLY
Status: DISCONTINUED | OUTPATIENT
Start: 2023-10-01 | End: 2023-10-06 | Stop reason: HOSPADM

## 2023-10-01 RX ORDER — PROCHLORPERAZINE EDISYLATE 5 MG/ML
5 INJECTION INTRAMUSCULAR; INTRAVENOUS EVERY 6 HOURS PRN
Status: DISCONTINUED | OUTPATIENT
Start: 2023-10-01 | End: 2023-10-06 | Stop reason: HOSPADM

## 2023-10-01 RX ORDER — POTASSIUM CHLORIDE 20 MEQ/1
20 TABLET, EXTENDED RELEASE ORAL
COMMUNITY
End: 2023-10-06 | Stop reason: HOSPADM

## 2023-10-01 RX ORDER — DOXYCYCLINE 100 MG/1
100 CAPSULE ORAL EVERY 12 HOURS SCHEDULED
Status: COMPLETED | OUTPATIENT
Start: 2023-10-01 | End: 2023-10-05

## 2023-10-01 RX ORDER — SODIUM CHLORIDE 9 MG/ML
40 INJECTION, SOLUTION INTRAVENOUS AS NEEDED
Status: DISCONTINUED | OUTPATIENT
Start: 2023-10-01 | End: 2023-10-06 | Stop reason: HOSPADM

## 2023-10-01 RX ADMIN — Medication 5 MG: at 20:17

## 2023-10-01 RX ADMIN — FUROSEMIDE 40 MG: 10 INJECTION, SOLUTION INTRAMUSCULAR; INTRAVENOUS at 15:44

## 2023-10-01 RX ADMIN — METOPROLOL TARTRATE 50 MG: 50 TABLET ORAL at 17:55

## 2023-10-01 RX ADMIN — CEFUROXIME AXETIL 250 MG: 250 TABLET, FILM COATED ORAL at 20:16

## 2023-10-01 RX ADMIN — AMIODARONE HYDROCHLORIDE 1 MG/MIN: 1.8 INJECTION, SOLUTION INTRAVENOUS at 22:34

## 2023-10-01 RX ADMIN — DILTIAZEM HYDROCHLORIDE 5 MG/HR: 5 INJECTION INTRAVENOUS at 14:02

## 2023-10-01 RX ADMIN — DILTIAZEM HYDROCHLORIDE 5 MG: 5 INJECTION INTRAVENOUS at 14:02

## 2023-10-01 RX ADMIN — APIXABAN 2.5 MG: 2.5 TABLET, FILM COATED ORAL at 20:17

## 2023-10-01 RX ADMIN — DOXYCYCLINE 100 MG: 100 CAPSULE ORAL at 20:17

## 2023-10-01 NOTE — H&P
[facility] Medicine Services  HISTORY AND PHYSICAL    Patient Name: Amanda Glover  : 10/31/1932  MRN: [id number]  Primary Care Physician: Denis Lr MD  Date of admission: 10/1/2023    Subjective     Chief Complaint: dyspnea / lethargy     HPI:  Amanda Glover is a 89 y.o. female with PMH significant for HTN, HLD, PAF (Eliquis), mild aortic stenosis, CKD III, prior DVT ( following hip fx) and Alzheimer's dementia. She resides at [facility]. At baseline, she is ambulatory with a walker and able to feed / dress herself. She does have assistance with bathing. Per daughters, she is able to recognize familiar faces.     She was brought to [facility] ED on 10/1/23 for evaluation of a 2-3 day history of dyspnea and lethargy. It appears nursing home started her on Doxycycline 100mg BID and Metolazone 2.5mg BID on . Daughters also report she received an IV push of a diuretic at the nursing home as well.     In the ED, she was found to be in atrial fibrillation with RVR (HR as high as 150's). O2 88% on room air and she was placed on 3L NC. CXR showed right-sided groundglass opacities. Labs notable for HS Troponin T 112 --> 93, proBNP 19,889. Na 146, creatinine 1.55, AST 37, ALT 53, alk phos 119, WBCs 11.46, lactic acid 1.8, procalcitonon 0.10. Respiratory PCR negative. She was placed on Diltiazem gtt and given Lasix 40mg IV x 1 dose. Hospital medicine asked to admit.     At time of my exam, patient was resting comfortably in bed with no complaints. Daughters report that patient has been more lethargic and needing more assistance over the preceding few days. They do not feel that her legs are more swollen than baseline. POA (Ann Marie Dowling) confirmed DNR/DNI code status.     Review of Systems   Constitutional: Negative.    HENT: Negative.     Respiratory: Negative.     Cardiovascular: Negative.    Gastrointestinal: Negative.    Genitourinary: Negative.     Musculoskeletal: Negative.    Neurological: Negative.    Psychiatric/Behavioral: Negative.        Personal History     Past Medical History:   Diagnosis Date    Aortic valve stenosis     Atrial fibrillation     Deep vein thrombosis     Dementia     Hyperlipidemia     Hypertension     Murmur     Osteoporosis      Past Surgical History:   Procedure Laterality Date    APPENDECTOMY      FEMUR FRACTURE SURGERY Left 01/2020    HERNIA REPAIR      HYSTERECTOMY       Family History: family history includes Dementia in her father.     Social History:  reports that she has never smoked. She has never used smokeless tobacco. She reports that she does not drink alcohol and does not use drugs.    Social History     Social History Narrative    Not on file     Medications:  Cholecalciferol, Senna, apixaban, benazepril, calcium carbonate, furosemide, hydrALAZINE, metoprolol tartrate, multivitamin with minerals, and vitamin E    Allergies   Allergen Reactions    Influenza Vaccine Live Other (See Comments)     Patient unsure     Objective     Vital Signs:   Temp:  [97.7 °F (36.5 °C)] 97.7 °F (36.5 °C)  Heart Rate:  [] 137  Resp:  [18] 18  BP: (116-140)/() 122/92  Flow (L/min):  [3] 3    Physical Exam   Constitutional: No acute distress. Awake, alert and conversational. Sitting upright in bed   Eyes: PERRLA, sclerae anicteric, no conjunctival injection  HENT: NCAT, mucous membranes moist  Neck: Supple, no thyromegaly, no lymphadenopathy, trachea midline  Respiratory: Clear to auscultation bilaterally, nonlabored respirations, on 3L NC   Cardiovascular: IRR - rate 110-130's, (+) systolic murmur, palpable pedal pulses bilaterally  Gastrointestinal: Positive bowel sounds, soft, nontender, nondistended  Musculoskeletal: Trace bilateral ankle edema, no clubbing or cyanosis to extremities  Psychiatric: Appropriate affect, cooperative  Neurologic: Pleasantly confused, moves all extremities spontaneously without focal deficits,  speech clear and coherent   Skin: No rashes    Result Review:  I have personally reviewed the results from the time of this admission to 10/1/2023 16:05 EDT and agree with these findings:  [x]  Laboratory list / accordion  []  Microbiology  [x]  Radiology  [x]  EKG/Telemetry   [x]  Cardiology/Vascular   []  Pathology  [x]  Old records    LAB RESULTS:      Lab 10/01/23  1307   WBC 11.46*   HEMOGLOBIN 10.3*   HEMATOCRIT 33.0*   PLATELETS 412   NEUTROS ABS 9.28*   IMMATURE GRANS (ABS) 0.07*   LYMPHS ABS 1.18   MONOS ABS 0.73   EOS ABS 0.18   MCV 88.0   PROCALCITONIN 0.10   LACTATE 1.8         Lab 10/01/23  1307   SODIUM 146*   POTASSIUM 3.7   CHLORIDE 104   CO2 29.0   ANION GAP 13.0   BUN 38*   CREATININE 1.55*   EGFR 31.7*   GLUCOSE 124*   CALCIUM 9.2   MAGNESIUM 2.1   TSH 0.927         Lab 10/01/23  1307   TOTAL PROTEIN 6.9   ALBUMIN 3.4*   GLOBULIN 3.5   ALT (SGPT) 53*   AST (SGOT) 37*   BILIRUBIN 0.5   ALK PHOS 119*         Lab 10/01/23  1456 10/01/23  1307   PROBNP  --  19,889.0*   HSTROP T 93* 112*     Brief Urine Lab Results       None          Microbiology Results (last 10 days)       Procedure Component Value - Date/Time    COVID PRE-OP / PRE-PROCEDURE SCREENING ORDER (NO ISOLATION) - Swab, Nasopharynx [[id number]]  (Normal) Collected: 10/01/23 1457    Lab Status: Final result Specimen: Swab from Nasopharynx Updated: 10/01/23 0472    Narrative:      The following orders were created for panel order COVID PRE-OP / PRE-PROCEDURE SCREENING ORDER (NO ISOLATION) - Swab, Nasopharynx.  Procedure                               Abnormality         Status                     ---------                               -----------         ------                     Respiratory Panel PCR w/...[[id number]]  Normal              Final result                 Please view results for these tests on the individual orders.    Respiratory Panel PCR w/COVID-19(SARS-CoV-2) PHI/PERFECTO/[place]/PAD/COR/MAD/NIRU In-House, NP Swab in UTM/VTM, 3-4 HR  TAT - Swab, Nasopharynx [[id number]]  (Normal) Collected: 10/01/23 1454    Lab Status: Final result Specimen: Swab from Nasopharynx Updated: 10/01/23 4760     ADENOVIRUS, PCR Not Detected     Coronavirus 229E Not Detected     Coronavirus HKU1 Not Detected     Coronavirus NL63 Not Detected     Coronavirus OC43 Not Detected     COVID19 Not Detected     Human Metapneumovirus Not Detected     Human Rhinovirus/Enterovirus Not Detected     Influenza A PCR Not Detected     Influenza B PCR Not Detected     Parainfluenza Virus 1 Not Detected     Parainfluenza Virus 2 Not Detected     Parainfluenza Virus 3 Not Detected     Parainfluenza Virus 4 Not Detected     RSV, PCR Not Detected     Bordetella pertussis pcr Not Detected     Bordetella parapertussis PCR Not Detected     Chlamydophila pneumoniae PCR Not Detected     Mycoplasma pneumo by PCR Not Detected    Narrative:      In the setting of a positive respiratory panel with a viral infection PLUS a negative procalcitonin without other underlying concern for bacterial infection, consider observing off antibiotics or discontinuation of antibiotics and continue supportive care. If the respiratory panel is positive for atypical bacterial infection (Bordetella pertussis, Chlamydophila pneumoniae, or Mycoplasma pneumoniae), consider antibiotic de-escalation to target atypical bacterial infection.          XR Chest 1 View    Result Date: 10/1/2023  XR CHEST 1 VW Date of Exam: 10/1/2023 1:10 PM EDT Indication: Dysrhythmia triage protocol Comparison: Chest x-ray 3/16/2020 Findings: There is mild cardiomegaly. There are groundglass opacities throughout the right lung, worse in the right upper lobe. No significant pleural effusion. No pneumothorax. No acute osseous findings.     Impression: Impression: Groundglass opacities throughout the right lung compatible with infection. Electronically Signed: Silas Looney MD  10/1/2023 1:55 PM EDT  Workstation ID: [id number]     Results for  orders placed during the hospital encounter of 03/16/20    Adult Transthoracic Echo Complete W/ Cont if Necessary Per Protocol    Interpretation Summary  · Left ventricular systolic function is normal. Estimated EF variable secondary to underlying atrial fibrillation appears to be in the range of 56 - 60%  · All left ventricular wall segments contract normally.  · There is moderate calcification of the aortic valve.Mild aortic valve regurgitation is present. Mild aortic valve stenosis is present  · Aortic valve mean pressure gradient is 9.0 mmHg. Aortic valve area is 1.4 cm2. Aortic valve dimensionless index is 0.4.  · Mild MAC is present. Mild mitral valve regurgitation is present  · Mild to moderate tricuspid valve regurgitation is present. Estimated right ventricular systolic pressure from tricuspid regurgitation is normal (<35 mmHg).    Assessment & Plan       Hypertension    Aortic valve stenosis, mild    Atrial fibrillation with RVR    Elevated troponin    History of DVT (deep vein thrombosis) - 2020    Alzheimer's dementia    CKD (chronic kidney disease), stage III    Elevated serum creatinine    Elevated transaminase level    Community acquired pneumonia of right lung    Hypoxia    Amanda Glover is a 89 y.o. female with PMH significant for HTN, HLD, PAF (Eliquis), mild aortic stenosis, CKD III, prior DVT (2020 following hip fx) and Alzheimer's dementia. She resides at [facility]. At baseline, she is ambulatory with a walker and able to feed / dress herself. She does have assistance with bathing. Per daughters, she is able to recognize familiar faces. Admitted to [facility] ED 10/1/23 for atrial fibrillation with RVR    Atrial fibrillation with RVR  Chronic anticoagulation  - Started on Diltiazem gtt in ED - continue  - Start Metoprolol 50mg PO BID  - Continue Eliquis 2.5mg BID   - Consult cardiology - previously followed with Dr. Chand  - Similar admission in 2020. ED discussed  cardioversion with family - they are interested in medical management of afib for now, do not desire ECV at this time   - TSH WNL     Elevated troponin  - EKG reassuring. Troponin trending down 112 --> 93  - Likely demand ischemia in the setting of RVR / hypoxia     Elevated proBNP / concern for volume overload  History of aortic stenosis   Elevated transaminases  - proBNP 19,889  - Chronically on Lasix 40mg PO daily  - Received Metolazone 2.5mg daily on 9/29 and 9/30 - daughters also report IV push of diuretic at NH but not clear which drug  - s/p Lasix 40mg IV x 1 dose in ED  - AST / ALT mildly elevated - suspect congestive hepatopathy  - Check ECHO   - AM BMP before further diuresis    Hypernatremia  - May be related to diuresis. Repeat BMP in AM and monitor closely     Right lobe pneumonia  Hypoxia  - CXR concerning for right-sided pneumonia. WBCs mildly elevated at 11.46 but procalcitonin normal  - Started on Doxycycline on 9/29 - will continue to complete total 5 days. Add Ceftin for CAP coverage     Elevated creatinine  History of CKD III  - Baseline creatinine not entirely clear, was 1.0-1.1 in 2020 but no labs since then  - Creatinine 1.55 today   - AM BMP after diuresis     Hypertension  - Metoprolol increased  - Hold Hydralazine / Benazepril for now as rate control is achieved     Prior DVT, continue Eliquis     DVT prophylaxis: Eliquis     CODE STATUS:    Medical Intervention Limits: NO intubation (DNI)  Level Of Support Discussed With: Health Care Surrogate  Code Status (Patient has no pulse and is not breathing): No CPR (Do Not Attempt to Resuscitate)  Medical Interventions (Patient has pulse or is breathing): Limited Support    Expected Discharge Expected Discharge Date: 10/4/2023; Expected Discharge Time:     This note has been completed as part of a split-shared workflow.     Electronically signed by Roula Chapman PA-C, 10/01/23, 4:32 PM EDT.              Attending   Admission Attestation        I have performed an independent face-to-face diagnostic evaluation including performing an independent physical examination.  The documented plan of care above was reviewed and developed with the advanced practice clinician (APC).  I have updated the HPI as appropriate.    Brief HPI    89y/o F with hx of HTN, PAF, mild aortic stenosis presents with inc SOB and lethary.  Pt at a [facility] and they started her on metolazone and doxycycline.  Sx didn't improve and found to have an elevated troponin, so sent her here.  Pt found to be in Afib with RVR with elevated BNP.  Started on dilt gtt and given some lasix.      Attending Physical Exam:  Temp:  [97.7 °F (36.5 °C)] 97.7 °F (36.5 °C)  Heart Rate:  [] 130  Resp:  [18] 18  BP: (116-140)/() 119/109  Flow (L/min):  [3] 3    Constitutional: No acute distress, awake, alert  HENT: NCAT, mucous membranes moist  Respiratory: Crackles bilaterally, respiratory effort normal   Cardiovascular: Tachycardic, irreg, no murmurs, rubs, or gallops  Gastrointestinal: Positive bowel sounds, soft, nontender, nondistended  Musculoskeletal: 1+ bilateral ankle edema  Psychiatric: Appropriate affect, cooperative  Neurologic: Oriented x 1, strength symmetric in all extremities, Cranial Nerves grossly intact to confrontation, speech clear  Skin: No rashes       Assessment and Plan:    See assessment and plan documented by APC above and updated/edited by me as appropriate.      Overnight patient developed hypotension despite being weaned off the dilt gtt.  HR not able to be controlled.  Overall wasn't having any acute symptoms.  Called cardiology and recommended amio gtt without bolus.  Discussed with daughter at length the different options, including the amio gtt, and possible cardioversion.  She is not interested in cardioversion at this time.  She would like to try the medications.  She is not interested in much escalation of care.  Will have cardiology see in  [place].    Sandrita García MD  10/01/23

## 2023-10-01 NOTE — ED PROVIDER NOTES
Subjective   History of Present Illness  Patient is a pleasant 89-year-old female who resides at [facility] presents with generalized weakness and shortness of breath for the past 3 to 4 days.  Daughter is with her and supplies the Jorde of the history.  Reported that due to this fatigue and shortness of breath that she had labs drawn yesterday and today it was noted that her troponin was elevated she was sent to the emergency department for evaluation.  Patient denies any chest pain and family states that she has not noted any significant complaints over the past few days but they have been able to visually see that she has been breathing heavier than usual when ambulating and been more tired than usual.  Denies fever, chills, chest pain, abdominal pain, vomiting, diarrhea, or other acute complaints.  She does have a history of paroxysmal atrial fibrillation with the last noted episodes in 2020.  At that time she was put on rate control medication and spontaneously converted back to normal sinus rhythm.    Review of Systems   All other systems reviewed and are negative.    Past Medical History:   Diagnosis Date    Aortic valve stenosis     Atrial fibrillation     Deep vein thrombosis     Dementia     Hyperlipidemia     Hypertension     Murmur     Osteoporosis        Allergies   Allergen Reactions    Influenza Vaccine Live Other (See Comments)     Patient unsure       Past Surgical History:   Procedure Laterality Date    APPENDECTOMY      FEMUR FRACTURE SURGERY Left 01/2020    HERNIA REPAIR      HYSTERECTOMY         Family History   Problem Relation Age of Onset    Dementia Father        Social History     Socioeconomic History    Marital status:    Tobacco Use    Smoking status: Never    Smokeless tobacco: Never   Vaping Use    Vaping Use: Never used   Substance and Sexual Activity    Alcohol use: Never    Drug use: Never    Sexual activity: Defer           Objective   Physical Exam  Vitals and  nursing note reviewed.   Constitutional:       Appearance: She is ill-appearing (Chronically ill appearance, consistent with age).   HENT:      Head: Normocephalic and atraumatic.   Eyes:      Extraocular Movements: Extraocular movements intact.      Conjunctiva/sclera: Conjunctivae normal.   Cardiovascular:      Rate and Rhythm: Tachycardia present. Rhythm irregular.      Pulses: Normal pulses.      Heart sounds: Normal heart sounds. No murmur heard.  Pulmonary:      Effort: Pulmonary effort is normal. No respiratory distress.      Breath sounds: Normal breath sounds. No wheezing or rhonchi.   Abdominal:      General: Bowel sounds are normal. There is no distension.      Palpations: Abdomen is soft.      Tenderness: There is no abdominal tenderness. There is no guarding or rebound.   Musculoskeletal:         General: No swelling, deformity or signs of injury. Normal range of motion.      Cervical back: Normal range of motion.   Skin:     General: Skin is warm.      Capillary Refill: Capillary refill takes less than 2 seconds.   Neurological:      General: No focal deficit present.      Mental Status: She is alert and oriented to person, place, and time. Mental status is at baseline.   Psychiatric:         Behavior: Behavior normal.         Thought Content: Thought content normal.       Critical Care  Performed by: Beny Arredondo DO  Authorized by: Beny Arredondo DO     Critical care provider statement:     Critical care time (minutes):  35    Critical care time was exclusive of:  Separately billable procedures and treating other patients    Critical care was necessary to treat or prevent imminent or life-threatening deterioration of the following conditions:  Circulatory failure    Critical care was time spent personally by me on the following activities:  Ordering and performing treatments and interventions, ordering and review of laboratory studies, ordering and review of radiographic studies, pulse oximetry,  re-evaluation of patient's condition, review of old charts, obtaining history from patient or surrogate, examination of patient, evaluation of patient's response to treatment, discussions with consultants and development of treatment plan with patient or surrogate    I assumed direction of critical care for this patient from another provider in my specialty: no      Care discussed with: admitting provider             ED Course  ED Course as of 10/02/23 0354   Sun Oct 01, 2023   3809 As patient has historically improved with rate control, both daughter prefer to avoid electrical cardioversion, and there is some risk to sedation cardioversion given her age family prefers to avoid electrical cardioversion and opts for medication and rate control.  With her elevated troponin, dyspnea she will likely require admission to the hospital regardless.  Diltiazem has been ordered and labs are currently pending. [CP]      ED Course User Index  [CP] Beny Arredondo DO      Recent Results (from the past 24 hour(s))   Comprehensive Metabolic Panel    Collection Time: 10/01/23  1:07 PM    Specimen: Blood   Result Value Ref Range    Glucose 124 (H) 65 - 99 mg/dL    BUN 38 (H) 8 - 23 mg/dL    Creatinine 1.55 (H) 0.57 - 1.00 mg/dL    Sodium 146 (H) 136 - 145 mmol/L    Potassium 3.7 3.5 - 5.2 mmol/L    Chloride 104 98 - 107 mmol/L    CO2 29.0 22.0 - 29.0 mmol/L    Calcium 9.2 8.2 - 9.6 mg/dL    Total Protein 6.9 6.0 - 8.5 g/dL    Albumin 3.4 (L) 3.5 - 5.2 g/dL    ALT (SGPT) 53 (H) 1 - 33 U/L    AST (SGOT) 37 (H) 1 - 32 U/L    Alkaline Phosphatase 119 (H) 39 - 117 U/L    Total Bilirubin 0.5 0.0 - 1.2 mg/dL    Globulin 3.5 gm/dL    A/G Ratio 1.0 g/dL    BUN/Creatinine Ratio 24.5 7.0 - 25.0    Anion Gap 13.0 5.0 - 15.0 mmol/L    eGFR 31.7 (L) >60.0 mL/min/1.73   Magnesium    Collection Time: 10/01/23  1:07 PM    Specimen: Blood   Result Value Ref Range    Magnesium 2.1 1.6 - 2.4 mg/dL   Single High Sensitivity Troponin T    Collection Time:  10/01/23  1:07 PM    Specimen: Blood   Result Value Ref Range    HS Troponin T 112 (C) <10 ng/L   TSH    Collection Time: 10/01/23  1:07 PM    Specimen: Blood   Result Value Ref Range    TSH 0.927 0.270 - 4.200 uIU/mL   BNP    Collection Time: 10/01/23  1:07 PM    Specimen: Blood   Result Value Ref Range    proBNP 19,889.0 (H) 0.0 - 1,800.0 pg/mL   Green Top (Gel)    Collection Time: 10/01/23  1:07 PM   Result Value Ref Range    Extra Tube Hold for add-ons.    Lavender Top    Collection Time: 10/01/23  1:07 PM   Result Value Ref Range    Extra Tube hold for add-on    Gold Top - SST    Collection Time: 10/01/23  1:07 PM   Result Value Ref Range    Extra Tube Hold for add-ons.    Gray Top    Collection Time: 10/01/23  1:07 PM   Result Value Ref Range    Extra Tube Hold for add-ons.    Light Blue Top    Collection Time: 10/01/23  1:07 PM   Result Value Ref Range    Extra Tube Hold for add-ons.    CBC Auto Differential    Collection Time: 10/01/23  1:07 PM    Specimen: Blood   Result Value Ref Range    WBC 11.46 (H) 3.40 - 10.80 10*3/mm3    RBC 3.75 (L) 3.77 - 5.28 10*6/mm3    Hemoglobin 10.3 (L) 12.0 - 15.9 g/dL    Hematocrit 33.0 (L) 34.0 - 46.6 %    MCV 88.0 79.0 - 97.0 fL    MCH 27.5 26.6 - 33.0 pg    MCHC 31.2 (L) 31.5 - 35.7 g/dL    RDW 14.3 12.3 - 15.4 %    RDW-SD 46.0 37.0 - 54.0 fl    MPV 10.1 6.0 - 12.0 fL    Platelets 412 140 - 450 10*3/mm3    Neutrophil % 80.9 (H) 42.7 - 76.0 %    Lymphocyte % 10.3 (L) 19.6 - 45.3 %    Monocyte % 6.4 5.0 - 12.0 %    Eosinophil % 1.6 0.3 - 6.2 %    Basophil % 0.2 0.0 - 1.5 %    Immature Grans % 0.6 (H) 0.0 - 0.5 %    Neutrophils, Absolute 9.28 (H) 1.70 - 7.00 10*3/mm3    Lymphocytes, Absolute 1.18 0.70 - 3.10 10*3/mm3    Monocytes, Absolute 0.73 0.10 - 0.90 10*3/mm3    Eosinophils, Absolute 0.18 0.00 - 0.40 10*3/mm3    Basophils, Absolute 0.02 0.00 - 0.20 10*3/mm3    Immature Grans, Absolute 0.07 (H) 0.00 - 0.05 10*3/mm3    nRBC 0.0 0.0 - 0.2 /100 WBC   Lactic Acid, Plasma     Collection Time: 10/01/23  1:07 PM    Specimen: Blood   Result Value Ref Range    Lactate 1.8 0.5 - 2.0 mmol/L   Procalcitonin    Collection Time: 10/01/23  1:07 PM    Specimen: Blood   Result Value Ref Range    Procalcitonin 0.10 0.00 - 0.25 ng/mL   ECG 12 Lead ED Triage Standing Order; Dysrhythmia    Collection Time: 10/01/23  1:09 PM   Result Value Ref Range    QT Interval 290 ms    QTC Interval 461 ms   High Sensitivity Troponin T 2Hr    Collection Time: 10/01/23  2:56 PM    Specimen: Blood   Result Value Ref Range    HS Troponin T 93 (C) <10 ng/L    Troponin T Delta -19 (L) >=-4 - <+4 ng/L   Respiratory Panel PCR w/COVID-19(SARS-CoV-2) PHI/PERFECTO/[place]/PAD/[facility] In-House, NP Swab in UTM/VTM, 3-4 HR TAT - Swab, Nasopharynx    Collection Time: 10/01/23  2:57 PM    Specimen: Nasopharynx; Swab   Result Value Ref Range    ADENOVIRUS, PCR Not Detected Not Detected    Coronavirus 229E Not Detected Not Detected    Coronavirus HKU1 Not Detected Not Detected    Coronavirus NL63 Not Detected Not Detected    Coronavirus OC43 Not Detected Not Detected    COVID19 Not Detected Not Detected - Ref. Range    Human Metapneumovirus Not Detected Not Detected    Human Rhinovirus/Enterovirus Not Detected Not Detected    Influenza A PCR Not Detected Not Detected    Influenza B PCR Not Detected Not Detected    Parainfluenza Virus 1 Not Detected Not Detected    Parainfluenza Virus 2 Not Detected Not Detected    Parainfluenza Virus 3 Not Detected Not Detected    Parainfluenza Virus 4 Not Detected Not Detected    RSV, PCR Not Detected Not Detected    Bordetella pertussis pcr Not Detected Not Detected    Bordetella parapertussis PCR Not Detected Not Detected    Chlamydophila pneumoniae PCR Not Detected Not Detected    Mycoplasma pneumo by PCR Not Detected Not Detected   POC Glucose Once    Collection Time: 10/01/23  9:54 PM    Specimen: Blood   Result Value Ref Range    Glucose 189 (H) 70 - 130 mg/dL   ECG 12 Lead Rhythm Change     Collection Time: 10/01/23  9:55 PM   Result Value Ref Range    QT Interval 332 ms    QTC Interval 484 ms     Note: In addition to lab results from this visit, the labs listed above may include labs taken at another facility or during a different encounter within the last 24 hours. Please correlate lab times with ED admission and discharge times for further clarification of the services performed during this visit.    XR Chest 1 View   Final Result   Impression:   Groundglass opacities throughout the right lung compatible with infection.         Electronically Signed: Silas Looney MD     10/1/2023 1:55 PM EDT     Workstation ID: [id number]        Vitals:    10/01/23 2304 10/02/23 0000 10/02/23 0130 10/02/23 0200   BP: (!) 87/63 91/69 95/63 110/67   BP Location:  Left arm     Patient Position:  Lying     Pulse: (!) 129 (!) 121 (!) 121 (!) 144   Resp:  18     Temp:  97.6 °F (36.4 °C)     TempSrc:  Oral     SpO2: 96% 97% 98% 99%   Weight:       Height:         Medications   sodium chloride 0.9 % flush 10 mL (has no administration in time range)   dilTIAZem (CARDIZEM) 125 mg in sodium chloride 0.9 % 125 mL infusion (0 mg/hr Intravenous Stopped 10/1/23 2102)   metoprolol tartrate (LOPRESSOR) tablet 50 mg (50 mg Oral Given 10/1/23 1755)   apixaban (ELIQUIS) tablet 2.5 mg (2.5 mg Oral Given 10/1/23 2017)   doxycycline (MONODOX) capsule 100 mg (100 mg Oral Given 10/1/23 2017)   cefuroxime (CEFTIN) tablet 250 mg (250 mg Oral Given 10/1/23 2016)   sennosides-docusate (PERICOLACE) 8.6-50 MG per tablet 1 tablet (has no administration in time range)   sodium chloride 0.9 % flush 10 mL (10 mL Intravenous Not Given 10/1/23 2117)   sodium chloride 0.9 % flush 10 mL (has no administration in time range)   sodium chloride 0.9 % infusion 40 mL (has no administration in time range)   nitroglycerin (NITROSTAT) SL tablet 0.4 mg (has no administration in time range)   acetaminophen (TYLENOL) tablet 650 mg (has no administration in time  range)   prochlorperazine (COMPAZINE) injection 5 mg (has no administration in time range)     Or   prochlorperazine (COMPAZINE) tablet 5 mg (has no administration in time range)   melatonin tablet 5 mg (5 mg Oral Given 10/1/23 2017)   calcium carbonate (TUMS) chewable tablet 500 mg (200 mg elemental) (has no administration in time range)   famotidine (PEPCID) tablet 20 mg (has no administration in time range)   amiodarone 360 mg in 200 mL D5W infusion (0 mg/min Intravenous Stopped 10/2/23 0334)     Followed by   amiodarone 360 mg in 200 mL D5W infusion (0.5 mg/min Intravenous New Bag 10/2/23 0334)   dilTIAZem (CARDIZEM) injection 5 mg (5 mg Intravenous Given 10/1/23 1402)   furosemide (LASIX) injection 40 mg (40 mg Intravenous Given 10/1/23 1544)     ECG/EMG Results (last 24 hours)       Procedure Component Value Units Date/Time    ECG 12 Lead ED Triage Standing Order; Dysrhythmia [[id number]] Collected: 10/01/23 1309     Updated: 10/01/23 1312     QT Interval 290 ms      QTC Interval 461 ms     Narrative:      Test Reason : ED Triage Standing Order~  Blood Pressure :   */*   mmHG  Vent. Rate : 152 BPM     Atrial Rate : 178 BPM     P-R Int :   * ms          QRS Dur :  80 ms      QT Int : 290 ms       P-R-T Axes :   *  19 221 degrees     QTc Int : 461 ms    Atrial fibrillation with rapid ventricular response with premature ventricular or aberrantly conducted complexes  ST & T wave abnormality, consider inferolateral ischemia  Abnormal ECG  When compared with ECG of 02-APR-2020 22:19,  Atrial fibrillation has replaced Sinus rhythm  Vent. rate has increased BY  99 BPM  ST now depressed in Anterolateral leads  T wave inversion now evident in Lateral leads    Referred By: EDMD           Confirmed By:           ECG 12 Lead Rhythm Change   Preliminary Result   Test Reason : Rhythm Change   Blood Pressure :   */*   mmHG   Vent. Rate : 128 BPM     Atrial Rate : 127 BPM      P-R Int :   * ms          QRS Dur :  84 ms       QT  Int : 332 ms       P-R-T Axes :   *  24 206 degrees      QTc Int : 484 ms      Atrial fibrillation with rapid ventricular response with premature    ventricular or aberrantly conducted complexes   Possible Anterior infarct , age undetermined   ST & T wave abnormality, consider inferolateral ischemia   Abnormal ECG   When compared with ECG of 01-OCT-2023 13:09, (Unconfirmed)   No significant change was found      Referred By: CALI           Confirmed By:       ECG 12 Lead ED Triage Standing Order; Dysrhythmia   Preliminary Result   Test Reason : ED Triage Standing Order~   Blood Pressure :   */*   mmHG   Vent. Rate : 152 BPM     Atrial Rate : 178 BPM      P-R Int :   * ms          QRS Dur :  80 ms       QT Int : 290 ms       P-R-T Axes :   *  19 221 degrees      QTc Int : 461 ms      Atrial fibrillation with rapid ventricular response with premature    ventricular or aberrantly conducted complexes   ST & T wave abnormality, consider inferolateral ischemia   Abnormal ECG   When compared with ECG of 02-APR-2020 22:19,   Atrial fibrillation has replaced Sinus rhythm   Vent. rate has increased BY  99 BPM   ST now depressed in Anterolateral leads   T wave inversion now evident in Lateral leads      Referred By: MADALYN           Confirmed By:                                                Medical Decision Making  Problems Addressed:  Abnormal chest x-ray: complicated acute illness or injury  Acute congestive heart failure, unspecified heart failure type: complicated acute illness or injury  Atrial fibrillation with rapid ventricular response: complicated acute illness or injury  Elevated serum creatinine: complicated acute illness or injury  Elevated troponin: complicated acute illness or injury  Shortness of breath: complicated acute illness or injury    Amount and/or Complexity of Data Reviewed  Labs: ordered.  Radiology: ordered.  ECG/medicine tests: ordered.    Risk  Prescription drug management.  Decision regarding  hospitalization.        Final diagnoses:   Atrial fibrillation with rapid ventricular response   Elevated troponin   Shortness of breath   Abnormal chest x-ray   Acute congestive heart failure, unspecified heart failure type   Elevated serum creatinine       ED Disposition  ED Disposition       ED Disposition   Decision to Admit    Condition   --    Comment   Level of Care: Telemetry [5]   Diagnosis: Rapid atrial fibrillation [518141]   Admitting Physician: MARYBEL SANCHEZ [[id number]]   Attending Physician: MARYBEL SANCHEZ [[id number]]   Certification: I Certify That Inpatient Hospital Services Are Medically Necessary For Greater Than 2 Midnights                  Beny Arredondo DO  10/02/23 0354

## 2023-10-01 NOTE — LETTER
EMS Transport Request  For use at [facility], [place], [place], and [place] only   Patient Name: Amanda Glover : 10/31/1932   Weight:74.8 kg (164 lb 14.4 oz) Pick-up Location: [facility] BLS/ALS: BLS/ALS: BLS   Insurance: MEDICARE Auth End Date:    Pre-Cert #: D/C Summary complete:    Destination: [facility]    Contact Precautions: None   Equipment (O2, Fluids, etc.): None   Arrive By Date/Time: 10/06/2023, afternoon Stretcher/WC: Stretcher   CM Requesting: Rose Corley RN Ext: [phone]   Notes/Medical Necessity:      ______________________________________________________________________    *Only 2 patient bags OR 1 carry-on size bag are permitted.  Wheelchairs and walkers CANNOT transported with the patient. Acknowledge: Yes

## 2023-10-02 ENCOUNTER — APPOINTMENT (OUTPATIENT)
Dept: CARDIOLOGY | Facility: HOSPITAL | Age: 88
DRG: 308 | End: 2023-10-02
Payer: MEDICARE

## 2023-10-02 LAB
ANION GAP SERPL CALCULATED.3IONS-SCNC: 13 MMOL/L (ref 5–15)
ANION GAP SERPL CALCULATED.3IONS-SCNC: 13 MMOL/L (ref 5–15)
BH CV ECHO MEAS - AI P1/2T: 486.1 MSEC
BH CV ECHO MEAS - AO MAX PG: 54 MMHG
BH CV ECHO MEAS - AO MEAN PG: 34 MMHG
BH CV ECHO MEAS - AO ROOT DIAM: 3.5 CM
BH CV ECHO MEAS - AO V2 MAX: 346.8 CM/SEC
BH CV ECHO MEAS - AO V2 VTI: 59.4 CM
BH CV ECHO MEAS - AVA(I,D): 0.88 CM2
BH CV ECHO MEAS - EDV(CUBED): 79.5 ML
BH CV ECHO MEAS - EDV(MOD-SP2): 65 ML
BH CV ECHO MEAS - EDV(MOD-SP4): 64.1 ML
BH CV ECHO MEAS - EF(MOD-SP2): 60.8 %
BH CV ECHO MEAS - EF(MOD-SP4): 44 %
BH CV ECHO MEAS - ESV(CUBED): 32.8 ML
BH CV ECHO MEAS - ESV(MOD-SP2): 25.5 ML
BH CV ECHO MEAS - ESV(MOD-SP4): 35.9 ML
BH CV ECHO MEAS - FS: 25.6 %
BH CV ECHO MEAS - IVS/LVPW: 1 CM
BH CV ECHO MEAS - IVSD: 1.2 CM
BH CV ECHO MEAS - LA DIMENSION: 4.7 CM
BH CV ECHO MEAS - LAT PEAK E' VEL: 12.7 CM/SEC
BH CV ECHO MEAS - LV MASS(C)D: 184.7 GRAMS
BH CV ECHO MEAS - LV MAX PG: 3.2 MMHG
BH CV ECHO MEAS - LV MEAN PG: 2 MMHG
BH CV ECHO MEAS - LV V1 MAX: 89.4 CM/SEC
BH CV ECHO MEAS - LV V1 VTI: 18.4 CM
BH CV ECHO MEAS - LVIDD: 4.3 CM
BH CV ECHO MEAS - LVIDS: 3.2 CM
BH CV ECHO MEAS - LVOT AREA: 2.8 CM2
BH CV ECHO MEAS - LVOT DIAM: 1.9 CM
BH CV ECHO MEAS - LVPWD: 1.2 CM
BH CV ECHO MEAS - MED PEAK E' VEL: 10.9 CM/SEC
BH CV ECHO MEAS - MV DEC SLOPE: 622 CM/SEC2
BH CV ECHO MEAS - MV DEC TIME: 0.06 SEC
BH CV ECHO MEAS - MV E MAX VEL: 105 CM/SEC
BH CV ECHO MEAS - MV MAX PG: 3.1 MMHG
BH CV ECHO MEAS - MV MEAN PG: 1.5 MMHG
BH CV ECHO MEAS - MV P1/2T: 37.7 MSEC
BH CV ECHO MEAS - MV V2 VTI: 17.2 CM
BH CV ECHO MEAS - MVA(P1/2T): 5.8 CM2
BH CV ECHO MEAS - MVA(VTI): 3 CM2
BH CV ECHO MEAS - PA ACC TIME: 0.1 SEC
BH CV ECHO MEAS - RAP SYSTOLE: 8 MMHG
BH CV ECHO MEAS - RVSP: 42 MMHG
BH CV ECHO MEAS - SV(LVOT): 52.2 ML
BH CV ECHO MEAS - SV(MOD-SP2): 39.5 ML
BH CV ECHO MEAS - SV(MOD-SP4): 28.2 ML
BH CV ECHO MEAS - TAPSE (>1.6): 1.6 CM
BH CV ECHO MEAS - TR MAX PG: 33.5 MMHG
BH CV ECHO MEAS - TR MAX VEL: 288.6 CM/SEC
BH CV ECHO MEASUREMENTS AVERAGE E/E' RATIO: 8.9
BH CV XLRA - RV BASE: 2.6 CM
BH CV XLRA - RV LENGTH: 6.9 CM
BH CV XLRA - RV MID: 2.3 CM
BH CV XLRA - TDI S': 13.6 CM/SEC
BUN SERPL-MCNC: 41 MG/DL (ref 8–23)
BUN SERPL-MCNC: 43 MG/DL (ref 8–23)
BUN/CREAT SERPL: 26.8 (ref 7–25)
BUN/CREAT SERPL: 27.4 (ref 7–25)
CALCIUM SPEC-SCNC: 9 MG/DL (ref 8.2–9.6)
CALCIUM SPEC-SCNC: 9.4 MG/DL (ref 8.2–9.6)
CHLORIDE SERPL-SCNC: 101 MMOL/L (ref 98–107)
CHLORIDE SERPL-SCNC: 103 MMOL/L (ref 98–107)
CO2 SERPL-SCNC: 28 MMOL/L (ref 22–29)
CO2 SERPL-SCNC: 31 MMOL/L (ref 22–29)
CREAT SERPL-MCNC: 1.53 MG/DL (ref 0.57–1)
CREAT SERPL-MCNC: 1.57 MG/DL (ref 0.57–1)
DEPRECATED RDW RBC AUTO: 45.6 FL (ref 37–54)
EGFRCR SERPLBLD CKD-EPI 2021: 31.2 ML/MIN/1.73
EGFRCR SERPLBLD CKD-EPI 2021: 32.2 ML/MIN/1.73
ERYTHROCYTE [DISTWIDTH] IN BLOOD BY AUTOMATED COUNT: 14.3 % (ref 12.3–15.4)
GLUCOSE SERPL-MCNC: 130 MG/DL (ref 65–99)
GLUCOSE SERPL-MCNC: 98 MG/DL (ref 65–99)
HBA1C MFR BLD: 5.4 % (ref 4.8–5.6)
HCT VFR BLD AUTO: 30.8 % (ref 34–46.6)
HGB BLD-MCNC: 9.6 G/DL (ref 12–15.9)
IRON 24H UR-MRATE: 30 MCG/DL (ref 37–145)
IRON SATN MFR SERPL: 15 % (ref 20–50)
LEFT ATRIUM VOLUME INDEX: 52.6 ML/M2
MCH RBC QN AUTO: 27.4 PG (ref 26.6–33)
MCHC RBC AUTO-ENTMCNC: 31.2 G/DL (ref 31.5–35.7)
MCV RBC AUTO: 88 FL (ref 79–97)
PLATELET # BLD AUTO: 325 10*3/MM3 (ref 140–450)
PMV BLD AUTO: 10.8 FL (ref 6–12)
POTASSIUM SERPL-SCNC: 3.8 MMOL/L (ref 3.5–5.2)
POTASSIUM SERPL-SCNC: 3.8 MMOL/L (ref 3.5–5.2)
QT INTERVAL: 290 MS
QTC INTERVAL: 461 MS
RBC # BLD AUTO: 3.5 10*6/MM3 (ref 3.77–5.28)
SODIUM SERPL-SCNC: 144 MMOL/L (ref 136–145)
SODIUM SERPL-SCNC: 145 MMOL/L (ref 136–145)
TIBC SERPL-MCNC: 197 MCG/DL (ref 298–536)
TRANSFERRIN SERPL-MCNC: 132 MG/DL (ref 200–360)
WBC NRBC COR # BLD: 9.27 10*3/MM3 (ref 3.4–10.8)

## 2023-10-02 PROCEDURE — 25010000002 NA FERRIC GLUC CPLX PER 12.5 MG: Performed by: STUDENT IN AN ORGANIZED HEALTH CARE EDUCATION/TRAINING PROGRAM

## 2023-10-02 PROCEDURE — 85027 COMPLETE CBC AUTOMATED: CPT | Performed by: PHYSICIAN ASSISTANT

## 2023-10-02 PROCEDURE — 93306 TTE W/DOPPLER COMPLETE: CPT

## 2023-10-02 PROCEDURE — 83036 HEMOGLOBIN GLYCOSYLATED A1C: CPT | Performed by: PHYSICIAN ASSISTANT

## 2023-10-02 PROCEDURE — 80048 BASIC METABOLIC PNL TOTAL CA: CPT | Performed by: PHYSICIAN ASSISTANT

## 2023-10-02 PROCEDURE — 25010000002 MAGNESIUM SULFATE 2 GM/50ML SOLUTION

## 2023-10-02 PROCEDURE — 84466 ASSAY OF TRANSFERRIN: CPT

## 2023-10-02 PROCEDURE — 93306 TTE W/DOPPLER COMPLETE: CPT | Performed by: INTERNAL MEDICINE

## 2023-10-02 PROCEDURE — 99222 1ST HOSP IP/OBS MODERATE 55: CPT | Performed by: INTERNAL MEDICINE

## 2023-10-02 PROCEDURE — 25010000002 AMIODARONE IN DEXTROSE 5% 360-4.14 MG/200ML-% SOLUTION: Performed by: PEDIATRICS

## 2023-10-02 PROCEDURE — 80048 BASIC METABOLIC PNL TOTAL CA: CPT

## 2023-10-02 PROCEDURE — 25010000002 DIGOXIN PER 500 MCG

## 2023-10-02 PROCEDURE — 83540 ASSAY OF IRON: CPT

## 2023-10-02 RX ORDER — MAGNESIUM SULFATE HEPTAHYDRATE 40 MG/ML
2 INJECTION, SOLUTION INTRAVENOUS ONCE
Status: COMPLETED | OUTPATIENT
Start: 2023-10-02 | End: 2023-10-02

## 2023-10-02 RX ORDER — DIGOXIN 0.25 MG/ML
500 INJECTION INTRAMUSCULAR; INTRAVENOUS ONCE
Status: COMPLETED | OUTPATIENT
Start: 2023-10-02 | End: 2023-10-02

## 2023-10-02 RX ORDER — BUMETANIDE 0.25 MG/ML
0.5 INJECTION INTRAMUSCULAR; INTRAVENOUS EVERY 12 HOURS
Status: DISCONTINUED | OUTPATIENT
Start: 2023-10-02 | End: 2023-10-04

## 2023-10-02 RX ADMIN — SENNOSIDES AND DOCUSATE SODIUM 1 TABLET: 50; 8.6 TABLET ORAL at 10:18

## 2023-10-02 RX ADMIN — DOXYCYCLINE 100 MG: 100 CAPSULE ORAL at 20:32

## 2023-10-02 RX ADMIN — DIGOXIN 500 MCG: 0.25 INJECTION INTRAMUSCULAR; INTRAVENOUS at 10:17

## 2023-10-02 RX ADMIN — Medication 5 MG: at 20:32

## 2023-10-02 RX ADMIN — SODIUM CHLORIDE 125 MG: 9 INJECTION, SOLUTION INTRAVENOUS at 17:59

## 2023-10-02 RX ADMIN — AMIODARONE HYDROCHLORIDE 0.5 MG/MIN: 1.8 INJECTION, SOLUTION INTRAVENOUS at 03:34

## 2023-10-02 RX ADMIN — BUMETANIDE 0.5 MG: 0.25 INJECTION, SOLUTION INTRAMUSCULAR; INTRAVENOUS at 12:23

## 2023-10-02 RX ADMIN — AMIODARONE HYDROCHLORIDE 0.5 MG/MIN: 1.8 INJECTION, SOLUTION INTRAVENOUS at 17:55

## 2023-10-02 RX ADMIN — APIXABAN 2.5 MG: 2.5 TABLET, FILM COATED ORAL at 10:17

## 2023-10-02 RX ADMIN — METOPROLOL TARTRATE 50 MG: 50 TABLET ORAL at 20:31

## 2023-10-02 RX ADMIN — DOXYCYCLINE 100 MG: 100 CAPSULE ORAL at 10:17

## 2023-10-02 RX ADMIN — METOPROLOL TARTRATE 50 MG: 50 TABLET ORAL at 10:17

## 2023-10-02 RX ADMIN — MAGNESIUM SULFATE HEPTAHYDRATE 2 G: 40 INJECTION, SOLUTION INTRAVENOUS at 10:17

## 2023-10-02 RX ADMIN — CEFUROXIME AXETIL 250 MG: 250 TABLET, FILM COATED ORAL at 20:32

## 2023-10-02 RX ADMIN — CEFUROXIME AXETIL 250 MG: 250 TABLET, FILM COATED ORAL at 10:17

## 2023-10-02 RX ADMIN — Medication 10 ML: at 10:18

## 2023-10-02 RX ADMIN — APIXABAN 2.5 MG: 2.5 TABLET, FILM COATED ORAL at 20:32

## 2023-10-02 NOTE — CONSULTS
Cardiology Consult       IDENTIFICATION: 89 y.o.  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    Reason for consultation:  Atrial fibrillation with RVR    Requesting provider: John Israel DO  Consulting provider: Enio Becker MD, [facility]        Active Hospital Problems    Diagnosis  POA    Atrial fibrillation with RVR [I48.91]  Yes    Elevated troponin [R79.89]  Yes    History of DVT (deep vein thrombosis) - 2020 [Z86.718]  Not Applicable    Alzheimer's dementia [G30.9, F02.80]  Unknown    CKD (chronic kidney disease), stage III [N18.30]  Unknown    Elevated serum creatinine [R79.89]  Unknown    Elevated transaminase level [R74.01]  Unknown    Community acquired pneumonia of right lung [J18.9]  Unknown    Hypoxia [R09.02]  Unknown    Aortic valve stenosis, mild [I35.0]  Yes     1 echo 3-16-20:   There is moderate calcification of the aortic valve.Mild aortic valve regurgitation is present. Mild aortic valve stenosis is present  Aortic valve mean pressure gradient is 9.0 mmHg. Aortic valve area is 1.4 cm2. Aortic valve dimensionless index is 0.4.      Hypertension [I10]  Yes     Atrial fibrillation with RVR, on Eliquis ([id number] score 8; 8.7% annual stroke risk)  Hypertension, probably essential  Hyperlipidemia  History of DVT, 2020 following hip fracture  CKD III with MEÑO  AV stenosis  AV mean pressure gradient 9 mmHg. AV area 1.4 cm2.  (March 2020)  Residual CCS 1 angina pectoris with mild elevation serial HS troponins and NYHA class III-IV CHF, October 2023  Alzheimer's dementia, resides at   [facility]   Previous hip fracture repair      History of present illness:  Amanda Glover is a 89 y.o. female with above PMH who is seen today in consultation for atrial fibrillation with RVR, prominent systolic cardiac murmur, and CHF. She presented to the ED 10/1/2023 with 2-3 day history of dyspnea and lethargy. Daughter at bedside  "states that patient developed progressive dyspnea over the few days leading up to admission, and was staying in bed longer than is typical for her. In the ED she was noted to be in afib with RVR with rates as high as 150's. HS troponin 112 --> 93, proBNP > 19,000. CXR consistent with infection versus pulmonary edema with out CT scan imaging. She was initiated on diltiazem infusion and given 40 mg IV Lasix. Home dose of metoprolol tartrate has also been increased. She was hypotensive overnight, and subsequently transitioned from IV diltiazem to IV amiodarone. Cardioversion has previously been discussed with patient's family, who are only interested in medical management of afib at this time. She was previously evaluated by Dr Chand in 2020, following prior hospital admission with hip fracture, when she initially went into afib. At that time, patient had converted to NSR on medication and was doing well from a cardiac standpoint. She has not followed up with Dr. Chand due to Covid and moving into the nursing home. Daughter states that, as far as she knows, patient has stayed out of afib, and consistently has \"normal\" HR when vitals are checked. Daughter denies any recent illnesses or hospitalizations since her last admission in 2020 for hip fracture. Patient is able to ambulate with walker, and is independent with feeding/dressing, but does require some assistance with bathing. On exam, patient denies any shortness of breath, chest pain, pressure, tightness, or dizziness, and appears comfortable.  She questions when she can go home and states she was able to eat breakfast earlier.      Allergies   Allergen Reactions    Influenza Vaccine Live Other (See Comments)     Patient unsure       Prior to Admission medications    Medication Sig Start Date End Date Taking? Authorizing Provider   acetaminophen (TYLENOL) 500 MG tablet Take 1 tablet by mouth Every 6 (Six) Hours As Needed for Mild Pain, Headache or Fever.   " Yes Kay Richter MD   apixaban (ELIQUIS) 5 MG tablet tablet Take 1 tablet by mouth Every 12 (Twelve) Hours. Indications: DVT/PE (active thrombosis)  Patient taking differently: Take 2.5 mg by mouth 2 (Two) Times a Day. Indications: DVT/PE (active thrombosis) 3/24/20  Yes Jimmy Gamble PA   benazepril (LOTENSIN) 20 MG tablet Take 1 tablet by mouth Daily. 3/24/20  Yes Denis Chand MD   doxycycline (VIBRAMYICN) 100 MG tablet Take 1 tablet by mouth 2 (Two) Times a Day. For 10 days, stared on 09-29-23   Yes Kay Richter MD   furosemide (LASIX) 40 MG tablet Take 1 tablet by mouth Daily.   Yes Kay Richter MD   hydrALAZINE (APRESOLINE) 50 MG tablet Take 1 tablet by mouth 3 (Three) Times a Day.   Yes Kay Richter MD   metOLazone (ZAROXOLYN) 2.5 MG tablet Take 1 tablet by mouth 2 (Two) Times a Day.   Yes Kay Richter MD   metoprolol tartrate (LOPRESSOR) 25 MG tablet Take 1 tablet by mouth 2 (Two) Times a Day. 6/30/20  Yes Denis Chand MD   potassium chloride (K-DUR,KLOR-CON) 20 MEQ CR tablet Take 1 tablet by mouth every night at bedtime.   Yes Kay Richter MD   sennosides-docusate (senna-docusate sodium) 8.6-50 MG per tablet Take 1 tablet by mouth Daily.   Yes Kay Richter MD       Past Medical History:   Diagnosis Date    Aortic valve stenosis     Atrial fibrillation     Deep vein thrombosis     Dementia     Hyperlipidemia     Hypertension     Murmur     Osteoporosis        Past Surgical History:   Procedure Laterality Date    APPENDECTOMY      FEMUR FRACTURE SURGERY Left 01/2020    HERNIA REPAIR      HYSTERECTOMY         Family History   Problem Relation Age of Onset    Dementia Father        Social History     Tobacco Use   Smoking Status Never   Smokeless Tobacco Never       Social History     Substance and Sexual Activity   Alcohol Use Never         Review of Systems:   Review of Systems   Cardiovascular:  Positive for dyspnea on  exertion and leg swelling. Negative for chest pain, near-syncope and syncope.   Respiratory:  Positive for shortness of breath.    All other systems reviewed and are negative.         Vital Sign Min/Max for last 24 hours  Temp  Min: 97.6 °F (36.4 °C)  Max: 98.2 °F (36.8 °C)   BP  Min: 68/23  Max: 140/115   Pulse  Min: 72  Max: 157   Resp  Min: 16  Max: 18   SpO2  Min: 80 %  Max: 100 %   Flow (L/min)  Min: 2  Max: 3    No intake or output data in the 24 hours ending 10/02/23 0827        [facility]    Vitals and nursing note reviewed.   Constitutional:       General: Awake.   Pulmonary:      Effort: Pulmonary effort is normal.      Breath sounds: Decreased breath sounds present. Rales present.   Cardiovascular:      PMI at left midclavicular line. Tachycardia present. Irregular rhythm. Normal S1. Normal S2.       Murmurs: There is a harsh midsystolic murmur at the URSB, radiating to the neck.   Pulses:     Intact distal pulses.   Edema:     Thigh: bilateral trace edema of the thigh.     Pretibial: bilateral trace edema of the pretibial area.     Ankle: bilateral trace edema of the ankle.     Feet: bilateral trace edema of the feet.  Skin:     General: Skin is warm and dry.      Capillary Refill: Capillary refill takes less than 2 seconds.      Findings: Ecchymosis present.   Neurological:      Mental Status: Alert.        DATA REVIEW:    EKG (10/1/2023):     Vent. Rate : 128 BPM     Atrial Rate : 127 BPM     P-R Int :   * ms          QRS Dur :  84 ms      QT Int : 332 ms       P-R-T Axes :   *  24 206 degrees     QTc Int : 484 ms     Atrial fibrillation with rapid ventricular response with premature ventricular or aberrantly conducted complexes  Possible Anterior infarct , age undetermined  ST & T wave abnormality, consider inferolateral ischemia  Abnormal ECG    ECHO: Pending      Results from last 7 days   Lab Units 10/02/23  0436 10/01/23  1307   SODIUM mmol/L 144 146*   POTASSIUM mmol/L 3.8 3.7    CHLORIDE mmol/L 103 104   BUN mg/dL 43* 38*   CREATININE mg/dL 1.57* 1.55*   MAGNESIUM mg/dL  --  2.1     Lab Results   Lab Value Date/Time    MG 2.1 10/01/2023 1307     Results from last 7 days   Lab Units 10/01/23  1456 10/01/23  1307   HSTROP T ng/L 93* 112*     Results from last 7 days   Lab Units 10/02/23  0436 10/01/23  1307   WBC 10*3/mm3 9.27 11.46*   HEMOGLOBIN g/dL 9.6* 10.3*   HEMATOCRIT % 30.8* 33.0*   PLATELETS 10*3/mm3 325 412     Lab Results   Component Value Date    HGBA1C 5.40 10/02/2023     No results found for: CHOL, CHLPL, TRIG, HDL, LDL, LDLDIRECT      Atrial fibrillation with RVR  CHADSVASC 8 on  Chronic Eliquis  Amiodarone infusion. Metoprolol 50 mg twice daily  Family not interested in ECV at this time. Favor medical management.   Elevated troponin  112, 93, trending down. EKG without evidence of ACS. Likely demand ischemia, given afib RVR.  Defer MPS/LHC  Probable acute on chronic systolic and diastolic congestive heart failure  Echo pending. ProBNP > 19,000. CXR concerning for pulmonary edema.  Needs low-dose empiric diuresis and defer invasive studies; doubt candidate for TAVR  Aortic stenosis, mild  Mean gradient 9 mmHg on most recent echo in 2020.   Suspect progressive AS although murmur may be exacerbated by significant anemia  Hypertension, probably essential  Hypotensive overnight with elevated HR. BP currently stable.   Concur with holding home medications and may need to be placed on low-dose midodrine 2.5-5 mg TID during current hospitalization  CKD III with MEÑO  Creatinine 1.57  No urinalysis or urine studies to review and no prior renal ultrasound available  Current baseline serum creatinine unknown  Anemia  H/H 9.6/30.8  Recommend further workup/treatment    Patient with recurrence of atrial fibrillation with ongoing RVR, and evidence of acute heart failure. Discussed cardioversion again with patient and family at bedside. Family prefers to avoid ECV if possible, and favor  medical management at this time.   Given persistently elevated rates, we will give one time dose of digoxin 500 mcg now, as well as 2 g magnesium.  Agree with low-dose Eliquis, given age and decreased renal function.   IV Bumex 0.5 mg twice daily with close monitoring of renal function.   Check AM EKG/magnesium level/renal function panel.   If patient develops hypotension again, would recommend midodrine.   Recommend further workup/treatment of anemia. Will check iron studies.   Consider urinalysis as well as iron studies as well as FOBT    PATRICIO Trinidad    Electronically signed by PATRICIO Trinidad, 10/02/23, 8:27 AM EDT.    Discussed with patient and 5 family members in room.    I have seen and examined the patient, case was discussed with the physician extender, reviewed the above note, necessary changes were made and I agree with the final note.     Enio Becker MD [facility]

## 2023-10-02 NOTE — PLAN OF CARE
Goal Outcome Evaluation:  Plan of Care Reviewed With: patient        Progress: improving  Outcome Evaluation: Afib w/ rvr, cardizem gtts stoped due to low BP, asymptomatic, amiodorone gtts iniated, BP is improving, adequate uop, pt confused, alert but disoriented to time, place, and situation, no BM at this shift.      Problem: Adult Inpatient Plan of Care  Goal: Absence of Hospital-Acquired Illness or Injury  Intervention: Identify and Manage Fall Risk  Recent Flowsheet Documentation  Taken 10/1/2023 2000 by Chip King RN  Safety Promotion/Fall Prevention: activity supervised  Intervention: Prevent Skin Injury  Recent Flowsheet Documentation  Taken 10/1/2023 2000 by Chip King RN  Body Position: position changed independently  Intervention: Prevent and Manage VTE (Venous Thromboembolism) Risk  Recent Flowsheet Documentation  Taken 10/1/2023 2000 by Chip King RN  Activity Management: up to bedside commode     Problem: Adult Inpatient Plan of Care  Goal: Absence of Hospital-Acquired Illness or Injury  Intervention: Prevent Skin Injury  Recent Flowsheet Documentation  Taken 10/1/2023 2000 by Chip King RN  Body Position: position changed independently     Problem: Adult Inpatient Plan of Care  Goal: Absence of Hospital-Acquired Illness or Injury  Intervention: Prevent and Manage VTE (Venous Thromboembolism) Risk  Recent Flowsheet Documentation  Taken 10/1/2023 2000 by Chip Kign RN  Activity Management: up to bedside commode

## 2023-10-02 NOTE — PROGRESS NOTES
[facility] Medicine Services  PROGRESS NOTE    Patient Name: Amanda Glover  : 10/31/1932  MRN: [id number]    Date of Admission: 10/1/2023  Primary Care Physician: Denis Lr MD    Subjective   Subjective     CC:  Shortness of breath, lethargy    HPI:  Patient resting comfortably in bed, has no complaints this morning.  Denies any shortness of breath or chest pain.  5 family members present at bedside and reports she is much more alert and interactive this morning compared to yesterday.      Objective   Objective     Vital Signs:   Temp:  [97.6 °F (36.4 °C)-98.2 °F (36.8 °C)] 97.8 °F (36.6 °C)  Heart Rate:  [] 107  Resp:  [16-18] 16  BP: ()/() 110/97  Flow (L/min):  [2-3] 2     Physical Exam:  General appearance: alert, awake, no acute distress   Cardiovascular: Irregularly irregular, systolic murmur, radial pulse full 2/4 BL   Respiratory: CTAB, no crackles or wheezes, oxygenating well on 2 L nasal cannula  Abdomen: soft, non-tender, no organomegaly, bowel sounds normoactive        Results Reviewed:  LAB RESULTS:      Lab 10/02/23  0436 10/01/23  1307   WBC 9.27 11.46*   HEMOGLOBIN 9.6* 10.3*   HEMATOCRIT 30.8* 33.0*   PLATELETS 325 412   NEUTROS ABS  --  9.28*   IMMATURE GRANS (ABS)  --  0.07*   LYMPHS ABS  --  1.18   MONOS ABS  --  0.73   EOS ABS  --  0.18   MCV 88.0 88.0   PROCALCITONIN  --  0.10   LACTATE  --  1.8         Lab 10/02/23  0436 10/01/23  1307   SODIUM 144 146*   POTASSIUM 3.8 3.7   CHLORIDE 103 104   CO2 28.0 29.0   ANION GAP 13.0 13.0   BUN 43* 38*   CREATININE 1.57* 1.55*   EGFR 31.2* 31.7*   GLUCOSE 98 124*   CALCIUM 9.0 9.2   MAGNESIUM  --  2.1   HEMOGLOBIN A1C 5.40  --    TSH  --  0.927         Lab 10/01/23  1307   TOTAL PROTEIN 6.9   ALBUMIN 3.4*   GLOBULIN 3.5   ALT (SGPT) 53*   AST (SGOT) 37*   BILIRUBIN 0.5   ALK PHOS 119*         Lab 10/01/23  1456 10/01/23  1307   PROBNP  --  19,889.0*   HSTROP T 93* 112*             Lab 10/02/23  8774    IRON 30*   IRON SATURATION (TSAT) 15*   TIBC 197*   TRANSFERRIN 132*         Brief Urine Lab Results       None            Microbiology Results Abnormal       Procedure Component Value - Date/Time    COVID PRE-OP / PRE-PROCEDURE SCREENING ORDER (NO ISOLATION) - Swab, Nasopharynx [[id number]]  (Normal) Collected: 10/01/23 1457    Lab Status: Final result Specimen: Swab from Nasopharynx Updated: 10/01/23 7128    Narrative:      The following orders were created for panel order COVID PRE-OP / PRE-PROCEDURE SCREENING ORDER (NO ISOLATION) - Swab, Nasopharynx.  Procedure                               Abnormality         Status                     ---------                               -----------         ------                     Respiratory Panel PCR w/...[[id number]]  Normal              Final result                 Please view results for these tests on the individual orders.    Respiratory Panel PCR w/COVID-19(SARS-CoV-2) PHI/PERFECTO/[place]/PAD/COR/MAD/NIRU In-House, NP Swab in UTM/VTM, 3-4 HR TAT - Swab, Nasopharynx [[id number]]  (Normal) Collected: 10/01/23 1457    Lab Status: Final result Specimen: Swab from Nasopharynx Updated: 10/01/23 1550     ADENOVIRUS, PCR Not Detected     Coronavirus 229E Not Detected     Coronavirus HKU1 Not Detected     Coronavirus NL63 Not Detected     Coronavirus OC43 Not Detected     COVID19 Not Detected     Human Metapneumovirus Not Detected     Human Rhinovirus/Enterovirus Not Detected     Influenza A PCR Not Detected     Influenza B PCR Not Detected     Parainfluenza Virus 1 Not Detected     Parainfluenza Virus 2 Not Detected     Parainfluenza Virus 3 Not Detected     Parainfluenza Virus 4 Not Detected     RSV, PCR Not Detected     Bordetella pertussis pcr Not Detected     Bordetella parapertussis PCR Not Detected     Chlamydophila pneumoniae PCR Not Detected     Mycoplasma pneumo by PCR Not Detected    Narrative:      In the setting of a positive respiratory panel with a viral infection  PLUS a negative procalcitonin without other underlying concern for bacterial infection, consider observing off antibiotics or discontinuation of antibiotics and continue supportive care. If the respiratory panel is positive for atypical bacterial infection (Bordetella pertussis, Chlamydophila pneumoniae, or Mycoplasma pneumoniae), consider antibiotic de-escalation to target atypical bacterial infection.            XR Chest 1 View    Result Date: 10/1/2023  XR CHEST 1 VW Date of Exam: 10/1/2023 1:10 PM EDT Indication: Dysrhythmia triage protocol Comparison: Chest x-ray 3/16/2020 Findings: There is mild cardiomegaly. There are groundglass opacities throughout the right lung, worse in the right upper lobe. No significant pleural effusion. No pneumothorax. No acute osseous findings.     Impression: Impression: Groundglass opacities throughout the right lung compatible with infection. Electronically Signed: Silas Looney MD  10/1/2023 1:55 PM EDT  Workstation ID: [id number]     Results for orders placed during the hospital encounter of 03/16/20    Adult Transthoracic Echo Complete W/ Cont if Necessary Per Protocol    Interpretation Summary  · Left ventricular systolic function is normal. Estimated EF variable secondary to underlying atrial fibrillation appears to be in the range of 56 - 60%  · All left ventricular wall segments contract normally.  · There is moderate calcification of the aortic valve.Mild aortic valve regurgitation is present. Mild aortic valve stenosis is present  · Aortic valve mean pressure gradient is 9.0 mmHg. Aortic valve area is 1.4 cm2. Aortic valve dimensionless index is 0.4.  · Mild MAC is present. Mild mitral valve regurgitation is present  · Mild to moderate tricuspid valve regurgitation is present. Estimated right ventricular systolic pressure from tricuspid regurgitation is normal (<35 mmHg).      Current medications:  Scheduled Meds:apixaban, 2.5 mg, Oral, BID  bumetanide, 0.5 mg,  Intravenous, Q12H  cefuroxime, 250 mg, Oral, Q12H  doxycycline, 100 mg, Oral, Q12H  melatonin, 5 mg, Oral, Nightly  metoprolol tartrate, 50 mg, Oral, Q12H  sennosides-docusate, 1 tablet, Oral, Daily  sodium chloride, 10 mL, Intravenous, Q12H      Continuous Infusions:amiodarone, 0.5 mg/min, Last Rate: 0.5 mg/min (10/02/23 1345)      PRN Meds:.  acetaminophen    calcium carbonate    famotidine    nitroglycerin    prochlorperazine **OR** prochlorperazine    sodium chloride    sodium chloride    sodium chloride    Assessment & Plan   Assessment & Plan     Active Hospital Problems    Diagnosis  POA    Atrial fibrillation with RVR [I48.91]  Yes    Elevated troponin [R79.89]  Yes    History of DVT (deep vein thrombosis) - 2020 [Z86.718]  Not Applicable    Alzheimer's dementia [G30.9, F02.80]  Unknown    CKD (chronic kidney disease), stage III [N18.30]  Unknown    Elevated serum creatinine [R79.89]  Unknown    Elevated transaminase level [R74.01]  Unknown    Community acquired pneumonia of right lung [J18.9]  Unknown    Hypoxia [R09.02]  Unknown    Aortic valve stenosis, mild [I35.0]  Yes    Hypertension [I10]  Yes      Resolved Hospital Problems   No resolved problems to display.     This patient's assessments and plans were partially entered by my partner and updated as appropriate by me on 10/2/23     Brief Hospital Course to date:  Amanda Glover is a 89 y.o. female with past medical history significant for hypertension, paroxysmal atrial fibrillation on Eliquis, aortic stenosis, CKD stage III, VTE, Alzheimer's dementia, who was admitted for atrial fibrillation with RVR.  Cardiology consulted, offered cardioversion but family elects for medical management.    Atrial fibrillation with RVR  Elevated troponin due to demand ischemia  Suspected Acute on Chronic heart failure  Mild aortic stenosis  History of hypertension  -Cardiology consulted; continue metoprolol 50mg BID    -Amiodarone infusion   -Digoxin 500 load today     -Monitor on telemetry  -TTE pending; diurese with bumex  -Strict I/Os  -Add midodrine if hypotension occurs; hold antihypertensives at this time  -Continue Eliquis 2.5mg BID   -Optimize electrolytes   -Monitor on telemetry    Pneumonia, RUL   Acute hypoxia   -Stable  -Continue doxycycline and ceftin   -RT O2 per protocol; wean as tolerated     MEÑO on CKD III  -Stable; monitor renal function while diuresing  -BMP in AM   -Avoid nephrotoxic agents     Hypernatremia  -resolved     Acute anemia on anemia of chronic disease  -Monitor, no active signs of bleeding   -Fe infusion today     Expected Discharge Location and Transportation: TBD  Expected Discharge   Expected Discharge Date: 10/4/2023; Expected Discharge Time:      DVT prophylaxis:  Medical DVT prophylaxis orders are present.          CODE STATUS:   Code Status and Medical Interventions:   Ordered at: 10/01/23 1632     Medical Intervention Limits:    NO intubation (DNI)     Level Of Support Discussed With:    Health Care Surrogate     Code Status (Patient has no pulse and is not breathing):    No CPR (Do Not Attempt to Resuscitate)     Medical Interventions (Patient has pulse or is breathing):    Limited Support       John Israel, DO  10/02/23

## 2023-10-02 NOTE — CASE MANAGEMENT/SOCIAL WORK
Discharge Planning Assessment  [facility]     Patient Name: Amanda Glover  MRN: [id number]  Today's Date: 10/2/2023    Admit Date: 10/1/2023    Plan: LT   Discharge Needs Assessment       Row Name 10/02/23 1304       Living Environment    Current Living Arrangements extended care facility    Duration at Residence [facility]    Primary Care Provided by self;other (see comments)  staff at LT facility       Transition Planning    Patient/Family Anticipates Transition to long-term care facility       Discharge Needs Assessment    Readmission Within the Last 30 Days no previous admission in last 30 days    Equipment Currently Used at Home walker, rolling    Concerns to be Addressed discharge planning;basic needs                   Discharge Plan       Row Name 10/02/23 1308       Plan    Plan LTC    Patient/Family in Agreement with Plan yes    Plan Comments I spoke with the daughter/POA for this patient on the phone. She stated that this patient resides at [facility] facility in [facility]. She is independent with tolieting and dressing herself, but needs assistance with all other activities of daily living. She is independent with mobility with the use of a rolling walker. The daughter asked  to make a referral to [place] for a wheelchair, which I did. She anticipates her mother returning to [facility] upon discharge from the hospital. She will need EMS set up for transport. Case management will contineu to follow her plan of care and assist with any discharge planning needs.    Final Discharge Disposition Code 04 - intermediate care facility                  Continued Care and Services - Admitted Since 10/1/2023    Coordination has not been started for this encounter.       Expected Discharge Date and Time       Expected Discharge Date Expected Discharge Time    Oct 4, 2023            Demographic Summary       Row Name 10/02/23 9485       General Information     General Information Comments I confirmed that Fawad Brooks is Ms Adalberto' PCP and she has Medicare AB and [employer] insurance per daughter                   Functional Status       Row Name 10/02/23 1303       Functional Status, IADL    Medications completely dependent    Meal Preparation completely dependent    Housekeeping completely dependent    Laundry completely dependent    Shopping completely dependent                   Psychosocial    No documentation.                  Abuse/Neglect    No documentation.                  Legal    No documentation.                  Substance Abuse    No documentation.                  Patient Forms    No documentation.                     Nelly Neely RN

## 2023-10-03 LAB
ANION GAP SERPL CALCULATED.3IONS-SCNC: 9 MMOL/L (ref 5–15)
BASOPHILS # BLD AUTO: 0.04 10*3/MM3 (ref 0–0.2)
BASOPHILS NFR BLD AUTO: 0.5 % (ref 0–1.5)
BUN SERPL-MCNC: 39 MG/DL (ref 8–23)
BUN/CREAT SERPL: 28.3 (ref 7–25)
CALCIUM SPEC-SCNC: 8.5 MG/DL (ref 8.2–9.6)
CHLORIDE SERPL-SCNC: 101 MMOL/L (ref 98–107)
CO2 SERPL-SCNC: 30 MMOL/L (ref 22–29)
CREAT SERPL-MCNC: 1.38 MG/DL (ref 0.57–1)
DEPRECATED RDW RBC AUTO: 43.4 FL (ref 37–54)
EGFRCR SERPLBLD CKD-EPI 2021: 36.4 ML/MIN/1.73
EOSINOPHIL # BLD AUTO: 0.35 10*3/MM3 (ref 0–0.4)
EOSINOPHIL NFR BLD AUTO: 4.8 % (ref 0.3–6.2)
ERYTHROCYTE [DISTWIDTH] IN BLOOD BY AUTOMATED COUNT: 13.8 % (ref 12.3–15.4)
GLUCOSE SERPL-MCNC: 98 MG/DL (ref 65–99)
HCT VFR BLD AUTO: 31.5 % (ref 34–46.6)
HGB BLD-MCNC: 9.7 G/DL (ref 12–15.9)
IMM GRANULOCYTES # BLD AUTO: 0.08 10*3/MM3 (ref 0–0.05)
IMM GRANULOCYTES NFR BLD AUTO: 1.1 % (ref 0–0.5)
LYMPHOCYTES # BLD AUTO: 0.73 10*3/MM3 (ref 0.7–3.1)
LYMPHOCYTES NFR BLD AUTO: 10 % (ref 19.6–45.3)
MAGNESIUM SERPL-MCNC: 2.1 MG/DL (ref 1.6–2.4)
MCH RBC QN AUTO: 26.6 PG (ref 26.6–33)
MCHC RBC AUTO-ENTMCNC: 30.8 G/DL (ref 31.5–35.7)
MCV RBC AUTO: 86.3 FL (ref 79–97)
MONOCYTES # BLD AUTO: 0.6 10*3/MM3 (ref 0.1–0.9)
MONOCYTES NFR BLD AUTO: 8.2 % (ref 5–12)
NEUTROPHILS NFR BLD AUTO: 5.5 10*3/MM3 (ref 1.7–7)
NEUTROPHILS NFR BLD AUTO: 75.4 % (ref 42.7–76)
NRBC BLD AUTO-RTO: 0 /100 WBC (ref 0–0.2)
PLATELET # BLD AUTO: 323 10*3/MM3 (ref 140–450)
PMV BLD AUTO: 9.9 FL (ref 6–12)
POTASSIUM SERPL-SCNC: 3.3 MMOL/L (ref 3.5–5.2)
POTASSIUM SERPL-SCNC: 4.2 MMOL/L (ref 3.5–5.2)
QT INTERVAL: 306 MS
QTC INTERVAL: 408 MS
RBC # BLD AUTO: 3.65 10*6/MM3 (ref 3.77–5.28)
SODIUM SERPL-SCNC: 140 MMOL/L (ref 136–145)
WBC NRBC COR # BLD: 7.3 10*3/MM3 (ref 3.4–10.8)

## 2023-10-03 PROCEDURE — 97535 SELF CARE MNGMENT TRAINING: CPT

## 2023-10-03 PROCEDURE — 83735 ASSAY OF MAGNESIUM: CPT

## 2023-10-03 PROCEDURE — 97165 OT EVAL LOW COMPLEX 30 MIN: CPT

## 2023-10-03 PROCEDURE — 80048 BASIC METABOLIC PNL TOTAL CA: CPT

## 2023-10-03 PROCEDURE — 97161 PT EVAL LOW COMPLEX 20 MIN: CPT

## 2023-10-03 PROCEDURE — 93010 ELECTROCARDIOGRAM REPORT: CPT | Performed by: INTERNAL MEDICINE

## 2023-10-03 PROCEDURE — 85025 COMPLETE CBC W/AUTO DIFF WBC: CPT | Performed by: STUDENT IN AN ORGANIZED HEALTH CARE EDUCATION/TRAINING PROGRAM

## 2023-10-03 PROCEDURE — 99232 SBSQ HOSP IP/OBS MODERATE 35: CPT

## 2023-10-03 PROCEDURE — 84132 ASSAY OF SERUM POTASSIUM: CPT | Performed by: STUDENT IN AN ORGANIZED HEALTH CARE EDUCATION/TRAINING PROGRAM

## 2023-10-03 PROCEDURE — 93005 ELECTROCARDIOGRAM TRACING: CPT

## 2023-10-03 RX ORDER — POTASSIUM CHLORIDE 20 MEQ/1
40 TABLET, EXTENDED RELEASE ORAL ONCE
Status: COMPLETED | OUTPATIENT
Start: 2023-10-03 | End: 2023-10-03

## 2023-10-03 RX ORDER — AMIODARONE HYDROCHLORIDE 200 MG/1
200 TABLET ORAL EVERY 12 HOURS SCHEDULED
Status: DISCONTINUED | OUTPATIENT
Start: 2023-10-03 | End: 2023-10-06 | Stop reason: HOSPADM

## 2023-10-03 RX ORDER — METOPROLOL TARTRATE 100 MG/1
100 TABLET ORAL EVERY 12 HOURS SCHEDULED
Status: DISCONTINUED | OUTPATIENT
Start: 2023-10-03 | End: 2023-10-04

## 2023-10-03 RX ORDER — METOPROLOL TARTRATE 5 MG/5ML
5 INJECTION INTRAVENOUS EVERY 4 HOURS PRN
Status: DISCONTINUED | OUTPATIENT
Start: 2023-10-03 | End: 2023-10-06 | Stop reason: HOSPADM

## 2023-10-03 RX ADMIN — DOXYCYCLINE 100 MG: 100 CAPSULE ORAL at 09:06

## 2023-10-03 RX ADMIN — APIXABAN 2.5 MG: 2.5 TABLET, FILM COATED ORAL at 20:16

## 2023-10-03 RX ADMIN — BUMETANIDE 0.5 MG: 0.25 INJECTION, SOLUTION INTRAMUSCULAR; INTRAVENOUS at 13:30

## 2023-10-03 RX ADMIN — POTASSIUM CHLORIDE 40 MEQ: 1500 TABLET, EXTENDED RELEASE ORAL at 11:29

## 2023-10-03 RX ADMIN — METOPROLOL TARTRATE 50 MG: 50 TABLET ORAL at 09:06

## 2023-10-03 RX ADMIN — Medication 10 ML: at 20:16

## 2023-10-03 RX ADMIN — AMIODARONE HYDROCHLORIDE 200 MG: 200 TABLET ORAL at 20:16

## 2023-10-03 RX ADMIN — CEFUROXIME AXETIL 250 MG: 250 TABLET, FILM COATED ORAL at 20:16

## 2023-10-03 RX ADMIN — SENNOSIDES AND DOCUSATE SODIUM 1 TABLET: 50; 8.6 TABLET ORAL at 09:06

## 2023-10-03 RX ADMIN — Medication 10 ML: at 09:06

## 2023-10-03 RX ADMIN — CEFUROXIME AXETIL 250 MG: 250 TABLET, FILM COATED ORAL at 09:06

## 2023-10-03 RX ADMIN — METOPROLOL TARTRATE 100 MG: 100 TABLET, FILM COATED ORAL at 20:16

## 2023-10-03 RX ADMIN — DOXYCYCLINE 100 MG: 100 CAPSULE ORAL at 20:16

## 2023-10-03 RX ADMIN — METOPROLOL TARTRATE 25 MG: 25 TABLET, FILM COATED ORAL at 11:29

## 2023-10-03 RX ADMIN — APIXABAN 2.5 MG: 2.5 TABLET, FILM COATED ORAL at 09:06

## 2023-10-03 RX ADMIN — Medication 5 MG: at 20:16

## 2023-10-03 RX ADMIN — BUMETANIDE 0.5 MG: 0.25 INJECTION, SOLUTION INTRAMUSCULAR; INTRAVENOUS at 00:19

## 2023-10-03 RX ADMIN — METOPROLOL TARTRATE 5 MG: 5 INJECTION INTRAVENOUS at 15:11

## 2023-10-03 NOTE — PROGRESS NOTES
"[facility] Cardiology Daily Note       LOS: 2 days   Patient Care Team:  Denis Lr MD as PCP - General (Family Medicine)    Chief Complaint:  Atrial fibrillation with RVR    Subjective     Subjective: Family would like to consider a potential ECV if no improvement with medication changes. Denies any chest pain, awareness of palpitations, shortness of breath, orthopnea, dizziness, lightheadedness, numbness or tingling. BP remains normotensive. HR continues to be variable in last 24 hours with highest documented HR of 124 bpm.    Review of Systems:   As above.    Medications:  apixaban, 2.5 mg, Oral, BID  bumetanide, 0.5 mg, Intravenous, Q12H  cefuroxime, 250 mg, Oral, Q12H  doxycycline, 100 mg, Oral, Q12H  melatonin, 5 mg, Oral, Nightly  metoprolol tartrate, 50 mg, Oral, Q12H  sennosides-docusate, 1 tablet, Oral, Daily  sodium chloride, 10 mL, Intravenous, Q12H        Objective     Vital Sign Min/Max for last 24 hours  Temp  Min: 97.6 °F (36.4 °C)  Max: 98.9 °F (37.2 °C)   BP  Min: 99/82  Max: 138/98   Pulse  Min: 75  Max: 124   Resp  Min: 16  Max: 18   SpO2  Min: 91 %  Max: 98 %   Flow (L/min)  Min: 2  Max: 3   Weight  Min: 74.8 kg (164 lb 14.4 oz)  Max: 81.6 kg (180 lb)      Intake/Output Summary (Last 24 hours) at 10/3/2023 0929  Last data filed at 10/3/2023 0900  Gross per 24 hour   Intake 720 ml   Output 180 ml   Net 540 ml        Flowsheet Rows      Flowsheet Row First Filed Value   Admission Height 162.6 cm (64\") Documented at 10/01/2023 1305   Admission Weight 81.6 kg (180 lb) Documented at 10/01/2023 1305            Physical Exam:    General: Alert and oriented. Resting in bed. No acute distress.  Cardiovascular: Tachycardic. Irregularly irregular. Heart has a nondisplaced focal PMI.  Grade 2/6 systolic ejection murmur with midsystolic murmur at RUSB. No, gallop or rub.  Lungs: Clear without rales or wheezes. Equal expansion is noted.   Abdomen: Soft, nontender.  Extremities: Show no " edema.  DP and PT pulses 2+ and equal.  Skin: warm and dry.     Results Review:    I reviewed the patient's new clinical results.    Chest XR (10/1/2023): Groundglass opacities throughout the right lung compatible with infection.     EKG (10/3/2023):      Tele: Irregularly, irregular, -128 bpm    Labs:    Results from last 7 days   Lab Units 10/03/23  0346 10/02/23  1045 10/02/23  0436 10/01/23  1307   SODIUM mmol/L 140 145 144 146*   POTASSIUM mmol/L 3.3* 3.8 3.8 3.7   CHLORIDE mmol/L 101 101 103 104   CO2 mmol/L 30.0* 31.0* 28.0 29.0   BUN mg/dL 39* 41* 43* 38*   CREATININE mg/dL 1.38* 1.53* 1.57* 1.55*   CALCIUM mg/dL 8.5 9.4 9.0 9.2   BILIRUBIN mg/dL  --   --   --  0.5   ALK PHOS U/L  --   --   --  119*   ALT (SGPT) U/L  --   --   --  53*   AST (SGOT) U/L  --   --   --  37*   GLUCOSE mg/dL 98 130* 98 124*     Results from last 7 days   Lab Units 10/03/23  0346 10/02/23  0436 10/01/23  1307   WBC 10*3/mm3 7.30 9.27 11.46*   HEMOGLOBIN g/dL 9.7* 9.6* 10.3*   HEMATOCRIT % 31.5* 30.8* 33.0*   PLATELETS 10*3/mm3 323 325 412     Lab Results   Component Value Date    TROPONINT 93 (C) 10/01/2023    TROPONINT 112 (C) 10/01/2023    TROPONINT <0.010 04/02/2020     No results found for: CHOL  No results found for: TRIG  No results found for: HDL  No components found for: LDLCALC  No results found for: INR, PROTIME    Echo (10/2/2023):   Results for orders placed during the hospital encounter of 10/01/23    Adult Transthoracic Echo Complete W/ Cont if Necessary Per Protocol    Interpretation Summary    Left ventricular systolic function is normal. Left ventricular ejection fraction appears to be 51 - 55%.    Left ventricular wall thickness is consistent with mild concentric hypertrophy.    Mild aortic valve regurgitation is present.    Moderate to severe aortic valve stenosis is present.  Gradients are variable secondary to atrial fibrillation.  Mean  gradient average 30-35 mmHg    Moderate to severe tricuspid valve  regurgitation is present.    Estimated right ventricular systolic pressure from tricuspid regurgitation is mildly elevated (35-45 mmHg).    There is a left pleural effusion.    Ejection Fraction: 51-55%    Assessment   Assessment:  Atrial fibrillation with RVR  CHADSVASC 8 on chronic Eliquis  Diltiazem gtt d/c on (10/1)  Amiodarone gtt d/c yesterday evening (10/2)   Metoprolol tartrate 50 mg this morning, then 25 mg x1 late this morning. Scheduled to start metoprolol 100 mg BID with first dose tonight.  Patient received digoxin 500 mcg x 1 late yesterday morning.  Family now would be interested in ECV if medication therapy does not work.   Patient continues to remain to be in atrial fibrillation with RVR.  Current HR: 126 bpm  Not rate controlled.  5 mg IV metoprolol given at at 3:15 PM today without improvement in HR.  Patient not symptomatic or aware of being in afib.  Elevated troponin  112,93, trending down. EKG without evidence of ACS. Likely demand ischemia, given afib RVR.  No active chest pain.  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  ProBNP > 19,000. CXR concerning for pulmonary edema.  Needs low-dose empiric diuresis and defer invasive studies; doubt candidate for TAVR  Echo reveals left pleural effusion.  Patient has received IV Bumex 0.5 mg x 3 since admission.  Patient breathing 93% SpO2 on 2.5 L/min NC.  Patient appears to be diuresing well with net 860 ml output today.  Aortic regurgitation, mild/Aortic stenosis, moderate to severe/Tricuspid regurgitation, moderate to severe  Mean gradient 9 mmHg on previous echo in 2020.   Suspect progressive AS although murmur may be exacerbated by significant anemia  Echo yesterday reveals worsening AS with now moderate to severe AS with Ao mean PG of 30-35 mmHg.  Hypertension, probably essential  Hypotensive overnight on 10/1/2023 with elevated HR. BP currently stable.   Concur with  holding home medications and may need to be placed on low-dose midodrine 2.5-5 mg TID during current hospitalization.  BP normotensive over the last 24 hours.  Current BP: 118/68 mmHg  CKD III with MEÑO  Creatinine 1.57 on admission  Creatinine slightly improved today at 1.38  No urinalysis or urine studies to review and no prior renal ultrasound available  Current baseline serum creatinine unknown  H/o DVT after hip fracture, 2020  Anemia  H/H 9.6, 30.8 on admission  Recommend further workup/treatment  Currently H/H 9.7, 31.5  Iron study revealing iron deficiency anemia  Hypokalemia  K 3.3 today.      Plan:  Increase metoprolol tartrate to 100 mg BID and restart amiodarone at 200 mg PO BI  for improved rate control.  ECV again discussed today with patient and her family and they are now would consider a future ECV if medication therapy fails to control a fib.  NPO after midnight for possible ECV tomorrow.  Replace potassium per electrolyte replacement protocol.   Continue IV Bumex 0.5 mg BID as renal function has somewhat improved.   Continue strict I/Os and daily weights.   Continue Eliquis 2.5 mg BID for stroke and DVT/PE prophylaxis.  ECV again discussed today with patient and her family and they are now would consider a future ECV if medication therapy fails to control a fib.      Whit Irizarry PA-C  10/03/23  09:29 EDT

## 2023-10-03 NOTE — PLAN OF CARE
Goal Outcome Evaluation:  Plan of Care Reviewed With: patient, daughter           Outcome Evaluation: OT eval complete. Pt presents with soa with exertion, weakness, confusion, and overall decline in act moira.  Pt setup for supine to sit, cga for sts and mob with rw, min assist for donning socks, and setup for grooming.  Recommend IPOT and return to facility with OT services through .      Anticipated Discharge Disposition (OT): home with home health

## 2023-10-03 NOTE — PLAN OF CARE
Goal Outcome Evaluation:  Plan of Care Reviewed With: patient, family           Outcome Evaluation: PT evaluation completed. Pt presents below baseline function d/t SOA and deficits in balance, functional endurance, and activity tolerance. Pt ambulated 100 ft w/ CGA and FWW w/ further distance limited by SOA. Pt would benefit from skilled IP PT. Recommend return to facility w/ HHPT at d/c.      Anticipated Discharge Disposition (PT): other (see comments), home with home health (return to facility w/ HHPT)

## 2023-10-03 NOTE — THERAPY EVALUATION
Patient Name: Amanda Glover  : 10/31/1932    MRN: [id number]                              Today's Date: 10/3/2023       Admit Date: 10/1/2023    Visit Dx:     ICD-10-CM ICD-9-CM   1. Atrial fibrillation with rapid ventricular response  I48.91 427.31   2. Elevated troponin  R79.89 790.6   3. Shortness of breath  R06.02 786.05   4. Abnormal chest x-ray  R93.89 793.2   5. Acute congestive heart failure, unspecified heart failure type  I50.9 428.0   6. Elevated serum creatinine  R79.89 790.99   7. New onset a-fib  I48.91 427.31   8. Acute deep vein thrombosis (DVT) of popliteal vein of both lower extremities  I82.433 453.41   9. Aortic valve stenosis, mild  I35.0 424.1   10. Atrial fibrillation with RVR  I48.91 427.31   11. History of DVT (deep vein thrombosis) -   Z86.718 V12.51   12. Elevated transaminase level  R74.01 790.4   13. Hypoxia  R09.02 799.02     Patient Active Problem List   Diagnosis    New onset a-fib    Acute deep vein thrombosis (DVT) of popliteal vein of both lower extremities    Hypertension    Aortic valve stenosis, mild    Atrial fibrillation with RVR    Elevated troponin    History of DVT (deep vein thrombosis) -     Alzheimer's dementia    CKD (chronic kidney disease), stage III    Elevated serum creatinine    Elevated transaminase level    Community acquired pneumonia of right lung    Hypoxia     Past Medical History:   Diagnosis Date    Aortic valve stenosis     Atrial fibrillation     Deep vein thrombosis     Dementia     Hyperlipidemia     Hypertension     Murmur     Osteoporosis      Past Surgical History:   Procedure Laterality Date    APPENDECTOMY      FEMUR FRACTURE SURGERY Left 2020    HERNIA REPAIR      HYSTERECTOMY        General Information       Row Name 10/03/23 0915          OT Time and Intention    Document Type evaluation  -SW     Mode of Treatment occupational therapy  -SW       Row Name 10/03/23 0915          General Information    Patient Profile Reviewed yes  -SW      Prior Level of Function independent:;all household mobility;feeding;grooming;dressing;min assist:;bathing  -     Existing Precautions/Restrictions fall;oxygen therapy device and L/min  -     Barriers to Rehab cognitive status  -       Row Name 10/03/23 0915          Occupational Profile    Environmental Supports and Barriers (Occupational Profile) LIves in a personal care facility.  Uses RW for mobility.  -       Row Name 10/03/23 0915          Living Environment    People in Home facility resident;alone  Personal care facility.  -       Row Name 10/03/23 0915          Home Main Entrance    Number of Stairs, Main Entrance none  -       Row Name 10/03/23 0915          Cognition    Orientation Status (Cognition) oriented to;person  -       Row Name 10/03/23 0915          Safety Issues, Functional Mobility    Safety Issues Affecting Function (Mobility) awareness of need for assistance;insight into deficits/self-awareness;safety precaution awareness;safety precautions follow-through/compliance  -     Impairments Affecting Function (Mobility) balance;endurance/activity tolerance;shortness of breath;strength  -               User Key  (r) = Recorded By, (t) = Taken By, (c) = Cosigned By      Initials Name Provider Type     Izabela Acuña OT Occupational Therapist                     Mobility/ADL's       Row Name 10/03/23 0915          Bed Mobility    Bed Mobility supine-sit  -     Supine-Sit Wise (Bed Mobility) set up;verbal cues  -     Assistive Device (Bed Mobility) head of bed elevated;bed rails  -       Row Name 10/03/23 0915          Transfers    Transfers bed-chair transfer;sit-stand transfer  -       Row Name 10/03/23 0915          Bed-Chair Transfer    Bed-Chair Wise (Transfers) contact guard;verbal cues  -     Assistive Device (Bed-Chair Transfers) walker, front-wheeled  -       Row Name 10/03/23 0915          Sit-Stand Transfer    Sit-Stand Wise  (Transfers) contact guard;verbal cues  -     Assistive Device (Sit-Stand Transfers) walker, front-wheeled  -       Row Name 10/03/23 0915          Functional Mobility    Functional Mobility- Ind. Level contact guard assist;verbal cues required  -     Functional Mobility- Device walker, front-wheeled  -     Functional Mobility-Distance (Feet) 80  -     Functional Mobility- Safety Issues supplemental O2  -       Row Name 10/03/23 0915          Activities of Daily Living    BADL Assessment/Intervention lower body dressing;grooming  -[facility] Name 10/03/23 0915          Lower Body Dressing Assessment/Training    [place] Level (Lower Body Dressing) lower body dressing skills;socks;minimum assist (75% patient effort)  -     Position (Lower Body Dressing) unsupported sitting  -       Row Name 10/03/23 0915          Grooming Assessment/Training    [place] Level (Grooming) grooming skills;wash face, hands;set up  -     Position (Grooming) supported sitting  -               User Key  (r) = Recorded By, (t) = Taken By, (c) = Cosigned By      Initials Name Provider Type    Izabela Barroso OT Occupational Therapist                   Obj/Interventions       Row Name 10/03/23 0915          Sensory Assessment (Somatosensory)    Sensory Assessment (Somatosensory) UE sensation intact  -[facility] Name 10/03/23 0915          Vision Assessment/Intervention    Visual Impairment/Limitations WFL  [facility] Name 10/03/23 0915          Range of Motion Comprehensive    General Range of Motion bilateral upper extremity ROM L  -[facility] Name 10/03/23 0915          Strength Comprehensive (MMT)    General Manual Muscle Testing (MMT) Assessment upper extremity strength deficits identified  -     Comment, General Manual Muscle Testing (MMT) Assessment RUE 3+/5, LUE 4/5 (family reports hx of injury to RUE)..  -       Row Name 10/03/23 0915          Motor Skills    Motor Skills functional endurance   -     Functional Endurance SOA with activity and mobility.  -       Row Name 10/03/23 0915          Balance    Balance Assessment sitting static balance;sitting dynamic balance;sit to stand dynamic balance;standing static balance;standing dynamic balance  -     Static Sitting Balance standby assist  -     Dynamic Sitting Balance standby assist  -SW     Position, Sitting Balance unsupported;sitting edge of bed  -     Sit to Stand Dynamic Balance contact guard;verbal cues  -SW     Static Standing Balance contact guard;verbal cues  -SW     Dynamic Standing Balance minimal assist;verbal cues  -SW     Position/Device Used, Standing Balance supported;walker, front-wheeled  -     Balance Interventions sitting;standing;sit to stand;supported;static;dynamic;minimal challenge;occupation based/functional task  -               User Key  (r) = Recorded By, (t) = Taken By, (c) = Cosigned By      Initials Name Provider Type    Izabela Barroso OT Occupational Therapist                   Goals/Plan       Row Name 10/03/23 0915          Transfer Goal 1 (OT)    Activity/Assistive Device (Transfer Goal 1, OT) toilet  -SW     [place] Level/Cues Needed (Transfer Goal 1, OT) standby assist  -SW     Time Frame (Transfer Goal 1, OT) long term goal (LTG);by discharge  -     Progress/Outcome (Transfer Goal 1, OT) goal ongoing  -       Row Name 10/03/23 0915          Dressing Goal 1 (OT)    Activity/Device (Dressing Goal 1, OT) lower body dressing  -SW     [place]/Cues Needed (Dressing Goal 1, OT) set-up required  -SW     Time Frame (Dressing Goal 1, OT) long term goal (LTG);by discharge  -     Progress/Outcome (Dressing Goal 1, OT) goal ongoing  -       Row Name 10/03/23 0915          Therapy Assessment/Plan (OT)    Planned Therapy Interventions (OT) activity tolerance training;adaptive equipment training;BADL retraining;functional balance retraining;transfer/mobility retraining;strengthening  exercise;patient/caregiver education/training  -               User Key  (r) = Recorded By, (t) = Taken By, (c) = Cosigned By      Initials Name Provider Type    Izabela Barroso, OT Occupational Therapist                   Clinical Impression       Row Name 10/03/23 0915          Pain Assessment    Pretreatment Pain Rating 0/10 - no pain  -     Posttreatment Pain Rating 0/10 - no pain  -       Row Name 10/03/23 0915          Plan of Care Review    Plan of Care Reviewed With patient;daughter  -     Outcome Evaluation OT eval complete. Pt presents with soa with exertion, weakness, confusion, and overall decline in act moira.  Pt setup for supine to sit, cga for sts and mob with rw, min assist for donning socks, and setup for grooming.  Recommend IPOT and return to facility with OT services through .  -       Row Name 10/03/23 0915          Therapy Assessment/Plan (OT)    Rehab Potential (OT) good, to achieve stated therapy goals  -     Criteria for Skilled Therapeutic Interventions Met (OT) yes;meets criteria;skilled treatment is necessary  -     Therapy Frequency (OT) daily  -       Row Name 10/03/23 0915          Therapy Plan Review/Discharge Plan (OT)    Anticipated Discharge Disposition (OT) home with home health  -       Row Name 10/03/23 0915          Vital Signs    Pre Systolic BP Rehab 106  -SW     Pre Treatment Diastolic BP 76  -SW     Intra Systolic BP Rehab 98  -SW     Intra Treatment Diastolic BP 86  -SW     Pretreatment Heart Rate (beats/min) 115  -SW     Intratreatment Heart Rate (beats/min) 141  -SW     Posttreatment Heart Rate (beats/min) 117  -SW     Pre SpO2 (%) 95  -SW     O2 Delivery Pre Treatment supplemental O2  -SW     Intra SpO2 (%) 87  -SW     O2 Delivery Intra Treatment supplemental O2  -SW     Post SpO2 (%) 94  -SW     O2 Delivery Post Treatment supplemental O2  -SW     Pre Patient Position Supine  -SW     Intra Patient Position Standing  -SW     Post Patient Position  Sitting  -       Row Name 10/03/23 0915          Positioning and Restraints    Pre-Treatment Position in bed  -SW     Post Treatment Position chair  -SW     In Chair notified nsg;reclined;sitting;call light within reach;encouraged to call for assist;exit alarm on;with family/caregiver;waffle cushion;legs elevated  -               User Key  (r) = Recorded By, (t) = Taken By, (c) = Cosigned By      Initials Name Provider Type    Izabela Barroso, BLANCA Occupational Therapist                   Outcome Measures       Row Name 10/03/23 1005          How much help from another is currently needed...    Putting on and taking off regular lower body clothing? 3  -SW     Bathing (including washing, rinsing, and drying) 3  -SW     Toileting (which includes using toilet bed pan or urinal) 3  -SW     Putting on and taking off regular upper body clothing 3  -SW     Taking care of personal grooming (such as brushing teeth) 3  -SW     Eating meals 4  -SW     AM-PAC 6 Clicks Score (OT) 19  -       Row Name 10/03/23 0950          How much help from another person do you currently need...    Turning from your back to your side while in flat bed without using bedrails? 3  -LH     Moving from lying on back to sitting on the side of a flat bed without bedrails? 3  -LH     Moving to and from a bed to a chair (including a wheelchair)? 3  -LH     Standing up from a chair using your arms (e.g., wheelchair, bedside chair)? 3  -LH     Climbing 3-5 steps with a railing? 2  -LH     To walk in hospital room? 3  -LH     AM-PAC 6 Clicks Score (PT) 17  -LH     Highest level of mobility 5 --> Static standing  -       Row Name 10/03/23 1005 10/03/23 0950       Functional Assessment    Outcome Measure Options AM-PAC 6 Clicks Daily Activity (OT)  - AM-PAC 6 Clicks Basic Mobility (PT)  -              User Key  (r) = Recorded By, (t) = Taken By, (c) = Cosigned By      Initials Name Provider Type    Izabela Barroso OT Occupational Therapist      Fara Garcia, PT Physical Therapist                    Occupational Therapy Education       Title: PT OT SLP Therapies (In Progress)       Topic: Occupational Therapy (In Progress)       Point: ADL training (In Progress)       Description:   Instruct learner(s) on proper safety adaptation and remediation techniques during self care or transfers.   Instruct in proper use of assistive devices.                  Learning Progress Summary             Patient Acceptance, E,D, NR by  at 10/3/2023 1006   Family Acceptance, E,D, NR by  at 10/3/2023 1006                         Point: Home exercise program (Not Started)       Description:   Instruct learner(s) on appropriate technique for monitoring, assisting and/or progressing therapeutic exercises/activities.                  Learner Progress:  Not documented in this visit.              Point: Precautions (In Progress)       Description:   Instruct learner(s) on prescribed precautions during self-care and functional transfers.                  Learning Progress Summary             Patient Acceptance, E,D, NR by  at 10/3/2023 1006   Family Acceptance, E,D, NR by  at 10/3/2023 1006                         Point: Body mechanics (Not Started)       Description:   Instruct learner(s) on proper positioning and spine alignment during self-care, functional mobility activities and/or exercises.                  Learner Progress:  Not documented in this visit.                              User Key       Initials Effective Dates Name Provider Type Discipline     06/16/21 -  Izabela Acuña OT Occupational Therapist OT                  OT Recommendation and Plan  Planned Therapy Interventions (OT): activity tolerance training, adaptive equipment training, BADL retraining, functional balance retraining, transfer/mobility retraining, strengthening exercise, patient/caregiver education/training  Therapy Frequency (OT): daily  Plan of Care Review  Plan of Care Reviewed With:  patient, daughter  Outcome Evaluation: OT eval complete. Pt presents with soa with exertion, weakness, confusion, and overall decline in act moira.  Pt setup for supine to sit, cga for sts and mob with rw, min assist for donning socks, and setup for grooming.  Recommend IPOT and return to facility with OT services through .     Time Calculation:   Evaluation Complexity (OT)  Review Occupational Profile/Medical/Therapy History Complexity: brief/low complexity  Assessment, Occupational Performance/Identification of Deficit Complexity: 3-5 performance deficits  Clinical Decision Making Complexity (OT): problem focused assessment/low complexity  Overall Complexity of Evaluation (OT): low complexity     Time Calculation- OT       Row Name 10/03/23 0915             Time Calculation- OT    OT Start Time 0915  -SW      OT Received On 10/03/23  -SW      OT Goal Re-Cert Due Date 10/13/23  -SW         Timed Charges    09597 - OT Self Care/Mgmt Minutes 15  -SW         Untimed Charges    OT Eval/Re-eval Minutes 38  -SW         Total Minutes    Timed Charges Total Minutes 15  -SW      Untimed Charges Total Minutes 38  -SW       Total Minutes 53  -SW                User Key  (r) = Recorded By, (t) = Taken By, (c) = Cosigned By      Initials Name Provider Type    SW Izabela Acuña OT Occupational Therapist                  Therapy Charges for Today       Code Description Service Date Service Provider Modifiers Qty    [id number]  OT SELF CARE/MGMT/TRAIN EA 15 MIN 10/3/2023 Izabela Acuña OT GO 1    [id number]  OT EVAL LOW COMPLEXITY 3 10/3/2023 Izabela Acuña OT GO 1                 Izabela Acuña OT  10/3/2023

## 2023-10-03 NOTE — PROGRESS NOTES
[facility] Medicine Services  PROGRESS NOTE    Patient Name: Amanda Glover  : 10/31/1932  MRN: [id number]    Date of Admission: 10/1/2023  Primary Care Physician: Denis Lr MD    Subjective   Subjective     CC:  Shortness of breath, lethargy    HPI:  Patient resting comfortably in her chair this morning.  Denies any chest pain or shortness of breath.  Family at bedside notes her heart rate has periodically become elevated in the 110s to the 90s.  Medical power of  at bedside indicates that they wish to continue with medical therapy is much as possible and only proceed with cardioversion if absolutely necessary.      Objective   Objective     Vital Signs:   Temp:  [97.5 °F (36.4 °C)-98.9 °F (37.2 °C)] 97.5 °F (36.4 °C)  Heart Rate:  [] 115  Resp:  [16-18] 18  BP: ()/(67-98) 118/68  Flow (L/min):  [2-3] 2.5     Physical Exam:  General appearance: alert, awake, no acute distress   Cardiovascular: Irregularly irregular, systolic murmur, radial pulse full 2/4 BL   Respiratory: CTAB, no crackles or wheezes, oxygenating well on 2 L nasal cannula  Abdomen: soft, non-tender, no organomegaly, bowel sounds normoactive      Results Reviewed:  LAB RESULTS:      Lab 10/03/23  0346 10/02/23  0436 10/01/23  1307   WBC 7.30 9.27 11.46*   HEMOGLOBIN 9.7* 9.6* 10.3*   HEMATOCRIT 31.5* 30.8* 33.0*   PLATELETS 323 325 412   NEUTROS ABS 5.50  --  9.28*   IMMATURE GRANS (ABS) 0.08*  --  0.07*   LYMPHS ABS 0.73  --  1.18   MONOS ABS 0.60  --  0.73   EOS ABS 0.35  --  0.18   MCV 86.3 88.0 88.0   PROCALCITONIN  --   --  0.10   LACTATE  --   --  1.8         Lab 10/03/23  0346 10/02/23  1045 10/02/23  0436 10/01/23  1307   SODIUM 140 145 144 146*   POTASSIUM 3.3* 3.8 3.8 3.7   CHLORIDE 101 101 103 104   CO2 30.0* 31.0* 28.0 29.0   ANION GAP 9.0 13.0 13.0 13.0   BUN 39* 41* 43* 38*   CREATININE 1.38* 1.53* 1.57* 1.55*   EGFR 36.4* 32.2* 31.2* 31.7*   GLUCOSE 98 130* 98 124*   CALCIUM 8.5  9.4 9.0 9.2   MAGNESIUM 2.1  --   --  2.1   HEMOGLOBIN A1C  --   --  5.40  --    TSH  --   --   --  0.927         Lab 10/01/23  1307   TOTAL PROTEIN 6.9   ALBUMIN 3.4*   GLOBULIN 3.5   ALT (SGPT) 53*   AST (SGOT) 37*   BILIRUBIN 0.5   ALK PHOS 119*         Lab 10/01/23  1456 10/01/23  1307   PROBNP  --  19,889.0*   HSTROP T 93* 112*             Lab 10/02/23  0436   IRON 30*   IRON SATURATION (TSAT) 15*   TIBC 197*   TRANSFERRIN 132*         Brief Urine Lab Results       None            Microbiology Results Abnormal       Procedure Component Value - Date/Time    Blood Culture - Blood, Hand, Right [[id number]]  (Normal) Collected: 10/01/23 1458    Lab Status: Preliminary result Specimen: Blood from Hand, Right Updated: 10/02/23 1531     Blood Culture No growth at 24 hours    Blood Culture - Blood, Arm, Right [[id number]]  (Normal) Collected: 10/01/23 1400    Lab Status: Preliminary result Specimen: Blood from Arm, Right Updated: 10/02/23 1531     Blood Culture No growth at 24 hours    COVID PRE-OP / PRE-PROCEDURE SCREENING ORDER (NO ISOLATION) - Swab, Nasopharynx [[id number]]  (Normal) Collected: 10/01/23 1457    Lab Status: Final result Specimen: Swab from Nasopharynx Updated: 10/01/23 1991    Narrative:      The following orders were created for panel order COVID PRE-OP / PRE-PROCEDURE SCREENING ORDER (NO ISOLATION) - Swab, Nasopharynx.  Procedure                               Abnormality         Status                     ---------                               -----------         ------                     Respiratory Panel PCR w/...[[id number]]  Normal              Final result                 Please view results for these tests on the individual orders.    Respiratory Panel PCR w/COVID-19(SARS-CoV-2) PHI/[facility]/[place]/[facility] In-House, NP Swab in [place], 3-4 HR TAT - Swab, Nasopharynx [[id number]]  (Normal) Collected: 10/01/23 1457    Lab Status: Final result Specimen: Swab from Nasopharynx Updated: 10/01/23 6766      ADENOVIRUS, PCR Not Detected     Coronavirus 229E Not Detected     Coronavirus HKU1 Not Detected     Coronavirus NL63 Not Detected     Coronavirus OC43 Not Detected     COVID19 Not Detected     Human Metapneumovirus Not Detected     Human Rhinovirus/Enterovirus Not Detected     Influenza A PCR Not Detected     Influenza B PCR Not Detected     Parainfluenza Virus 1 Not Detected     Parainfluenza Virus 2 Not Detected     Parainfluenza Virus 3 Not Detected     Parainfluenza Virus 4 Not Detected     RSV, PCR Not Detected     Bordetella pertussis pcr Not Detected     Bordetella parapertussis PCR Not Detected     Chlamydophila pneumoniae PCR Not Detected     Mycoplasma pneumo by PCR Not Detected    Narrative:      In the setting of a positive respiratory panel with a viral infection PLUS a negative procalcitonin without other underlying concern for bacterial infection, consider observing off antibiotics or discontinuation of antibiotics and continue supportive care. If the respiratory panel is positive for atypical bacterial infection (Bordetella pertussis, Chlamydophila pneumoniae, or Mycoplasma pneumoniae), consider antibiotic de-escalation to target atypical bacterial infection.            Adult Transthoracic Echo Complete W/ Cont if Necessary Per Protocol    Result Date: 10/2/2023    Left ventricular systolic function is normal. Left ventricular ejection fraction appears to be 51 - 55%.   Left ventricular wall thickness is consistent with mild concentric hypertrophy.   Mild aortic valve regurgitation is present.   Moderate to severe aortic valve stenosis is present.  Gradients are variable secondary to atrial fibrillation.  Mean  gradient average 30-35 mmHg   Moderate to severe tricuspid valve regurgitation is present.   Estimated right ventricular systolic pressure from tricuspid regurgitation is mildly elevated (35-45 mmHg).   There is a left pleural effusion.      Results for orders placed during the hospital  encounter of 10/01/23    Adult Transthoracic Echo Complete W/ Cont if Necessary Per Protocol    Interpretation Summary    Left ventricular systolic function is normal. Left ventricular ejection fraction appears to be 51 - 55%.    Left ventricular wall thickness is consistent with mild concentric hypertrophy.    Mild aortic valve regurgitation is present.    Moderate to severe aortic valve stenosis is present.  Gradients are variable secondary to atrial fibrillation.  Mean  gradient average 30-35 mmHg    Moderate to severe tricuspid valve regurgitation is present.    Estimated right ventricular systolic pressure from tricuspid regurgitation is mildly elevated (35-45 mmHg).    There is a left pleural effusion.      Current medications:  Scheduled Meds:apixaban, 2.5 mg, Oral, BID  bumetanide, 0.5 mg, Intravenous, Q12H  cefuroxime, 250 mg, Oral, Q12H  doxycycline, 100 mg, Oral, Q12H  melatonin, 5 mg, Oral, Nightly  metoprolol tartrate, 75 mg, Oral, Q12H  sennosides-docusate, 1 tablet, Oral, Daily  sodium chloride, 10 mL, Intravenous, Q12H      Continuous Infusions:   PRN Meds:.  acetaminophen    calcium carbonate    famotidine    nitroglycerin    Potassium Replacement - Follow Nurse / BPA Driven Protocol    prochlorperazine **OR** prochlorperazine    sodium chloride    sodium chloride    sodium chloride    Assessment & Plan   Assessment & Plan     Active Hospital Problems    Diagnosis  POA    Atrial fibrillation with RVR [I48.91]  Yes    Elevated troponin [R79.89]  Yes    History of DVT (deep vein thrombosis) - 2020 [Z86.718]  Not Applicable    Alzheimer's dementia [G30.9, F02.80]  Unknown    CKD (chronic kidney disease), stage III [N18.30]  Unknown    Elevated serum creatinine [R79.89]  Unknown    Elevated transaminase level [R74.01]  Unknown    Community acquired pneumonia of right lung [J18.9]  Unknown    Hypoxia [R09.02]  Unknown    Aortic valve stenosis, mild [I35.0]  Yes    Hypertension [I10]  Yes      Resolved  Hospital Problems   No resolved problems to display.        Brief Hospital Course to date:  Amanda Glover is a 89 y.o. female with past medical history significant for hypertension, paroxysmal atrial fibrillation on Eliquis, aortic stenosis, CKD stage III, VTE, Alzheimer's dementia, who was admitted for atrial fibrillation with RVR.  Cardiology consulted, offered cardioversion but family elects for medical management.     Atrial fibrillation with RVR  Elevated troponin due to demand ischemia  Left pleural effusion  Moderate-Severe aortic stenosis  Moderate-Severe Tricuspid Regurgitation  History of hypertension  Hx PAF on Eliquis   -Cardiology consulted; rate control not optimized  -Increased metoprolol from 50mg to 100mg BID               -Amiodarone infusion 10/2, add 200mg PO BID               -Digoxin 500 load 10/2              -Monitor on telemetry   -NPO after midnight for consideration of cardioversion should pharmacotherapy fail  -TTE reviewed; AS progressed, preserved EF diurese with bumex, appears euvolemic   -Strict I/Os  -Add midodrine if hypotension occurs; hold antihypertensives at this time  -Continue Eliquis 2.5mg BID   -Optimize electrolytes   -Monitor on telemetry     Pneumonia, RUL   Acute hypoxia   -Stable  -Continue doxycycline and ceftin   -RT O2 per protocol; wean as tolerated      MEÑO on CKD III  -Stable; monitor renal function while diuresing  -BMP in AM   -Avoid nephrotoxic agents      Hypernatremia  -resolved      Acute anemia on anemia of chronic disease  -Monitor, no active signs of bleeding   -s/p Ferric gluconate infusion 10/2      Expected Discharge Location and Transportation: [facility]  Expected Discharge   Expected Discharge Date: 10/5/2023; Expected Discharge Time:      DVT prophylaxis:  Medical DVT prophylaxis orders are present.     AM-PAC 6 Clicks Score (PT): 18 (10/03/23 1116)    CODE STATUS:   Code Status and Medical Interventions:   Ordered at: 10/01/23 0012     Medical  Intervention Limits:    NO intubation (DNI)     Level Of Support Discussed With:    Health Care Surrogate     Code Status (Patient has no pulse and is not breathing):    No CPR (Do Not Attempt to Resuscitate)     Medical Interventions (Patient has pulse or is breathing):    Limited Support       John Israel, DO  10/03/23

## 2023-10-03 NOTE — PLAN OF CARE
Goal Outcome Evaluation:  Plan of Care Reviewed With: patient        Progress: improving  Outcome Evaluation: Controlled afib on monitor, BP is improving, no longer on gtts medication, 3L O2  NC sats > 92%, pt confused at her base line, pt slept well most of the night, no new concern, will continue to monitor.         Problem: Adult Inpatient Plan of Care  Goal: Absence of Hospital-Acquired Illness or Injury  Intervention: Identify and Manage Fall Risk  Recent Flowsheet Documentation  Taken 10/2/2023 2000 by Chip King RN  Safety Promotion/Fall Prevention: activity supervised  Intervention: Prevent Skin Injury  Recent Flowsheet Documentation  Taken 10/2/2023 2000 by Chip King RN  Body Position: position changed independently  Intervention: Prevent and Manage VTE (Venous Thromboembolism) Risk  Recent Flowsheet Documentation  Taken 10/2/2023 2000 by Chip King RN  Activity Management: up to bedside commode     Problem: Adult Inpatient Plan of Care  Goal: Absence of Hospital-Acquired Illness or Injury  Intervention: Prevent Skin Injury  Recent Flowsheet Documentation  Taken 10/2/2023 2000 by Chip King RN  Body Position: position changed independently     Problem: Adult Inpatient Plan of Care  Goal: Absence of Hospital-Acquired Illness or Injury  Intervention: Prevent and Manage VTE (Venous Thromboembolism) Risk  Recent Flowsheet Documentation  Taken 10/2/2023 2000 by Chip King RN  Activity Management: up to bedside commode

## 2023-10-03 NOTE — THERAPY EVALUATION
Patient Name: Amanda Glover  : 10/31/1932    MRN: [id number]                              Today's Date: 10/3/2023       Admit Date: 10/1/2023    Visit Dx:     ICD-10-CM ICD-9-CM   1. Atrial fibrillation with rapid ventricular response  I48.91 427.31   2. Elevated troponin  R79.89 790.6   3. Shortness of breath  R06.02 786.05   4. Abnormal chest x-ray  R93.89 793.2   5. Acute congestive heart failure, unspecified heart failure type  I50.9 428.0   6. Elevated serum creatinine  R79.89 790.99   7. New onset a-fib  I48.91 427.31   8. Acute deep vein thrombosis (DVT) of popliteal vein of both lower extremities  I82.433 453.41   9. Aortic valve stenosis, mild  I35.0 424.1   10. Atrial fibrillation with RVR  I48.91 427.31   11. History of DVT (deep vein thrombosis) -   Z86.718 V12.51   12. Elevated transaminase level  R74.01 790.4   13. Hypoxia  R09.02 799.02     Patient Active Problem List   Diagnosis    New onset a-fib    Acute deep vein thrombosis (DVT) of popliteal vein of both lower extremities    Hypertension    Aortic valve stenosis, mild    Atrial fibrillation with RVR    Elevated troponin    History of DVT (deep vein thrombosis) -     Alzheimer's dementia    CKD (chronic kidney disease), stage III    Elevated serum creatinine    Elevated transaminase level    Community acquired pneumonia of right lung    Hypoxia     Past Medical History:   Diagnosis Date    Aortic valve stenosis     Atrial fibrillation     Deep vein thrombosis     Dementia     Hyperlipidemia     Hypertension     Murmur     Osteoporosis      Past Surgical History:   Procedure Laterality Date    APPENDECTOMY      FEMUR FRACTURE SURGERY Left 2020    HERNIA REPAIR      HYSTERECTOMY        General Information       Row Name 10/03/23 0933          Physical Therapy Time and Intention    Document Type evaluation  -     Mode of Treatment physical therapy  -       Row Name 10/03/23 0933          General Information    Patient Profile  Reviewed yes  -     Prior Level of Function independent:;all household mobility;gait;transfer;bed mobility  Ind w/ FWW at baseline. Family reports no falls  -     Existing Precautions/Restrictions fall;oxygen therapy device and L/min  -     Barriers to Rehab medically complex;cognitive status  -       Row Name 10/03/23 0933          Living Environment    People in Home facility resident  -       Row Name 10/03/23 0933          Home Main Entrance    Number of Stairs, Main Entrance none  -       Row Name 10/03/23 0933          Stairs Within Home, Primary    Number of Stairs, Within Home, Primary none  -[facility] Name 10/03/23 0933          Cognition    Orientation Status (Cognition) oriented to;person;disoriented to;place;situation;time  -       Row Name 10/03/23 0933          Safety Issues, Functional Mobility    Safety Issues Affecting Function (Mobility) insight into deficits/self-awareness;safety precaution awareness  -     Impairments Affecting Function (Mobility) balance;endurance/activity tolerance;shortness of breath  -               User Key  (r) = Recorded By, (t) = Taken By, (c) = Cosigned By      Initials Name Provider Type     Fara Garcia, PT Physical Therapist                   Mobility       Row Name 10/03/23 0937          Bed Mobility    Bed Mobility supine-sit  -     Supine-Sit [place] (Bed Mobility) set up;verbal cues  -     Assistive Device (Bed Mobility) head of bed elevated  -     Comment, (Bed Mobility) VCs for sequencing  -       Row Name 10/03/23 0937          Transfers    Comment, (Transfers) VCs for hand placement and sequencing. Cues to reach back to chair before sitting. No LOB noted  -       Row Name 10/03/23 0937          Sit-Stand Transfer    Sit-Stand [place] (Transfers) contact guard;verbal cues  -     Assistive Device (Sit-Stand Transfers) walker, front-wheeled  -       Row Name 10/03/23 0937          Gait/Stairs (Locomotion)     [place] Level (Gait) contact guard;verbal cues  -     Assistive Device (Gait) walker, front-wheeled  -     Distance in Feet (Gait) 100  -     Deviations/Abnormal Patterns (Gait) bilateral deviations;roman decreased;gait speed decreased;stride length decreased  -     Bilateral Gait Deviations forward flexed posture  -     Comment, (Gait/Stairs) Pt demo step through gait pattern and decreased speed. Cues for pacing d/t SOA. No overt LOB noted. Distance limited by SOA and fatigue.  -               User Key  (r) = Recorded By, (t) = Taken By, (c) = Cosigned By      Initials Name Provider Type     Fara Garcia PT Physical Therapist                   Obj/Interventions       Row Name 10/03/23 0942          Range of Motion Comprehensive    General Range of Motion bilateral lower extremity ROM WFL  -       Row Name 10/03/23 0942          Strength Comprehensive (MMT)    General Manual Muscle Testing (MMT) Assessment lower extremity strength deficits identified  -     Comment, General Manual Muscle Testing (MMT) Assessment B LE grossly 4/5  -       Row Name 10/03/23 0942          Motor Skills    Motor Skills functional endurance  -     Functional Endurance Increased SOA w/ mobility on 2.5 L O2 NC  -       Row Name 10/03/23 0942          Balance    Balance Assessment sitting static balance;sitting dynamic balance;sit to stand dynamic balance;standing static balance;standing dynamic balance  -     Static Sitting Balance standby assist  -     Dynamic Sitting Balance standby assist  -     Position, Sitting Balance unsupported;sitting edge of bed  -     Sit to Stand Dynamic Balance contact guard;verbal cues  -     Static Standing Balance contact guard;verbal cues  -     Dynamic Standing Balance contact guard;verbal cues  -     Position/Device Used, Standing Balance supported;walker, front-wheeled  -     Comment, Balance No LOB  -       Row Name 10/03/23 0942          Sensory  Assessment (Somatosensory)    Sensory Assessment (Somatosensory) LE sensation intact  -               User Key  (r) = Recorded By, (t) = Taken By, (c) = Cosigned By      Initials Name Provider Type     Fara Garcia, PT Physical Therapist                   Goals/Plan       Row Name 10/03/23 0949          Bed Mobility Goal 1 (PT)    Activity/Assistive Device (Bed Mobility Goal 1, PT) sit to supine/supine to sit  -     [place] Level/Cues Needed (Bed Mobility Goal 1, PT) standby assist  -     Time Frame (Bed Mobility Goal 1, PT) long term goal (LTG);10 days  -[facility] Name 10/03/23 0949          Transfer Goal 1 (PT)    Activity/Assistive Device (Transfer Goal 1, PT) sit-to-stand/stand-to-sit;bed-to-chair/chair-to-bed  -     [place] Level/Cues Needed (Transfer Goal 1, PT) standby assist  -     Time Frame (Transfer Goal 1, PT) long term goal (LTG);10 days  -[facility] Name 10/03/23 0949          Gait Training Goal 1 (PT)    Activity/Assistive Device (Gait Training Goal 1, PT) gait (walking locomotion);assistive device use  -     [place] Level (Gait Training Goal 1, PT) standby assist  -     Distance (Gait Training Goal 1, PT) 200  -LH     Time Frame (Gait Training Goal 1, PT) long term goal (LTG);10 days  -[facility] Name 10/03/23 0949          Therapy Assessment/Plan (PT)    Planned Therapy Interventions (PT) balance training;bed mobility training;gait training;home exercise program;neuromuscular re-education;patient/family education;postural re-education;ROM (range of motion);strengthening;stretching;transfer training  -               User Key  (r) = Recorded By, (t) = Taken By, (c) = Cosigned By      Initials Name Provider Type    Fara Mc PT Physical Therapist                   Clinical Impression       Row Name 10/03/23 0944          Pain    Pretreatment Pain Rating 0/10 - no pain  -     Posttreatment Pain Rating 0/10 - no pain  -[facility] Name 10/03/23 0944           Plan of Care Review    Plan of Care Reviewed With patient;family  -     Outcome Evaluation PT evaluation completed. Pt presents below baseline function d/t SOA and deficits in balance, functional endurance, and activity tolerance. Pt ambulated 100 ft w/ CGA and FWW w/ further distance limited by SOA. Pt would benefit from skilled IP PT. Recommend return to facility w/ HHPT at d/c.  -       Row Name 10/03/23 0944          Therapy Assessment/Plan (PT)    Patient/Family Therapy Goals Statement (PT) to return home  -     Rehab Potential (PT) good, to achieve stated therapy goals  -     Criteria for Skilled Interventions Met (PT) yes;meets criteria;skilled treatment is necessary  -     Therapy Frequency (PT) daily  -       Row Name 10/03/23 0944          Vital Signs    Pre Systolic BP Rehab 98  -     Pre Treatment Diastolic BP 86  -     Pretreatment Heart Rate (beats/min) 115  -     Intratreatment Heart Rate (beats/min) 147  -     Posttreatment Heart Rate (beats/min) 113  -     O2 Delivery Pre Treatment supplemental O2  -     Intra SpO2 (%) 89  -     O2 Delivery Intra Treatment supplemental O2  -     Post SpO2 (%) 95  -     O2 Delivery Post Treatment supplemental O2  -     Pre Patient Position Supine  -     Intra Patient Position Standing  -     Post Patient Position Sitting  -       Row Name 10/03/23 0944          Positioning and Restraints    Pre-Treatment Position in bed  -     Post Treatment Position chair  -     In Chair reclined;sitting;call light within reach;encouraged to call for assist;exit alarm on;with family/caregiver;waffle cushion;legs elevated;notified [facility]               User Key  (r) = Recorded By, (t) = Taken By, (c) = Cosigned By      Initials Name Provider Type     Fara Garcia, PT Physical Therapist                   Outcome Measures       Row Name 10/03/23 0950          How much help from another person do you currently need...    Turning  from your back to your side while in flat bed without using bedrails? 3  -LH     Moving from lying on back to sitting on the side of a flat bed without bedrails? 3  -LH     Moving to and from a bed to a chair (including a wheelchair)? 3  -LH     Standing up from a chair using your arms (e.g., wheelchair, bedside chair)? 3  -LH     Climbing 3-5 steps with a railing? 2  -LH     To walk in hospital room? 3  -     AM-PAC 6 Clicks Score (PT) 17  -     Highest level of mobility 5 --> Static standing  -       Row Name 10/03/23 0950          Functional Assessment    Outcome Measure Options AM-PAC 6 Clicks Basic Mobility (PT)  -               User Key  (r) = Recorded By, (t) = Taken By, (c) = Cosigned By      Initials Name Provider Type     Fara Garcia, PT Physical Therapist                                 Physical Therapy Education       Title: PT OT SLP Therapies (In Progress)       Topic: Physical Therapy (In Progress)       Point: Mobility training (Done)       Learning Progress Summary             Patient Acceptance, E, VU,NR by  at 10/3/2023 0951    Comment: benefit of OOB mobility and dc planning   Family Acceptance, E, VU,NR by  at 10/3/2023 0951    Comment: benefit of OOB mobility and dc planning                         Point: Home exercise program (Not Started)       Learner Progress:  Not documented in this visit.              Point: Body mechanics (Done)       Learning Progress Summary             Patient Acceptance, E, VU,NR by  at 10/3/2023 0951    Comment: benefit of OOB mobility and dc planning   Family Acceptance, E, VU,NR by  at 10/3/2023 0951    Comment: benefit of OOB mobility and dc planning                         Point: Precautions (Done)       Learning Progress Summary             Patient Acceptance, E, VU,NR by  at 10/3/2023 0951    Comment: benefit of OOB mobility and dc planning   Family Acceptance, E, VU,NR by  at 10/3/2023 0951    Comment: benefit of OOB mobility and dc  planning                                         User Key       Initials Effective Dates Name Provider Type Discipline     09/21/23 -  Fara Garcia, PT Physical Therapist PT                  PT Recommendation and Plan  Planned Therapy Interventions (PT): balance training, bed mobility training, gait training, home exercise program, neuromuscular re-education, patient/family education, postural re-education, ROM (range of motion), strengthening, stretching, transfer training  Plan of Care Reviewed With: patient, family  Outcome Evaluation: PT evaluation completed. Pt presents below baseline function d/t SOA and deficits in balance, functional endurance, and activity tolerance. Pt ambulated 100 ft w/ CGA and FWW w/ further distance limited by SOA. Pt would benefit from skilled IP PT. Recommend return to facility w/ HHPT at d/c.     Time Calculation:   PT Evaluation Complexity  History, PT Evaluation Complexity: 3 or more personal factors and/or comorbidities  Examination of Body Systems (PT Eval Complexity): total of 3 or more elements  Clinical Presentation (PT Evaluation Complexity): stable  Clinical Decision Making (PT Evaluation Complexity): low complexity  Overall Complexity (PT Evaluation Complexity): low complexity     PT Charges       Row Name 10/03/23 0958             Time Calculation    Start Time 0910  -      PT Received On 10/03/23  -      PT Goal Re-Cert Due Date 10/13/23  -         Untimed Charges    PT Eval/Re-eval Minutes 48  -LH         Total Minutes    Untimed Charges Total Minutes 48  -LH       Total Minutes 48  -LH                User Key  (r) = Recorded By, (t) = Taken By, (c) = Cosigned By      Initials Name Provider Type     Fara Garcia, PT Physical Therapist                  Therapy Charges for Today       Code Description Service Date Service Provider Modifiers Qty    01439707641 HC PT EVAL LOW COMPLEXITY 4 10/3/2023 Fara Garcia, PT GP 1            PT G-Codes  Outcome  Measure Options: AM-PAC 6 Clicks Basic Mobility (PT)  AM-PAC 6 Clicks Score (PT): 17  PT Discharge Summary  Anticipated Discharge Disposition (PT): other (see comments), home with home health (return to facility w/ HHPT)    Fara Garcia, PT  10/3/2023

## 2023-10-04 ENCOUNTER — APPOINTMENT (OUTPATIENT)
Dept: GENERAL RADIOLOGY | Facility: HOSPITAL | Age: 88
DRG: 308 | End: 2023-10-04
Payer: MEDICARE

## 2023-10-04 ENCOUNTER — APPOINTMENT (OUTPATIENT)
Dept: CARDIOLOGY | Facility: HOSPITAL | Age: 88
DRG: 308 | End: 2023-10-04
Payer: MEDICARE

## 2023-10-04 LAB
ANION GAP SERPL CALCULATED.3IONS-SCNC: 10 MMOL/L (ref 5–15)
BUN SERPL-MCNC: 33 MG/DL (ref 8–23)
BUN/CREAT SERPL: 26.6 (ref 7–25)
CALCIUM SPEC-SCNC: 8.6 MG/DL (ref 8.2–9.6)
CHLORIDE SERPL-SCNC: 100 MMOL/L (ref 98–107)
CO2 SERPL-SCNC: 28 MMOL/L (ref 22–29)
CREAT SERPL-MCNC: 1.24 MG/DL (ref 0.57–1)
DEPRECATED RDW RBC AUTO: 44.8 FL (ref 37–54)
EGFRCR SERPLBLD CKD-EPI 2021: 41.4 ML/MIN/1.73
ERYTHROCYTE [DISTWIDTH] IN BLOOD BY AUTOMATED COUNT: 13.7 % (ref 12.3–15.4)
GLUCOSE SERPL-MCNC: 83 MG/DL (ref 65–99)
HCT VFR BLD AUTO: 33.8 % (ref 34–46.6)
HGB BLD-MCNC: 10.3 G/DL (ref 12–15.9)
MAGNESIUM SERPL-MCNC: 2.4 MG/DL (ref 1.6–2.4)
MCH RBC QN AUTO: 27.2 PG (ref 26.6–33)
MCHC RBC AUTO-ENTMCNC: 30.5 G/DL (ref 31.5–35.7)
MCV RBC AUTO: 89.2 FL (ref 79–97)
PLATELET # BLD AUTO: 327 10*3/MM3 (ref 140–450)
PMV BLD AUTO: 10.1 FL (ref 6–12)
POTASSIUM SERPL-SCNC: 3.8 MMOL/L (ref 3.5–5.2)
QT INTERVAL: 446 MS
QTC INTERVAL: 411 MS
RBC # BLD AUTO: 3.79 10*6/MM3 (ref 3.77–5.28)
SODIUM SERPL-SCNC: 138 MMOL/L (ref 136–145)
WBC NRBC COR # BLD: 7.79 10*3/MM3 (ref 3.4–10.8)

## 2023-10-04 PROCEDURE — 92960 CARDIOVERSION ELECTRIC EXT: CPT

## 2023-10-04 PROCEDURE — 99232 SBSQ HOSP IP/OBS MODERATE 35: CPT | Performed by: INTERNAL MEDICINE

## 2023-10-04 PROCEDURE — 5A2204Z RESTORATION OF CARDIAC RHYTHM, SINGLE: ICD-10-PCS | Performed by: INTERNAL MEDICINE

## 2023-10-04 PROCEDURE — 99152 MOD SED SAME PHYS/QHP 5/>YRS: CPT | Performed by: INTERNAL MEDICINE

## 2023-10-04 PROCEDURE — 83735 ASSAY OF MAGNESIUM: CPT | Performed by: STUDENT IN AN ORGANIZED HEALTH CARE EDUCATION/TRAINING PROGRAM

## 2023-10-04 PROCEDURE — 92960 CARDIOVERSION ELECTRIC EXT: CPT | Performed by: INTERNAL MEDICINE

## 2023-10-04 PROCEDURE — 85027 COMPLETE CBC AUTOMATED: CPT | Performed by: STUDENT IN AN ORGANIZED HEALTH CARE EDUCATION/TRAINING PROGRAM

## 2023-10-04 PROCEDURE — 71045 X-RAY EXAM CHEST 1 VIEW: CPT

## 2023-10-04 PROCEDURE — 93005 ELECTROCARDIOGRAM TRACING: CPT | Performed by: INTERNAL MEDICINE

## 2023-10-04 PROCEDURE — 80048 BASIC METABOLIC PNL TOTAL CA: CPT

## 2023-10-04 RX ORDER — FUROSEMIDE 40 MG/1
40 TABLET ORAL DAILY
Status: DISCONTINUED | OUTPATIENT
Start: 2023-10-05 | End: 2023-10-06 | Stop reason: HOSPADM

## 2023-10-04 RX ORDER — FENTANYL CITRATE 50 UG/ML
50-100 INJECTION, SOLUTION INTRAMUSCULAR; INTRAVENOUS ONCE AS NEEDED
Status: DISCONTINUED | OUTPATIENT
Start: 2023-10-04 | End: 2023-10-05

## 2023-10-04 RX ORDER — FLUMAZENIL 0.1 MG/ML
0.5 INJECTION INTRAVENOUS ONCE AS NEEDED
Status: DISCONTINUED | OUTPATIENT
Start: 2023-10-04 | End: 2023-10-05

## 2023-10-04 RX ORDER — NALOXONE HCL 0.4 MG/ML
0.4 VIAL (ML) INJECTION ONCE AS NEEDED
Status: DISCONTINUED | OUTPATIENT
Start: 2023-10-04 | End: 2023-10-05

## 2023-10-04 RX ORDER — MIDAZOLAM HYDROCHLORIDE 1 MG/ML
2-8 INJECTION INTRAMUSCULAR; INTRAVENOUS ONCE AS NEEDED
Status: DISCONTINUED | OUTPATIENT
Start: 2023-10-04 | End: 2023-10-05

## 2023-10-04 RX ADMIN — CEFUROXIME AXETIL 250 MG: 250 TABLET, FILM COATED ORAL at 20:00

## 2023-10-04 RX ADMIN — APIXABAN 2.5 MG: 2.5 TABLET, FILM COATED ORAL at 08:49

## 2023-10-04 RX ADMIN — SENNOSIDES AND DOCUSATE SODIUM 1 TABLET: 50; 8.6 TABLET ORAL at 08:49

## 2023-10-04 RX ADMIN — DOXYCYCLINE 100 MG: 100 CAPSULE ORAL at 20:01

## 2023-10-04 RX ADMIN — Medication 10 ML: at 20:01

## 2023-10-04 RX ADMIN — AMIODARONE HYDROCHLORIDE 200 MG: 200 TABLET ORAL at 08:50

## 2023-10-04 RX ADMIN — DOXYCYCLINE 100 MG: 100 CAPSULE ORAL at 08:48

## 2023-10-04 RX ADMIN — CEFUROXIME AXETIL 250 MG: 250 TABLET, FILM COATED ORAL at 08:48

## 2023-10-04 RX ADMIN — BUMETANIDE 0.5 MG: 0.25 INJECTION, SOLUTION INTRAMUSCULAR; INTRAVENOUS at 09:01

## 2023-10-04 RX ADMIN — Medication 10 ML: at 08:50

## 2023-10-04 RX ADMIN — Medication 5 MG: at 20:01

## 2023-10-04 RX ADMIN — AMIODARONE HYDROCHLORIDE 200 MG: 200 TABLET ORAL at 20:00

## 2023-10-04 RX ADMIN — APIXABAN 2.5 MG: 2.5 TABLET, FILM COATED ORAL at 20:01

## 2023-10-04 NOTE — PROGRESS NOTES
[facility]  PROGRESS NOTE    Patient Name: Amanda Glover  : 10/31/1932  MRN: [id number]    Date of Admission: 10/1/2023  Primary Care Physician: Denis Lr MD    Subjective   Subjective     CC:  Shortness of breath    HPI:  Patient with no complaints of chest pain or shortness of breath this morning.  She denies cough.  Family at bedside indicate they like to proceed with cardioversion.      Objective   Objective     Vital Signs:   Temp:  [97.5 °F (36.4 °C)-98.3 °F (36.8 °C)] 97.7 °F (36.5 °C)  Heart Rate:  [] 52  Resp:  [16-20] 18  BP: ()/(62-90) 116/69  Flow (L/min):  [1.5-4] 1.5     Physical Exam:  General appearance: alert, awake, no acute distress   Cardiovascular: Irregularly irregular, systolic murmur, radial pulse full 2/4 BL   Respiratory: CTAB, no crackles or wheezes, oxygenating well on 1 L nasal cannula  Abdomen: soft, non-tender, no organomegaly, bowel sounds normoactive     Results Reviewed:  LAB RESULTS:      Lab 10/04/23  0356 10/03/23  0346 10/02/23  0436 10/01/23  1307   WBC 7.79 7.30 9.27 11.46*   HEMOGLOBIN 10.3* 9.7* 9.6* 10.3*   HEMATOCRIT 33.8* 31.5* 30.8* 33.0*   PLATELETS 327 323 325 412   NEUTROS ABS  --  5.50  --  9.28*   IMMATURE GRANS (ABS)  --  0.08*  --  0.07*   LYMPHS ABS  --  0.73  --  1.18   MONOS ABS  --  0.60  --  0.73   EOS ABS  --  0.35  --  0.18   MCV 89.2 86.3 88.0 88.0   PROCALCITONIN  --   --   --  0.10   LACTATE  --   --   --  1.8         Lab 10/04/23  0356 10/03/23  1805 10/03/23  0346 10/02/23  1045 10/02/23  0436 10/01/23  1307   SODIUM 138  --  140 145 144 146*   POTASSIUM 3.8 4.2 3.3* 3.8 3.8 3.7   CHLORIDE 100  --  101 101 103 104   CO2 28.0  --  30.0* 31.0* 28.0 29.0   ANION GAP 10.0  --  9.0 13.0 13.0 13.0   BUN 33*  --  39* 41* 43* 38*   CREATININE 1.24*  --  1.38* 1.53* 1.57* 1.55*   EGFR 41.4*  --  36.4* 32.2* 31.2* 31.7*   GLUCOSE 83  --  98 130* 98 124*   CALCIUM 8.6  --  8.5 9.4 9.0 9.2    MAGNESIUM 2.4  --  2.1  --   --  2.1   HEMOGLOBIN A1C  --   --   --   --  5.40  --    TSH  --   --   --   --   --  0.927         Lab 10/01/23  1307   TOTAL PROTEIN 6.9   ALBUMIN 3.4*   GLOBULIN 3.5   ALT (SGPT) 53*   AST (SGOT) 37*   BILIRUBIN 0.5   ALK PHOS 119*         Lab 10/01/23  1456 10/01/23  1307   PROBNP  --  19,889.0*   HSTROP T 93* 112*             Lab 10/02/23  0436   IRON 30*   IRON SATURATION (TSAT) 15*   TIBC 197*   TRANSFERRIN 132*         Brief Urine Lab Results       None            Microbiology Results Abnormal       Procedure Component Value - Date/Time    Blood Culture - Blood, Hand, Right [[id number]]  (Normal) Collected: 10/01/23 1458    Lab Status: Preliminary result Specimen: Blood from Hand, Right Updated: 10/04/23 1531     Blood Culture No growth at 3 days    Blood Culture - Blood, Arm, Right [[id number]]  (Normal) Collected: 10/01/23 1400    Lab Status: Preliminary result Specimen: Blood from Arm, Right Updated: 10/04/23 1531     Blood Culture No growth at 3 days    COVID PRE-OP / PRE-PROCEDURE SCREENING ORDER (NO ISOLATION) - Swab, Nasopharynx [[id number]]  (Normal) Collected: 10/01/23 1457    Lab Status: Final result Specimen: Swab from Nasopharynx Updated: 10/01/23 6880    Narrative:      The following orders were created for panel order COVID PRE-OP / PRE-PROCEDURE SCREENING ORDER (NO ISOLATION) - Swab, Nasopharynx.  Procedure                               Abnormality         Status                     ---------                               -----------         ------                     Respiratory Panel PCR w/...[[id number]]  Normal              Final result                 Please view results for these tests on the individual orders.    Respiratory Panel PCR w/COVID-19(SARS-CoV-2) PHI/PERFECTO/[place]/PAD/COR/MAD/NIRU In-House, NP Swab in UTM/VTM, 3-4 HR TAT - Swab, Nasopharynx [[id number]]  (Normal) Collected: 10/01/23 1457    Lab Status: Final result Specimen: Swab from Nasopharynx Updated:  10/01/23 1555     ADENOVIRUS, PCR Not Detected     Coronavirus 229E Not Detected     Coronavirus HKU1 Not Detected     Coronavirus NL63 Not Detected     Coronavirus OC43 Not Detected     COVID19 Not Detected     Human Metapneumovirus Not Detected     Human Rhinovirus/Enterovirus Not Detected     Influenza A PCR Not Detected     Influenza B PCR Not Detected     Parainfluenza Virus 1 Not Detected     Parainfluenza Virus 2 Not Detected     Parainfluenza Virus 3 Not Detected     Parainfluenza Virus 4 Not Detected     RSV, PCR Not Detected     Bordetella pertussis pcr Not Detected     Bordetella parapertussis PCR Not Detected     Chlamydophila pneumoniae PCR Not Detected     Mycoplasma pneumo by PCR Not Detected    Narrative:      In the setting of a positive respiratory panel with a viral infection PLUS a negative procalcitonin without other underlying concern for bacterial infection, consider observing off antibiotics or discontinuation of antibiotics and continue supportive care. If the respiratory panel is positive for atypical bacterial infection (Bordetella pertussis, Chlamydophila pneumoniae, or Mycoplasma pneumoniae), consider antibiotic de-escalation to target atypical bacterial infection.            XR Chest 1 View    Result Date: 10/4/2023  XR CHEST 1 VW Date of Exam: 10/4/2023 8:58 AM EDT Indication: f/u hypoxia Comparison: 10/1/2023 Findings: There is continued extensive right upper lobe pneumonia. Previously noted right basilar opacities have largely resolved. There may be a minimal right effusion. There is stable cardiomegaly. There are low lung volumes.     Impression: Impression: Continued right upper lobe pneumonia. Improved appearance of the right lower lobe. Electronically Signed: Jyoti Gaitan MD  10/4/2023 9:57 AM EDT  Workstation ID: [id number]     Results for orders placed during the hospital encounter of 10/01/23    Adult Transthoracic Echo Complete W/ Cont if Necessary Per  Protocol    Interpretation Summary    Left ventricular systolic function is normal. Left ventricular ejection fraction appears to be 51 - 55%.    Left ventricular wall thickness is consistent with mild concentric hypertrophy.    Mild aortic valve regurgitation is present.    Moderate to severe aortic valve stenosis is present.  Gradients are variable secondary to atrial fibrillation.  Mean  gradient average 30-35 mmHg    Moderate to severe tricuspid valve regurgitation is present.    Estimated right ventricular systolic pressure from tricuspid regurgitation is mildly elevated (35-45 mmHg).    There is a left pleural effusion.      Current medications:  Scheduled Meds:amiodarone, 200 mg, Oral, Q12H  apixaban, 2.5 mg, Oral, BID  cefuroxime, 250 mg, Oral, Q12H  doxycycline, 100 mg, Oral, Q12H  [START ON 10/5/2023] furosemide, 40 mg, Oral, Daily  melatonin, 5 mg, Oral, Nightly  sennosides-docusate, 1 tablet, Oral, Daily  sodium chloride, 10 mL, Intravenous, Q12H      Continuous Infusions:   PRN Meds:.  acetaminophen    calcium carbonate    famotidine    fentaNYL citrate (PF)    flumazenil    methohexital    metoprolol tartrate    midazolam    naloxone    nitroglycerin    Potassium Replacement - Follow Nurse / BPA Driven Protocol    prochlorperazine **OR** prochlorperazine    sodium chloride    sodium chloride    sodium chloride    Assessment & Plan   Assessment & Plan     Active Hospital Problems    Diagnosis  POA    Atrial fibrillation with RVR [I48.91]  Yes    Elevated troponin [R79.89]  Yes    History of DVT (deep vein thrombosis) - 2020 [Z86.718]  Not Applicable    Alzheimer's dementia [G30.9, F02.80]  Unknown    CKD (chronic kidney disease), stage III [N18.30]  Unknown    Elevated serum creatinine [R79.89]  Unknown    Elevated transaminase level [R74.01]  Unknown    Community acquired pneumonia of right lung [J18.9]  Unknown    Hypoxia [R09.02]  Unknown    Aortic valve stenosis, mild [I35.0]  Yes    Hypertension  [I10]  Yes      Resolved Hospital Problems   No resolved problems to display.        Brief Hospital Course to date:  Amanda Glover is a 89 y.o. female with past medical history significant for hypertension, paroxysmal atrial fibrillation on Eliquis, aortic stenosis, CKD stage III, VTE, Alzheimer's dementia, who was admitted for atrial fibrillation with RVR.  Cardiology consulted, initial pharmacotherapy was not successful in rate control.  Patient to undergo cardioversion.     Atrial fibrillation with RVR  Elevated troponin due to demand ischemia  Left pleural effusion  Moderate-Severe aortic stenosis  Moderate-Severe Tricuspid Regurgitation  History of hypertension  Hx PAF on Eliquis   -Cardiology consulted; rate control not optimized  -patient underwent successful cardioversion today   -Bradycardia noted, discontinue metoprolol and start amiodarone  -TTE reviewed; AS progressed, preserved EF diurese with bumex, appears euvolemic   -Strict I/Os  -Add midodrine if hypotension occurs; hold antihypertensives at this time  -Continue Eliquis 2.5mg BID   -Optimize electrolytes   -Monitor on telemetry     Pneumonia, RUL   Acute hypoxia   -Stable  -Continue doxycycline and ceftin   -RT O2 per protocol; wean as tolerated      MEÑO on CKD III  -Improved; monitor renal function while diuresing  -BMP in AM   -Avoid nephrotoxic agents      Hypernatremia  -resolved      Acute anemia on anemia of chronic disease  -Monitor, no active signs of bleeding   -s/p Ferric gluconate infusion 10/2    Expected Discharge Location and Transportation: LTC  Expected Discharge   Expected Discharge Date: 10/5/2023; Expected Discharge Time:      DVT prophylaxis:  Medical DVT prophylaxis orders are present.     AM-PAC 6 Clicks Score (PT): 18 (10/04/23 152)    CODE STATUS:   Code Status and Medical Interventions:   Ordered at: 10/01/23 1632     Medical Intervention Limits:    NO intubation (DNI)     Level Of Support Discussed With:    Health Care  Surrogate     Code Status (Patient has no pulse and is not breathing):    No CPR (Do Not Attempt to Resuscitate)     Medical Interventions (Patient has pulse or is breathing):    Limited Support       John Israel,   10/04/23

## 2023-10-04 NOTE — PLAN OF CARE
Problem: Adult Inpatient Plan of Care  Goal: Plan of Care Review  Outcome: Ongoing, Progressing  Flowsheets (Taken 10/4/2023 0414)  Plan of Care Reviewed With: patient  Goal: Patient-Specific Goal (Individualized)  Outcome: Ongoing, Progressing  Goal: Absence of Hospital-Acquired Illness or Injury  Outcome: Ongoing, Progressing  Intervention: Identify and Manage Fall Risk  Recent Flowsheet Documentation  Taken 10/4/2023 0300 by Esther Quintana RN  Safety Promotion/Fall Prevention:   activity supervised   assistive device/personal items within reach   clutter free environment maintained   fall prevention program maintained   lighting adjusted   nonskid shoes/slippers when out of bed   room organization consistent   safety round/check completed  Taken 10/3/2023 2331 by Esther Quintana RN  Safety Promotion/Fall Prevention:   activity supervised   assistive device/personal items within reach   clutter free environment maintained   fall prevention program maintained   lighting adjusted   nonskid shoes/slippers when out of bed   room organization consistent   safety round/check completed  Taken 10/3/2023 2015 by Esther Quintana RN  Safety Promotion/Fall Prevention:   activity supervised   assistive device/personal items within reach   clutter free environment maintained   fall prevention program maintained   lighting adjusted   nonskid shoes/slippers when out of bed   room organization consistent   safety round/check completed  Intervention: Prevent Skin Injury  Recent Flowsheet Documentation  Taken 10/4/2023 0300 by Esther Quintana RN  Body Position:   position changed independently   left   turned  Skin Protection:   adhesive use limited   incontinence pads utilized   tubing/devices free from skin contact  Taken 10/3/2023 2331 by Esther Quintana, RN  Body Position:   right   turned   weight shifting  Skin Protection:   adhesive use limited   incontinence pads utilized   tubing/devices free from skin contact  Taken 10/3/2023 2015  by Esther Quintana RN  Body Position:   position changed independently   right   turned  Skin Protection:   adhesive use limited   incontinence pads utilized   tubing/devices free from skin contact  Intervention: Prevent and Manage VTE (Venous Thromboembolism) Risk  Recent Flowsheet Documentation  Taken 10/4/2023 0300 by Esther Quintana RN  Activity Management: activity encouraged  Taken 10/3/2023 2331 by Esther Quintana RN  Activity Management: activity encouraged  Taken 10/3/2023 2015 by Esther Quintana RN  Activity Management: activity encouraged  Intervention: Prevent Infection  Recent Flowsheet Documentation  Taken 10/4/2023 0300 by Esther Quintana RN  Infection Prevention:   environmental surveillance performed   rest/sleep promoted  Taken 10/3/2023 2331 by Esther Quintana RN  Infection Prevention:   environmental surveillance performed   rest/sleep promoted  Taken 10/3/2023 2015 by Esther Quintana RN  Infection Prevention:   environmental surveillance performed   rest/sleep promoted  Goal: Optimal Comfort and Wellbeing  Outcome: Ongoing, Progressing  Intervention: Provide Person-Centered Care  Recent Flowsheet Documentation  Taken 10/3/2023 2015 by Esther Quintana RN  Trust Relationship/Rapport:   care explained   choices provided   emotional support provided   empathic listening provided   questions encouraged   questions answered   reassurance provided   thoughts/feelings acknowledged  Goal: Readiness for Transition of Care  Outcome: Ongoing, Progressing     Problem: Fall Injury Risk  Goal: Absence of Fall and Fall-Related Injury  Outcome: Ongoing, Progressing  Intervention: Identify and Manage Contributors  Recent Flowsheet Documentation  Taken 10/4/2023 0300 by Esther Quintana RN  Medication Review/Management: medications reviewed  Taken 10/3/2023 2331 by Esther Quintana RN  Medication Review/Management: medications reviewed  Taken 10/3/2023 2015 by Esther Quintana RN  Medication Review/Management: medications  reviewed  Intervention: Promote Injury-Free Environment  Recent Flowsheet Documentation  Taken 10/4/2023 0300 by Esther Quintana RN  Safety Promotion/Fall Prevention:   activity supervised   assistive device/personal items within reach   clutter free environment maintained   fall prevention program maintained   lighting adjusted   nonskid shoes/slippers when out of bed   room organization consistent   safety round/check completed  Taken 10/3/2023 2331 by Esther Quintana RN  Safety Promotion/Fall Prevention:   activity supervised   assistive device/personal items within reach   clutter free environment maintained   fall prevention program maintained   lighting adjusted   nonskid shoes/slippers when out of bed   room organization consistent   safety round/check completed  Taken 10/3/2023 2015 by Esther Quintana RN  Safety Promotion/Fall Prevention:   activity supervised   assistive device/personal items within reach   clutter free environment maintained   fall prevention program maintained   lighting adjusted   nonskid shoes/slippers when out of bed   room organization consistent   safety round/check completed   Goal Outcome Evaluation:  Plan of Care Reviewed With: patient         Patient with no complaints this shift.  Q2 turn.  Bilateral heels elevated.  Bed alarm activated.  Patients daughter at the bedside throughout shift.  Patient rested throughout shift.      Patients daughter refused 2300 dose of Bumex stating that patient has not had any rest and would like patient to rest.  This nurse spoke with pharmacist Mekhi regarding rescheduling medication to the morning.  Per pharmacist, must obtain MD order .  Will pass along to dayshift RN.

## 2023-10-04 NOTE — CASE MANAGEMENT/SOCIAL WORK
Continued Stay Note   [facility]     Patient Name: Amanda Glover  MRN: [id number]  Today's Date: 10/4/2023    Admit Date: 10/1/2023    Plan: LTC   Discharge Plan       Row Name 10/04/23 0848       Plan    Plan LTC    Patient/Family in Agreement with Plan yes    Plan Comments This patient is from [facility] and is able to return there at discharge. Per Philippe, from the facility, they do supply PT and OT as recommended by our PT and OT. She is currently on 3L oxygen and may need home oxygen set up prior to discharge. [facility] will need the D/C Summary and PT/OT notes faxed to them upon discharge. She will need a stretcher set up for transport. CM will continue to follow.    Final Discharge Disposition Code 04 - intermediate care facility                   Discharge Codes    No documentation.                 Expected Discharge Date and Time       Expected Discharge Date Expected Discharge Time    Oct 5, 2023               Nelly Neely RN

## 2023-10-04 NOTE — PROGRESS NOTES
"[facility] Progress Note     LOS: 3 days   Patient Care Team:  Denis Lr MD as PCP - General (Family Medicine)  PCP:  Denis Lr MD    Chief Complaint: Follow-up atrial fibrillation, aortic stenosis    Subjective: Patient was a little less oriented today per her daughter.  She remains in A-fib and rates between 101 120 bpm.  Still on supplemental O2      Review of Systems:   All systems have been reviewed and are negative with the exception of those mentioned above.      Objective:    Vital Sign Min/Max for last 24 hours  Temp  Min: 97.5 °F (36.4 °C)  Max: 98.3 °F (36.8 °C)   BP  Min: 94/66  Max: 118/68   Pulse  Min: 92  Max: 126   Resp  Min: 18  Max: 20   SpO2  Min: 88 %  Max: 98 %   No data recorded   No data recorded     Flowsheet Rows      Flowsheet Row First Filed Value   Admission Height 162.6 cm (64\") Documented at 10/01/2023 1305   Admission Weight 81.6 kg (180 lb) Documented at 10/01/2023 1305            Telemetry: Atrial fibrillation with rapid ventricular response      Intake/Output Summary (Last 24 hours) at 10/4/2023 1052  Last data filed at 10/4/2023 0900  Gross per 24 hour   Intake 480 ml   Output 2200 ml   Net -1720 ml     Intake & Output (last 3 days)         10/01 0701  10/02 0700 10/02 0701  10/03 0700 10/03 0701  10/04 0700 10/04 0701  10/05 0700    P.O.  960 600     Total Intake(mL/kg)  960 (12.8) 600 (8)     Urine (mL/kg/hr)  180 (0.1) 1800 (1) 400 (1.4)    Total Output  180 1800 400    Net  +780 -1200 -400            Urine Unmeasured Occurrence 4 x 4 x 1 x              Physical Exam:  Pulmonary:      Effort: Pulmonary effort is normal.      Breath sounds: No rales.   Cardiovascular:      Irregular rhythm.      Murmurs: There is a grade 3/6 systolic murmur.   Edema:     Pretibial: bilateral trace edema of the pretibial area.       LABS/DIAGNOSTIC DATA:  Results from last 7 days   Lab Units 10/04/23  0356 10/03/23  0346 10/02/23  0436 "   WBC 10*3/mm3 7.79 7.30 9.27   HEMOGLOBIN g/dL 10.3* 9.7* 9.6*   HEMATOCRIT % 33.8* 31.5* 30.8*   PLATELETS 10*3/mm3 327 323 325     Lab Results   Lab Value Date/Time    TROPONINT 93 (C) 10/01/2023 1456    TROPONINT 112 (C) 10/01/2023 1307    TROPONINT <0.010 04/02/2020 2119    TROPONINT <0.010 03/16/2020 1217         Results from last 7 days   Lab Units 10/04/23  0356 10/03/23  1805 10/03/23  0346 10/02/23  1045 10/02/23  0436 10/01/23  1307   SODIUM mmol/L 138  --  140 145   < > 146*   POTASSIUM mmol/L 3.8 4.2 3.3* 3.8   < > 3.7   CHLORIDE mmol/L 100  --  101 101   < > 104   CO2 mmol/L 28.0  --  30.0* 31.0*   < > 29.0   BUN mg/dL 33*  --  39* 41*   < > 38*   CREATININE mg/dL 1.24*  --  1.38* 1.53*   < > 1.55*   CALCIUM mg/dL 8.6  --  8.5 9.4   < > 9.2   BILIRUBIN mg/dL  --   --   --   --   --  0.5   ALK PHOS U/L  --   --   --   --   --  119*   ALT (SGPT) U/L  --   --   --   --   --  53*   AST (SGOT) U/L  --   --   --   --   --  37*   GLUCOSE mg/dL 83  --  98 130*   < > 124*    < > = values in this interval not displayed.     Results from last 7 days   Lab Units 10/02/23  0436   HEMOGLOBIN A1C % 5.40         Results from last 7 days   Lab Units 10/01/23  1307   TSH uIU/mL 0.927       Echo:    Left ventricular systolic function is normal. Left ventricular ejection fraction appears to be 51 - 55%.    Left ventricular wall thickness is consistent with mild concentric hypertrophy.    Mild aortic valve regurgitation is present.    Moderate to severe aortic valve stenosis is present.  Gradients are variable secondary to atrial fibrillation.  Mean  gradient average 30-35 mmHg    Moderate to severe tricuspid valve regurgitation is present.    Estimated right ventricular systolic pressure from tricuspid regurgitation is mildly elevated (35-45 mmHg).    There is a left pleural effusion.    Chest x-ray:     Continued right upper lobe pneumonia. Improved appearance of the right lower lobe.     Medication Review:   amiodarone,  200 mg, Oral, Q12H  apixaban, 2.5 mg, Oral, BID  bumetanide, 0.5 mg, Intravenous, Q12H  cefuroxime, 250 mg, Oral, Q12H  doxycycline, 100 mg, Oral, Q12H  melatonin, 5 mg, Oral, Nightly  sennosides-docusate, 1 tablet, Oral, Daily  sodium chloride, 10 mL, Intravenous, Q12H               Hypertension    Aortic valve stenosis, mild    Atrial fibrillation with RVR    Elevated troponin    History of DVT (deep vein thrombosis) - 2020    Alzheimer's dementia    CKD (chronic kidney disease), stage III    Elevated serum creatinine    Elevated transaminase level    Community acquired pneumonia of right lung    Hypoxia      Assessment/Plan:    Atrial fibrillation with RVR  Patient has been maintained on Eliquis 2.5 mg daily based on age and renal function typically 1.5 or greater  Continue p.o. amiodarone 200 mg twice daily  We will plan for cardioversion this afternoon for restoration of sinus rhythm.  Patient and family agreeable to proceed, discussed risks and benefits and potential need for transcutaneous pacing at time of cardioversion  Hold metoprolol prior to cardioversion we will reassess rates in sinus rhythm  Moderate to severe aortic stenosis  Aortic valve mean gradient 30 to 35 mmHg  May be contributing to patient's heart failure symptoms  Would defer further work-up for TAVR at this time, reassess symptoms of sinus rhythm restored  Acute on chronic heart failure with preserved ejection fraction  EF 51 to 55%  EKG repeated today showing improvement in pulmonary edema and effusions.  Has a persistent right upper lobe pneumonia  Will DC IV Bumex today and restart p.o. Lasix 40 mg tomorrow  CKD  Creatinine actually improving with diuresis 1.24 today  Right upper lobe pneumonia  Antibiotics per hospital medicine team      Further recommendations pending cardioversion later this afternoon      Addendum: Patient underwent a successful cardioversion.  Rhythm now sinus bradycardia.  We will discontinue metoprolol.  Continue  amiodarone 200 mg twice daily for maintenance of sinus rhythm.  Would do 200 mg twice a day for 2 weeks then decrease to 200 mg daily    Denis Chand MD [facility]  10/04/23

## 2023-10-05 LAB
ANION GAP SERPL CALCULATED.3IONS-SCNC: 11 MMOL/L (ref 5–15)
BASOPHILS # BLD AUTO: 0.04 10*3/MM3 (ref 0–0.2)
BASOPHILS NFR BLD AUTO: 0.6 % (ref 0–1.5)
BUN SERPL-MCNC: 41 MG/DL (ref 8–23)
BUN/CREAT SERPL: 24.4 (ref 7–25)
CALCIUM SPEC-SCNC: 9.4 MG/DL (ref 8.2–9.6)
CHLORIDE SERPL-SCNC: 102 MMOL/L (ref 98–107)
CO2 SERPL-SCNC: 27 MMOL/L (ref 22–29)
CREAT SERPL-MCNC: 1.68 MG/DL (ref 0.57–1)
DEPRECATED RDW RBC AUTO: 44.3 FL (ref 37–54)
EGFRCR SERPLBLD CKD-EPI 2021: 28.8 ML/MIN/1.73
EOSINOPHIL # BLD AUTO: 0.3 10*3/MM3 (ref 0–0.4)
EOSINOPHIL NFR BLD AUTO: 4.2 % (ref 0.3–6.2)
ERYTHROCYTE [DISTWIDTH] IN BLOOD BY AUTOMATED COUNT: 13.9 % (ref 12.3–15.4)
GLUCOSE SERPL-MCNC: 94 MG/DL (ref 65–99)
HCT VFR BLD AUTO: 32.1 % (ref 34–46.6)
HGB BLD-MCNC: 10 G/DL (ref 12–15.9)
IMM GRANULOCYTES # BLD AUTO: 0.1 10*3/MM3 (ref 0–0.05)
IMM GRANULOCYTES NFR BLD AUTO: 1.4 % (ref 0–0.5)
LYMPHOCYTES # BLD AUTO: 0.98 10*3/MM3 (ref 0.7–3.1)
LYMPHOCYTES NFR BLD AUTO: 13.6 % (ref 19.6–45.3)
MCH RBC QN AUTO: 27.5 PG (ref 26.6–33)
MCHC RBC AUTO-ENTMCNC: 31.2 G/DL (ref 31.5–35.7)
MCV RBC AUTO: 88.2 FL (ref 79–97)
MONOCYTES # BLD AUTO: 0.64 10*3/MM3 (ref 0.1–0.9)
MONOCYTES NFR BLD AUTO: 8.9 % (ref 5–12)
NEUTROPHILS NFR BLD AUTO: 5.15 10*3/MM3 (ref 1.7–7)
NEUTROPHILS NFR BLD AUTO: 71.3 % (ref 42.7–76)
NRBC BLD AUTO-RTO: 0 /100 WBC (ref 0–0.2)
PLATELET # BLD AUTO: 343 10*3/MM3 (ref 140–450)
PMV BLD AUTO: 10.2 FL (ref 6–12)
POTASSIUM SERPL-SCNC: 3.8 MMOL/L (ref 3.5–5.2)
QT INTERVAL: 332 MS
QTC INTERVAL: 484 MS
RBC # BLD AUTO: 3.64 10*6/MM3 (ref 3.77–5.28)
SODIUM SERPL-SCNC: 140 MMOL/L (ref 136–145)
WBC NRBC COR # BLD: 7.21 10*3/MM3 (ref 3.4–10.8)

## 2023-10-05 PROCEDURE — 85025 COMPLETE CBC W/AUTO DIFF WBC: CPT | Performed by: STUDENT IN AN ORGANIZED HEALTH CARE EDUCATION/TRAINING PROGRAM

## 2023-10-05 PROCEDURE — 93010 ELECTROCARDIOGRAM REPORT: CPT | Performed by: INTERNAL MEDICINE

## 2023-10-05 PROCEDURE — 97116 GAIT TRAINING THERAPY: CPT

## 2023-10-05 PROCEDURE — 99232 SBSQ HOSP IP/OBS MODERATE 35: CPT

## 2023-10-05 PROCEDURE — 97530 THERAPEUTIC ACTIVITIES: CPT

## 2023-10-05 PROCEDURE — 80048 BASIC METABOLIC PNL TOTAL CA: CPT | Performed by: STUDENT IN AN ORGANIZED HEALTH CARE EDUCATION/TRAINING PROGRAM

## 2023-10-05 PROCEDURE — 93005 ELECTROCARDIOGRAM TRACING: CPT | Performed by: INTERNAL MEDICINE

## 2023-10-05 RX ADMIN — Medication 10 ML: at 08:34

## 2023-10-05 RX ADMIN — SENNOSIDES AND DOCUSATE SODIUM 1 TABLET: 50; 8.6 TABLET ORAL at 08:33

## 2023-10-05 RX ADMIN — AMIODARONE HYDROCHLORIDE 200 MG: 200 TABLET ORAL at 20:41

## 2023-10-05 RX ADMIN — AMIODARONE HYDROCHLORIDE 200 MG: 200 TABLET ORAL at 08:33

## 2023-10-05 RX ADMIN — Medication 10 ML: at 20:42

## 2023-10-05 RX ADMIN — FUROSEMIDE 40 MG: 40 TABLET ORAL at 08:33

## 2023-10-05 RX ADMIN — CEFUROXIME AXETIL 250 MG: 250 TABLET, FILM COATED ORAL at 08:33

## 2023-10-05 RX ADMIN — APIXABAN 2.5 MG: 2.5 TABLET, FILM COATED ORAL at 20:42

## 2023-10-05 RX ADMIN — Medication 5 MG: at 20:42

## 2023-10-05 RX ADMIN — APIXABAN 2.5 MG: 2.5 TABLET, FILM COATED ORAL at 08:33

## 2023-10-05 RX ADMIN — DOXYCYCLINE 100 MG: 100 CAPSULE ORAL at 08:33

## 2023-10-05 NOTE — PROGRESS NOTES
[facility] Medicine Services  PROGRESS NOTE    Patient Name: Amanda Glover  : 10/31/1932  MRN: [id number]    Date of Admission: 10/1/2023  Primary Care Physician: Denis Lr MD    Subjective   Subjective     CC:  Shortness of breath    HPI:  Patient with worsening confusion this morning.  She reportedly did not sleep well last night and has had confusion persisting since cardioversion.  She overall has no complaints today.      Objective   Objective     Vital Signs:   Temp:  [97.4 °F (36.3 °C)-98.5 °F (36.9 °C)] 97.9 °F (36.6 °C)  Heart Rate:  [47-59] 54  Resp:  [18-20] 18  BP: (103-122)/(50-73) 122/63  Flow (L/min):  [1-2] 2     Physical Exam:  General appearance: alert, awake, disoriented, confused, no acute distress   Cardiovascular: Bradycardic, systolic murmur, radial pulse full 2/4 BL   Respiratory: CTAB, no crackles or wheezes, saturated 87 to 88% on room air  Abdomen: soft, non-tender, no organomegaly, bowel sounds normoactive     Results Reviewed:  LAB RESULTS:      Lab 10/05/23  0356 10/04/23  0356 10/03/23  0346 10/02/23  0436 10/01/23  1307   WBC 7.21 7.79 7.30 9.27 11.46*   HEMOGLOBIN 10.0* 10.3* 9.7* 9.6* 10.3*   HEMATOCRIT 32.1* 33.8* 31.5* 30.8* 33.0*   PLATELETS 343 327 323 325 412   NEUTROS ABS 5.15  --  5.50  --  9.28*   IMMATURE GRANS (ABS) 0.10*  --  0.08*  --  0.07*   LYMPHS ABS 0.98  --  0.73  --  1.18   MONOS ABS 0.64  --  0.60  --  0.73   EOS ABS 0.30  --  0.35  --  0.18   MCV 88.2 89.2 86.3 88.0 88.0   PROCALCITONIN  --   --   --   --  0.10   LACTATE  --   --   --   --  1.8         Lab 10/05/23  0356 10/04/23  0356 10/03/23  1805 10/03/23  0346 10/02/23  1045 10/02/23  0436 10/01/23  1307   SODIUM 140 138  --  140 145 144 146*   POTASSIUM 3.8 3.8 4.2 3.3* 3.8 3.8 3.7   CHLORIDE 102 100  --  101 101 103 104   CO2 27.0 28.0  --  30.0* 31.0* 28.0 29.0   ANION GAP 11.0 10.0  --  9.0 13.0 13.0 13.0   BUN 41* 33*  --  39* 41* 43* 38*   CREATININE 1.68* 1.24*  --   1.38* 1.53* 1.57* 1.55*   EGFR 28.8* 41.4*  --  36.4* 32.2* 31.2* 31.7*   GLUCOSE 94 83  --  98 130* 98 124*   CALCIUM 9.4 8.6  --  8.5 9.4 9.0 9.2   MAGNESIUM  --  2.4  --  2.1  --   --  2.1   HEMOGLOBIN A1C  --   --   --   --   --  5.40  --    TSH  --   --   --   --   --   --  0.927         Lab 10/01/23  1307   TOTAL PROTEIN 6.9   ALBUMIN 3.4*   GLOBULIN 3.5   ALT (SGPT) 53*   AST (SGOT) 37*   BILIRUBIN 0.5   ALK PHOS 119*         Lab 10/01/23  1456 10/01/23  1307   PROBNP  --  19,889.0*   HSTROP T 93* 112*             Lab 10/02/23  0436   IRON 30*   IRON SATURATION (TSAT) 15*   TIBC 197*   TRANSFERRIN 132*         Brief Urine Lab Results       None            Microbiology Results Abnormal       Procedure Component Value - Date/Time    Blood Culture - Blood, Hand, Right [[id number]]  (Normal) Collected: 10/01/23 1458    Lab Status: Preliminary result Specimen: Blood from Hand, Right Updated: 10/04/23 1531     Blood Culture No growth at 3 days    Blood Culture - Blood, Arm, Right [[id number]]  (Normal) Collected: 10/01/23 1400    Lab Status: Preliminary result Specimen: Blood from Arm, Right Updated: 10/04/23 1531     Blood Culture No growth at 3 days    COVID PRE-OP / PRE-PROCEDURE SCREENING ORDER (NO ISOLATION) - Swab, Nasopharynx [[id number]]  (Normal) Collected: 10/01/23 1457    Lab Status: Final result Specimen: Swab from Nasopharynx Updated: 10/01/23 5639    Narrative:      The following orders were created for panel order COVID PRE-OP / PRE-PROCEDURE SCREENING ORDER (NO ISOLATION) - Swab, Nasopharynx.  Procedure                               Abnormality         Status                     ---------                               -----------         ------                     Respiratory Panel PCR w/...[[id number]]  Normal              Final result                 Please view results for these tests on the individual orders.    Respiratory Panel PCR w/COVID-19(SARS-CoV-2) PHI/PERFECTO/[place]/PAD/COR/MAD/NIRU In-House, NP  Swab in UTM/VTM, 3-4 HR TAT - Swab, Nasopharynx [[id number]]  (Normal) Collected: 10/01/23 1457    Lab Status: Final result Specimen: Swab from Nasopharynx Updated: 10/01/23 7567     ADENOVIRUS, PCR Not Detected     Coronavirus 229E Not Detected     Coronavirus HKU1 Not Detected     Coronavirus NL63 Not Detected     Coronavirus OC43 Not Detected     COVID19 Not Detected     Human Metapneumovirus Not Detected     Human Rhinovirus/Enterovirus Not Detected     Influenza A PCR Not Detected     Influenza B PCR Not Detected     Parainfluenza Virus 1 Not Detected     Parainfluenza Virus 2 Not Detected     Parainfluenza Virus 3 Not Detected     Parainfluenza Virus 4 Not Detected     RSV, PCR Not Detected     Bordetella pertussis pcr Not Detected     Bordetella parapertussis PCR Not Detected     Chlamydophila pneumoniae PCR Not Detected     Mycoplasma pneumo by PCR Not Detected    Narrative:      In the setting of a positive respiratory panel with a viral infection PLUS a negative procalcitonin without other underlying concern for bacterial infection, consider observing off antibiotics or discontinuation of antibiotics and continue supportive care. If the respiratory panel is positive for atypical bacterial infection (Bordetella pertussis, Chlamydophila pneumoniae, or Mycoplasma pneumoniae), consider antibiotic de-escalation to target atypical bacterial infection.            XR Chest 1 View    Result Date: 10/4/2023  XR CHEST 1 VW Date of Exam: 10/4/2023 8:58 AM EDT Indication: f/u hypoxia Comparison: 10/1/2023 Findings: There is continued extensive right upper lobe pneumonia. Previously noted right basilar opacities have largely resolved. There may be a minimal right effusion. There is stable cardiomegaly. There are low lung volumes.     Impression: Impression: Continued right upper lobe pneumonia. Improved appearance of the right lower lobe. Electronically Signed: Jyoti Gaitan MD  10/4/2023 9:57 AM EDT  Workstation ID:  [id number]     Results for orders placed during the hospital encounter of 10/01/23    Adult Transthoracic Echo Complete W/ Cont if Necessary Per Protocol    Interpretation Summary    Left ventricular systolic function is normal. Left ventricular ejection fraction appears to be 51 - 55%.    Left ventricular wall thickness is consistent with mild concentric hypertrophy.    Mild aortic valve regurgitation is present.    Moderate to severe aortic valve stenosis is present.  Gradients are variable secondary to atrial fibrillation.  Mean  gradient average 30-35 mmHg    Moderate to severe tricuspid valve regurgitation is present.    Estimated right ventricular systolic pressure from tricuspid regurgitation is mildly elevated (35-45 mmHg).    There is a left pleural effusion.      Current medications:  Scheduled Meds:amiodarone, 200 mg, Oral, Q12H  apixaban, 2.5 mg, Oral, BID  furosemide, 40 mg, Oral, Daily  melatonin, 5 mg, Oral, Nightly  sennosides-docusate, 1 tablet, Oral, Daily  sodium chloride, 10 mL, Intravenous, Q12H      Continuous Infusions:   PRN Meds:.  acetaminophen    calcium carbonate    famotidine    metoprolol tartrate    nitroglycerin    Potassium Replacement - Follow Nurse / BPA Driven Protocol    prochlorperazine **OR** prochlorperazine    sodium chloride    sodium chloride    sodium chloride    Assessment & Plan   Assessment & Plan     Active Hospital Problems    Diagnosis  POA    Atrial fibrillation with RVR [I48.91]  Yes    Elevated troponin [R79.89]  Yes    History of DVT (deep vein thrombosis) - 2020 [Z86.718]  Not Applicable    Alzheimer's dementia [G30.9, F02.80]  Unknown    CKD (chronic kidney disease), stage III [N18.30]  Unknown    Elevated serum creatinine [R79.89]  Unknown    Elevated transaminase level [R74.01]  Unknown    Community acquired pneumonia of right lung [J18.9]  Unknown    Hypoxia [R09.02]  Unknown    Aortic valve stenosis, mild [I35.0]  Yes    Hypertension [I10]  Yes      Resolved  Hospital Problems   No resolved problems to display.        Brief Hospital Course to date:  Amanda Glover is a 89 y.o. female with past medical history significant for hypertension, paroxysmal atrial fibrillation on Eliquis, aortic stenosis, CKD stage III, VTE, Alzheimer's dementia, who was admitted for atrial fibrillation with RVR.  Cardiology consulted, initial pharmacotherapy was not successful in rate control.  Patient underwent cardioversion successfully.  Found to be bradycardic postprocedure, holding beta-blockers.     Atrial fibrillation with RVR  Elevated troponin due to demand ischemia  Left pleural effusion  Moderate-Severe aortic stenosis  Moderate-Severe Tricuspid Regurgitation  History of hypertension  Hx PAF on Eliquis   -Cardiology consulted; rate control not optimized  -patient underwent successful cardioversion 10/4/23  -Bradycardia noted, discontinue metoprolol and start amiodarone   -Continue amiodarone 200mg q12h x2 weeks then daily   -F/u in 4-6 weeks   -TTE reviewed; AS progressed, preserved EF diurese as tolerated, appears euvolemic   -Strict I/Os  -Add midodrine if hypotension occurs; hold antihypertensives at this time  -Continue Eliquis 2.5mg BID   -Optimize electrolytes   -Monitor on telemetry     Pneumonia, RUL   Acute hypoxia   -Stable  -Continue doxycycline and ceftin   -RT O2 per protocol; wean as tolerated      MEÑO on CKD III  -Improved; monitor renal function while diuresing   -Transition to lasix from bumex   -BMP in AM   -Avoid nephrotoxic agents      Hypernatremia  -resolved      Acute anemia on anemia of chronic disease  -Monitor, no active signs of bleeding   -s/p Ferric gluconate infusion 10/2     Dementia  -Trial seroquel this evening to minimize agitation/delirium and promote sleep     Expected Discharge Location and Transportation: LTC  Expected Discharge   Expected Discharge Date: 10/6/2023; Expected Discharge Time:      DVT prophylaxis:  Medical DVT prophylaxis orders are  present.     AM-PAC 6 Clicks Score (PT): 18 (10/04/23 5360)    CODE STATUS:   Code Status and Medical Interventions:   Ordered at: 10/01/23 1632     Medical Intervention Limits:    NO intubation (DNI)     Level Of Support Discussed With:    Health Care Surrogate     Code Status (Patient has no pulse and is not breathing):    No CPR (Do Not Attempt to Resuscitate)     Medical Interventions (Patient has pulse or is breathing):    Limited Support       John Israel, DO  10/05/23

## 2023-10-05 NOTE — PROGRESS NOTES
"  [facility] at [facility]  PROGRESS NOTE    Date of Admission: 10/1/2023  Date of Service: 10/05/23    Primary Care Physician: Denis Lr MD    Chief Complaint: Follow-up atrial fibrillation, aortic stenosis       Subjective      No acute events overnight.  Daughter at bedside.  Maintaining sinus bradycardia after cardioversion.  Denies chest pain or shortness of breath.       Objective   Vitals: /63 (BP Location: Left arm, Patient Position: Lying)   Pulse 51   Temp 97.4 °F (36.3 °C) (Axillary)   Resp 18   Ht 162.6 cm (64\")   Wt 74.8 kg (164 lb 14.4 oz)   SpO2 98%   BMI 28.31 kg/m²     Physical Exam:  GENERAL:  in no acute distress.   HEENT: no jugular venous distention  HEART: Regular rhythm, normal rate, and no murmurs, gallops, or rubs.   LUNGS: Clear to auscultation bilaterally. No wheezing, rales or rhonchi.  EXTREMITIES: No edema noted.     Results:  Results from last 7 days   Lab Units 10/05/23  0356 10/04/23  0356 10/03/23  0346   WBC 10*3/mm3 7.21 7.79 7.30   HEMOGLOBIN g/dL 10.0* 10.3* 9.7*   HEMATOCRIT % 32.1* 33.8* 31.5*   PLATELETS 10*3/mm3 343 327 323     Results from last 7 days   Lab Units 10/05/23  0356 10/04/23  0356 10/03/23  1805 10/03/23  0346   SODIUM mmol/L 140 138  --  140   POTASSIUM mmol/L 3.8 3.8 4.2 3.3*   CHLORIDE mmol/L 102 100  --  101   CO2 mmol/L 27.0 28.0  --  30.0*   BUN mg/dL 41* 33*  --  39*   CREATININE mg/dL 1.68* 1.24*  --  1.38*   GLUCOSE mg/dL 94 83  --  98      Lab Results   Component Value Date    AST 37 (H) 10/01/2023    ALT 53 (H) 10/01/2023     Results from last 7 days   Lab Units 10/02/23  0436   HEMOGLOBIN A1C % 5.40         Results from last 7 days   Lab Units 10/01/23  1307   TSH uIU/mL 0.927             Results from last 7 days   Lab Units 10/01/23  1456 10/01/23  1307   HSTROP T ng/L 93* 112*     Results from last 7 days   Lab Units 10/01/23  1307   PROBNP pg/mL 19,889.0*         Intake/Output Summary (Last 24 hours) at " 10/5/2023 0849  Last data filed at 10/4/2023 2000  Gross per 24 hour   Intake 240 ml   Output 1000 ml   Net -760 ml       I personally reviewed the patient's EKG/Telemetry data    Radiology Data:   Results for orders placed during the hospital encounter of 10/01/23    Adult Transthoracic Echo Complete W/ Cont if Necessary Per Protocol    Interpretation Summary    Left ventricular systolic function is normal. Left ventricular ejection fraction appears to be 51 - 55%.    Left ventricular wall thickness is consistent with mild concentric hypertrophy.    Mild aortic valve regurgitation is present.    Moderate to severe aortic valve stenosis is present.  Gradients are variable secondary to atrial fibrillation.  Mean  gradient average 30-35 mmHg    Moderate to severe tricuspid valve regurgitation is present.    Estimated right ventricular systolic pressure from tricuspid regurgitation is mildly elevated (35-45 mmHg).    There is a left pleural effusion.        Current Medications:  amiodarone, 200 mg, Oral, Q12H  apixaban, 2.5 mg, Oral, BID  furosemide, 40 mg, Oral, Daily  melatonin, 5 mg, Oral, Nightly  sennosides-docusate, 1 tablet, Oral, Daily  sodium chloride, 10 mL, Intravenous, Q12H           Assessment:  Atrial fibrillation with RVR, now status post successful ECV, maintaining sinus bradycardia  NSTEMI, likely type II mechanism in the setting of atrial fibrillation with RVR  Probably moderate aortic stenosis, gradients difficult to assess on echo this admission due to atrial fibrillation.  Average mean gradient 30-35 mmHg.  Acute on chronic heart failure preserved ejection fraction, EF 51-55%  CKD, creatinine improving with diuresis  Pneumonia, per hospitalist    Plan:  Maintaining sinus bradycardia after cardioversion.  Continue to hold beta-blockade.  Continue amiodarone 200 mg twice daily x2 weeks followed by amiodarone 200 mg daily until follow-up appointment with Dr. Chand.  Continue Eliquis 2.5mg BID for  anticoagulation and Lasix 40mg daily for diuresis.   Management of pneumonia per primary team.  Discussed VHD and in the absence of symptoms, would recommend medical management. Will re-evaluate if symptoms return despite maintaining NSR.   Okay to discharge home from a cardiac standpoint.  Can follow-up with Dr. Chand as an outpatient in 4 to 6 weeks.      Gilma Barker PA-C

## 2023-10-05 NOTE — PLAN OF CARE
Goal Outcome Evaluation:  Plan of Care Reviewed With: patient        Progress: improving  Outcome Evaluation: Physical therapy treatment complete. The patient continues to require additional time to complete mobility. Patient improved tolerance to mobility and with decreased subjective complaints of pain. Patient increased ambulation distance compared to previous treatment session. She was able to stand for around 6 minutes with supervision. The patient continues to present below baseline for functional mobility. The patient would continue to benefit from skilled PT to address strength, balance and activity tolerance deficits. Continue to current PT POC      Anticipated Discharge Disposition (PT):  (return to facility w/ HHPT)

## 2023-10-05 NOTE — THERAPY TREATMENT NOTE
Patient Name: Amanda Glover  : 10/31/1932    MRN: [id number]                              Today's Date: 10/5/2023       Admit Date: 10/1/2023    Visit Dx:     ICD-10-CM ICD-9-CM   1. Atrial fibrillation with rapid ventricular response  I48.91 427.31   2. Elevated troponin  R79.89 790.6   3. Shortness of breath  R06.02 786.05   4. Abnormal chest x-ray  R93.89 793.2   5. Acute congestive heart failure, unspecified heart failure type  I50.9 428.0   6. Elevated serum creatinine  R79.89 790.99   7. New onset a-fib  I48.91 427.31   8. Acute deep vein thrombosis (DVT) of popliteal vein of both lower extremities  I82.433 453.41   9. Aortic valve stenosis, mild  I35.0 424.1   10. Atrial fibrillation with RVR  I48.91 427.31   11. History of DVT (deep vein thrombosis) -   Z86.718 V12.51   12. Elevated transaminase level  R74.01 790.4   13. Hypoxia  R09.02 799.02     Patient Active Problem List   Diagnosis    New onset a-fib    Acute deep vein thrombosis (DVT) of popliteal vein of both lower extremities    Hypertension    Aortic valve stenosis, mild    Atrial fibrillation with RVR    Elevated troponin    History of DVT (deep vein thrombosis) -     Alzheimer's dementia    CKD (chronic kidney disease), stage III    Elevated serum creatinine    Elevated transaminase level    Community acquired pneumonia of right lung    Hypoxia     Past Medical History:   Diagnosis Date    Aortic valve stenosis     Atrial fibrillation     Deep vein thrombosis     Dementia     Hyperlipidemia     Hypertension     Murmur     Osteoporosis      Past Surgical History:   Procedure Laterality Date    APPENDECTOMY      FEMUR FRACTURE SURGERY Left 2020    HERNIA REPAIR      HYSTERECTOMY        General Information       Row Name 10/05/23 0959          Physical Therapy Time and Intention    Document Type therapy note (daily note)  -KW     Mode of Treatment physical therapy  -KW       Row Name 10/05/23 0959          General Information    Patient  Profile Reviewed yes  -KW     Existing Precautions/Restrictions fall;oxygen therapy device and L/min  -KW       Row Name 10/05/23 0959          Cognition    Orientation Status (Cognition) other (see comments);oriented to;place;disoriented to;time  knew name but not birthday  -KW       Row Name 10/05/23 0959          Safety Issues, Functional Mobility    Impairments Affecting Function (Mobility) balance;endurance/activity tolerance;shortness of breath  -KW               User Key  (r) = Recorded By, (t) = Taken By, (c) = Cosigned By      Initials Name Provider Type    Daxa Agustin PT Physical Therapist                   Mobility       Row Name 10/05/23 0959          Bed Mobility    Bed Mobility supine-sit  -KW     Supine-Sit [place] (Bed Mobility) set up;verbal cues  -KW     Assistive Device (Bed Mobility) head of bed elevated  -KW       Row Name 10/05/23 0959          Sit-Stand Transfer    Sit-Stand [place] (Transfers) contact guard;verbal cues  -KW     Assistive Device (Sit-Stand Transfers) walker, front-wheeled  -KW       Row Name 10/05/23 0959          Gait/Stairs (Locomotion)    [place] Level (Gait) contact guard;verbal cues  -KW     Assistive Device (Gait) walker, front-wheeled  -KW     Distance in Feet (Gait) 160  -KW     Deviations/Abnormal Patterns (Gait) bilateral deviations;roman decreased;gait speed decreased;stride length decreased  -KW     Bilateral Gait Deviations forward flexed posture  -KW               User Key  (r) = Recorded By, (t) = Taken By, (c) = Cosigned By      Initials Name Provider Type    Daxa Agustin PT Physical Therapist                   Obj/Interventions    No documentation.                  Goals/Plan    No documentation.                  Clinical Impression       Row Name 10/05/23 0959          Pain    Pretreatment Pain Rating 0/10 - no pain  -KW       Row Name 10/05/23 0959          Plan of Care Review    Plan of Care Reviewed With patient   -KW     Progress improving  -KW     Outcome Evaluation Physical therapy treatment complete. The patient continues to require additional time to complete mobility. Patient improved tolerance to mobility and with decreased subjective complaints of pain. Patient increased ambulation distance compared to previous treatment session. She was able to stand for around 6 minutes with supervision. The patient continues to present below baseline for functional mobility. The patient would continue to benefit from skilled PT to address strength, balance and activity tolerance deficits. Continue to current PT POC  -KW       Row Name 10/05/23 0959          Vital Signs    Pre Systolic BP Rehab 113  -KW     Pre Treatment Diastolic BP 63  -KW     Pretreatment Heart Rate (beats/min) 52  -KW     Pre SpO2 (%) 95  -KW       Row Name 10/05/23 0959          Positioning and Restraints    Pre-Treatment Position in bed  -KW     Post Treatment Position chair  -KW     In Chair reclined;call light within reach;encouraged to call for assist  -KW               User Key  (r) = Recorded By, (t) = Taken By, (c) = Cosigned By      Initials Name Provider Type    Daxa Agustin, PT Physical Therapist                   Outcome Measures       Row Name 10/05/23 0959          How much help from another person do you currently need...    Turning from your back to your side while in flat bed without using bedrails? 3  -KW     Moving from lying on back to sitting on the side of a flat bed without bedrails? 3  -KW     Moving to and from a bed to a chair (including a wheelchair)? 3  -KW     Standing up from a chair using your arms (e.g., wheelchair, bedside chair)? 3  -KW     Climbing 3-5 steps with a railing? 3  -KW     To walk in hospital room? 3  -KW     AM-PAC 6 Clicks Score (PT) 18  -KW     Highest level of mobility 6 --> Walked 10 steps or more  -KW       Row Name 10/05/23 0959          Functional Assessment    Outcome Measure Options AM-PAC 6  Clicks Basic Mobility (PT)  -               User Key  (r) = Recorded By, (t) = Taken By, (c) = Cosigned By      Initials Name Provider Type    Daxa Agustin PT Physical Therapist                                 Physical Therapy Education       Title: PT OT SLP Therapies (In Progress)       Topic: Physical Therapy (In Progress)       Point: Mobility training (Done)       Learning Progress Summary             Patient Acceptance, E, VU,NR by  at 10/3/2023 0951    Comment: benefit of OOB mobility and dc planning   Family Acceptance, E, VU,NR by  at 10/3/2023 0951    Comment: benefit of OOB mobility and dc planning                         Point: Home exercise program (Not Started)       Learner Progress:  Not documented in this visit.              Point: Body mechanics (Done)       Learning Progress Summary             Patient Acceptance, E, VU,NR by  at 10/3/2023 0951    Comment: benefit of OOB mobility and dc planning   Family Acceptance, E, VU,NR by  at 10/3/2023 0951    Comment: benefit of OOB mobility and dc planning                         Point: Precautions (Done)       Learning Progress Summary             Patient Acceptance, E, VU,NR by  at 10/3/2023 0951    Comment: benefit of OOB mobility and dc planning   Family Acceptance, E, VU,NR by  at 10/3/2023 0951    Comment: benefit of OOB mobility and dc planning                                         User Key       Initials Effective Dates Name Provider Type [facility] 09/21/23 -  Fara Garcia PT Physical Therapist PT                  PT Recommendation and Plan     Plan of Care Reviewed With: patient  Progress: improving  Outcome Evaluation: Physical therapy treatment complete. The patient continues to require additional time to complete mobility. Patient improved tolerance to mobility and with decreased subjective complaints of pain. Patient increased ambulation distance compared to previous treatment session. She was able to  stand for around 6 minutes with supervision. The patient continues to present below baseline for functional mobility. The patient would continue to benefit from skilled PT to address strength, balance and activity tolerance deficits. Continue to current PT POC     Time Calculation:         PT Charges       Row Name 10/05/23 0959             Time Calculation    Start Time 0959  -KW      PT Received On 10/05/23  -KW         Timed Charges    50946 - Gait Training Minutes  16  -KW      09516 - PT Therapeutic Activity Minutes 11  -KW         Total Minutes    Timed Charges Total Minutes 27  -KW       Total Minutes 27  -KW                User Key  (r) = Recorded By, (t) = Taken By, (c) = Cosigned By      Initials Name Provider Type    Daxa Agustin PT Physical Therapist                  Therapy Charges for Today       Code Description Service Date Service Provider Modifiers Qty    60536635296 HC GAIT TRAINING EA 15 MIN 10/5/2023 Daxa Covington PT GP 1    84951133850 HC PT THERAPEUTIC ACT EA 15 MIN 10/5/2023 Daxa Covington PT GP 1            PT G-Codes  Outcome Measure Options: AM-PAC 6 Clicks Basic Mobility (PT)  AM-PAC 6 Clicks Score (PT): 18  AM-PAC 6 Clicks Score (OT): 19  PT Discharge Summary  Anticipated Discharge Disposition (PT):  (return to facility w/ HHPT)    Daxa Covington PT  10/5/2023

## 2023-10-05 NOTE — PLAN OF CARE
Problem: Adult Inpatient Plan of Care  Goal: Plan of Care Review  Outcome: Ongoing, Progressing  Flowsheets (Taken 10/5/2023 0411)  Plan of Care Reviewed With: patient  Goal: Patient-Specific Goal (Individualized)  Outcome: Ongoing, Progressing  Goal: Absence of Hospital-Acquired Illness or Injury  Outcome: Ongoing, Progressing  Intervention: Identify and Manage Fall Risk  Recent Flowsheet Documentation  Taken 10/5/2023 0300 by Esther Quintana RN  Safety Promotion/Fall Prevention:   activity supervised   assistive device/personal items within reach   clutter free environment maintained   fall prevention program maintained   lighting adjusted   nonskid shoes/slippers when out of bed   safety round/check completed   room organization consistent  Taken 10/4/2023 2305 by Esther Quintana RN  Safety Promotion/Fall Prevention:   activity supervised   assistive device/personal items within reach   clutter free environment maintained   fall prevention program maintained   lighting adjusted   nonskid shoes/slippers when out of bed   safety round/check completed   room organization consistent  Taken 10/4/2023 2000 by Esther Quintana RN  Safety Promotion/Fall Prevention:   activity supervised   assistive device/personal items within reach   clutter free environment maintained   fall prevention program maintained   lighting adjusted   nonskid shoes/slippers when out of bed   safety round/check completed   room organization consistent  Intervention: Prevent Skin Injury  Recent Flowsheet Documentation  Taken 10/5/2023 0300 by Esther Quintana RN  Body Position: position changed independently  Skin Protection:   adhesive use limited   incontinence pads utilized   tubing/devices free from skin contact  Taken 10/4/2023 2305 by Esther Quintana RN  Body Position: position changed independently  Taken 10/4/2023 2300 by Esther Quintana RN  Skin Protection:   adhesive use limited   incontinence pads utilized   tubing/devices free from skin  contact  Taken 10/4/2023 2000 by Esther Quintana RN  Body Position:   position changed independently   lower extremity elevated  Skin Protection:   adhesive use limited   incontinence pads utilized   tubing/devices free from skin contact  Intervention: Prevent and Manage VTE (Venous Thromboembolism) Risk  Recent Flowsheet Documentation  Taken 10/5/2023 0300 by Esther Quintana RN  Activity Management: activity encouraged  Taken 10/4/2023 2305 by Esther Quintana RN  Activity Management: activity encouraged  Taken 10/4/2023 2000 by Esther Quintana RN  Activity Management: activity encouraged  Intervention: Prevent Infection  Recent Flowsheet Documentation  Taken 10/5/2023 0300 by Esther Quintana RN  Infection Prevention:   environmental surveillance performed   rest/sleep promoted  Taken 10/4/2023 2305 by Esther Quintana RN  Infection Prevention:   environmental surveillance performed   rest/sleep promoted  Taken 10/4/2023 2000 by Esther Quintana RN  Infection Prevention:   environmental surveillance performed   rest/sleep promoted  Goal: Optimal Comfort and Wellbeing  Outcome: Ongoing, Progressing  Intervention: Provide Person-Centered Care  Recent Flowsheet Documentation  Taken 10/4/2023 2000 by Esther Quintana RN  Trust Relationship/Rapport:   care explained   choices provided   empathic listening provided   emotional support provided   questions answered   questions encouraged   reassurance provided   thoughts/feelings acknowledged  Goal: Readiness for Transition of Care  Outcome: Ongoing, Progressing     Problem: Fall Injury Risk  Goal: Absence of Fall and Fall-Related Injury  Outcome: Ongoing, Progressing  Intervention: Identify and Manage Contributors  Recent Flowsheet Documentation  Taken 10/5/2023 0300 by Esther Quintana RN  Medication Review/Management: medications reviewed  Taken 10/4/2023 2305 by Esther Quintana RN  Medication Review/Management: medications reviewed  Taken 10/4/2023 2000 by Esther Quintana RN  Medication  Review/Management: medications reviewed  Intervention: Promote Injury-Free Environment  Recent Flowsheet Documentation  Taken 10/5/2023 0300 by Esther Quintana RN  Safety Promotion/Fall Prevention:   activity supervised   assistive device/personal items within reach   clutter free environment maintained   fall prevention program maintained   lighting adjusted   nonskid shoes/slippers when out of bed   safety round/check completed   room organization consistent  Taken 10/4/2023 2305 by Esther Quintana, BENNIE  Safety Promotion/Fall Prevention:   activity supervised   assistive device/personal items within reach   clutter free environment maintained   fall prevention program maintained   lighting adjusted   nonskid shoes/slippers when out of bed   safety round/check completed   room organization consistent  Taken 10/4/2023 2000 by Esther Quintana RN  Safety Promotion/Fall Prevention:   activity supervised   assistive device/personal items within reach   clutter free environment maintained   fall prevention program maintained   lighting adjusted   nonskid shoes/slippers when out of bed   safety round/check completed   room organization consistent   Goal Outcome Evaluation:  Plan of Care Reviewed With: patient            Patient with no complaints this shift.  Sinus bradycardia throughout shift.  Patient rested throughout shift.  Bed alarm activated.  Q2 turn encouraged.  Bilateral heels elevated.

## 2023-10-06 VITALS
HEIGHT: 64 IN | RESPIRATION RATE: 18 BRPM | SYSTOLIC BLOOD PRESSURE: 113 MMHG | BODY MASS INDEX: 27.74 KG/M2 | DIASTOLIC BLOOD PRESSURE: 71 MMHG | HEART RATE: 65 BPM | OXYGEN SATURATION: 91 % | TEMPERATURE: 97.6 F | WEIGHT: 162.5 LBS

## 2023-10-06 PROBLEM — D50.9 IRON DEFICIENCY ANEMIA: Status: ACTIVE | Noted: 2023-01-01

## 2023-10-06 PROBLEM — R09.02 HYPOXIA: Status: RESOLVED | Noted: 2023-01-01 | Resolved: 2023-01-01

## 2023-10-06 PROBLEM — D50.9 IRON DEFICIENCY ANEMIA: Status: ACTIVE | Noted: 2023-10-06

## 2023-10-06 PROBLEM — I48.91 ATRIAL FIBRILLATION WITH RVR: Status: RESOLVED | Noted: 2023-10-01 | Resolved: 2023-10-06

## 2023-10-06 PROBLEM — I48.91 ATRIAL FIBRILLATION WITH RVR: Status: RESOLVED | Noted: 2023-01-01 | Resolved: 2023-01-01

## 2023-10-06 PROBLEM — R09.02 HYPOXIA: Status: RESOLVED | Noted: 2023-10-01 | Resolved: 2023-10-06

## 2023-10-06 LAB
ANION GAP SERPL CALCULATED.3IONS-SCNC: 9 MMOL/L (ref 5–15)
BACTERIA SPEC AEROBE CULT: NORMAL
BACTERIA SPEC AEROBE CULT: NORMAL
BUN SERPL-MCNC: 41 MG/DL (ref 8–23)
BUN/CREAT SERPL: 32 (ref 7–25)
CALCIUM SPEC-SCNC: 9.2 MG/DL (ref 8.2–9.6)
CHLORIDE SERPL-SCNC: 105 MMOL/L (ref 98–107)
CO2 SERPL-SCNC: 29 MMOL/L (ref 22–29)
CREAT SERPL-MCNC: 1.28 MG/DL (ref 0.57–1)
DEPRECATED RDW RBC AUTO: 43.5 FL (ref 37–54)
EGFRCR SERPLBLD CKD-EPI 2021: 39.9 ML/MIN/1.73
ERYTHROCYTE [DISTWIDTH] IN BLOOD BY AUTOMATED COUNT: 13.8 % (ref 12.3–15.4)
GLUCOSE SERPL-MCNC: 90 MG/DL (ref 65–99)
HCT VFR BLD AUTO: 31.8 % (ref 34–46.6)
HGB BLD-MCNC: 10 G/DL (ref 12–15.9)
MCH RBC QN AUTO: 27.4 PG (ref 26.6–33)
MCHC RBC AUTO-ENTMCNC: 31.4 G/DL (ref 31.5–35.7)
MCV RBC AUTO: 87.1 FL (ref 79–97)
PLATELET # BLD AUTO: 317 10*3/MM3 (ref 140–450)
PMV BLD AUTO: 10.3 FL (ref 6–12)
POTASSIUM SERPL-SCNC: 3.9 MMOL/L (ref 3.5–5.2)
RBC # BLD AUTO: 3.65 10*6/MM3 (ref 3.77–5.28)
SODIUM SERPL-SCNC: 143 MMOL/L (ref 136–145)
WBC NRBC COR # BLD: 6.75 10*3/MM3 (ref 3.4–10.8)

## 2023-10-06 PROCEDURE — 80048 BASIC METABOLIC PNL TOTAL CA: CPT | Performed by: STUDENT IN AN ORGANIZED HEALTH CARE EDUCATION/TRAINING PROGRAM

## 2023-10-06 PROCEDURE — 85027 COMPLETE CBC AUTOMATED: CPT | Performed by: STUDENT IN AN ORGANIZED HEALTH CARE EDUCATION/TRAINING PROGRAM

## 2023-10-06 RX ORDER — AMIODARONE HYDROCHLORIDE 200 MG/1
200 TABLET ORAL DAILY
Qty: 30 TABLET | Refills: 0 | Status: SHIPPED | OUTPATIENT
Start: 2023-10-19 | End: 2023-11-18

## 2023-10-06 RX ORDER — AMIODARONE HYDROCHLORIDE 200 MG/1
200 TABLET ORAL EVERY 12 HOURS SCHEDULED
Qty: 24 TABLET | Refills: 0 | Status: SHIPPED | OUTPATIENT
Start: 2023-10-06 | End: 2023-10-18

## 2023-10-06 RX ADMIN — Medication 10 ML: at 08:32

## 2023-10-06 RX ADMIN — AMIODARONE HYDROCHLORIDE 200 MG: 200 TABLET ORAL at 08:32

## 2023-10-06 RX ADMIN — SENNOSIDES AND DOCUSATE SODIUM 1 TABLET: 50; 8.6 TABLET ORAL at 08:32

## 2023-10-06 RX ADMIN — FUROSEMIDE 40 MG: 40 TABLET ORAL at 08:32

## 2023-10-06 RX ADMIN — APIXABAN 2.5 MG: 2.5 TABLET, FILM COATED ORAL at 08:32

## 2023-10-06 NOTE — CASE MANAGEMENT/SOCIAL WORK
Continued Stay Note  [facility]     Patient Name: Amanda Glover  MRN: [id number]  Today's Date: 10/6/2023    Admit Date: 10/1/2023    Plan: LTC   Discharge Plan       Row Name 10/06/23 0833       Plan    Plan LTC    Patient/Family in Agreement with Plan yes    Plan Comments I spoke with the patient and daughter at the bedside regarding possible discharge today back to [facility]. She remains on 2L NC and will need home oxygen set up at discharge. Her daughter stated that she is able to be transported in a [facility] WC van to the facility and it will need set up. She received a WC from [employer] at the bedside. [facility] follow up.    Final Discharge Disposition Code 04 - intermediate care facility                   Discharge Codes    No documentation.                 Expected Discharge Date and Time       Expected Discharge Date Expected Discharge Time    Oct 6, 2023               Nelly Neely RN

## 2023-10-06 NOTE — DISCHARGE SUMMARY
[facility] Medicine Services  DISCHARGE SUMMARY    Patient Name: Amanda Glover  : 10/31/1932  MRN: [id number]    Date of Admission: 10/1/2023 12:58 PM  Date of Discharge:  10/6/2023  Primary Care Physician: Denis Lr MD    Consults       Date and Time Order Name Status Description    10/1/2023  6:18 PM Inpatient Cardiology Consult Completed             Hospital Course     Presenting Problem: Atrial fibrillation with RVR    Active Hospital Problems    Diagnosis  POA    Iron deficiency anemia [D50.9]  Yes    Elevated troponin [R79.89]  Yes    History of DVT (deep vein thrombosis) -  [Z86.718]  Not Applicable    Alzheimer's dementia [G30.9, F02.80]  Unknown    CKD (chronic kidney disease), stage III [N18.30]  Unknown    Elevated serum creatinine [R79.89]  Unknown    Elevated transaminase level [R74.01]  Unknown    Community acquired pneumonia of right lung [J18.9]  Unknown    Aortic stenosis, moderate [I35.0]  Yes    Hypertension [I10]  Yes      Resolved Hospital Problems    Diagnosis Date Resolved POA    Atrial fibrillation with RVR [I48.91] 10/06/2023 Yes    Hypoxia [R09.02] 10/06/2023 Unknown          Hospital Course:  Amanda Glover is a 89 y.o. female with past medical history significant for hypertension, paroxysmal atrial fibrillation on Eliquis, aortic stenosis, CKD stage III, VTE, Alzheimer's dementia, who was admitted for atrial fibrillation with RVR, after presenting with shortness of breath and lethargy.  Cardiology was consulted; initial efforts with pharmacotherapy were unsuccessful in resolving her arrhythmia.  Patient subsequently underwent cardioversion on 10/4 with success.  She was then found to have sinus bradycardia but maintained normotensive pressures and beta-blockers were discontinued.  Patient's echocardiogram revealed progression of her aortic stenosis from moderate to severe, which will be addressed outpatient.  Her hospital course was complicated by the  development of acute hypoxic respiratory failure secondary to pneumonia and pleural effusion, which resolved by day of discharge.  She is treated with 5 days of Ceftin and doxycycline.  Patient stable for discharge, instructed to follow-up with PCP in 1 week and cardiology in 4 to 6 weeks.    Atrial fibrillation with RVR  Elevated troponin due to demand ischemia  Left pleural effusion  Moderate-Severe aortic stenosis  Moderate-Severe Tricuspid Regurgitation  History of hypertension  Hx PAF on Eliquis   -Cardiology consulted; rate control not optimized  -patient underwent successful cardioversion 10/4/23  -Bradycardia noted, discontinue metoprolol and start amiodarone              -Continue amiodarone 200mg q12h x2 weeks then daily   -F/u in 4-6 weeks   -TTE reviewed; AS progressed, preserved EF diurese as tolerated, appears euvolemic    -Follow up with Cardiology   -Strict I/Os  -Continue Eliquis 2.5mg BID      Pneumonia, RUL   Acute hypoxia   -Stable  -Completed 5-day course of doxycycline and ceftin   -Weaned off supplemental O2, oxygenating well on room air     MEÑO on CKD III  -Improved; monitor renal function while diuresing              -Transitioned to lasix from bumex   -Avoid nephrotoxic agents      Hypernatremia  -resolved      Acute anemia on anemia of chronic disease  -Monitor, no active signs of bleeding   -s/p Ferric gluconate infusion 10/2       Discharge Follow Up Recommendations for outpatient labs/diagnostics:  Follow-up with PCP in 1 week, CBC and BMP at that time  Follow-up with cardiology in 4 to 6 weeks    Day of Discharge     HPI:   Patient reports feeling well today.  She slept well overnight after trialing Seroquel.  Denies shortness of breath or chest pain.      Vital Signs:   Temp:  [97.2 °F (36.2 °C)-98.2 °F (36.8 °C)] 97.6 °F (36.4 °C)  Heart Rate:  [54-65] 65  Resp:  [18] 18  BP: (101-122)/(52-71) 113/71  Flow (L/min):  [2] 2      Physical Exam:  General appearance: alert, awake,  disoriented, no acute distress   Cardiovascular: Bradycardic, systolic murmur, radial pulse full 2/4 BL, trace bilateral lower extremity edema  Respiratory: CTAB, no crackles or wheezes, saturating 96% on room air  Abdomen: soft, non-tender, no organomegaly, bowel sounds normoactive     Pertinent  and/or Most Recent Results     LAB RESULTS:      Lab 10/06/23  0412 10/05/23  0356 10/04/23  0356 10/03/23  0346 10/02/23  0436 10/01/23  1307   WBC 6.75 7.21 7.79 7.30 9.27 11.46*   HEMOGLOBIN 10.0* 10.0* 10.3* 9.7* 9.6* 10.3*   HEMATOCRIT 31.8* 32.1* 33.8* 31.5* 30.8* 33.0*   PLATELETS 317 343 327 323 325 412   NEUTROS ABS  --  5.15  --  5.50  --  9.28*   IMMATURE GRANS (ABS)  --  0.10*  --  0.08*  --  0.07*   LYMPHS ABS  --  0.98  --  0.73  --  1.18   MONOS ABS  --  0.64  --  0.60  --  0.73   EOS ABS  --  0.30  --  0.35  --  0.18   MCV 87.1 88.2 89.2 86.3 88.0 88.0   PROCALCITONIN  --   --   --   --   --  0.10   LACTATE  --   --   --   --   --  1.8         Lab 10/06/23  0412 10/05/23  0356 10/04/23  0356 10/03/23  1805 10/03/23  0346 10/02/23  1045 10/02/23  0436 10/01/23  1307   SODIUM 143 140 138  --  140 145 144 146*   POTASSIUM 3.9 3.8 3.8 4.2 3.3* 3.8 3.8 3.7   CHLORIDE 105 102 100  --  101 101 103 104   CO2 29.0 27.0 28.0  --  30.0* 31.0* 28.0 29.0   ANION GAP 9.0 11.0 10.0  --  9.0 13.0 13.0 13.0   BUN 41* 41* 33*  --  39* 41* 43* 38*   CREATININE 1.28* 1.68* 1.24*  --  1.38* 1.53* 1.57* 1.55*   EGFR 39.9* 28.8* 41.4*  --  36.4* 32.2* 31.2* 31.7*   GLUCOSE 90 94 83  --  98 130* 98 124*   CALCIUM 9.2 9.4 8.6  --  8.5 9.4 9.0 9.2   MAGNESIUM  --   --  2.4  --  2.1  --   --  2.1   HEMOGLOBIN A1C  --   --   --   --   --   --  5.40  --    TSH  --   --   --   --   --   --   --  0.927         Lab 10/01/23  1307   TOTAL PROTEIN 6.9   ALBUMIN 3.4*   GLOBULIN 3.5   ALT (SGPT) 53*   AST (SGOT) 37*   BILIRUBIN 0.5   ALK PHOS 119*         Lab 10/01/23  1456 10/01/23  1307   PROBNP  --  19,889.0*   HSTROP T 93* 112*              Lab 10/02/23  0436   IRON 30*   IRON SATURATION (TSAT) 15*   TIBC 197*   TRANSFERRIN 132*         Brief Urine Lab Results       None          Microbiology Results (last 10 days)       Procedure Component Value - Date/Time    Blood Culture - Blood, Hand, Right [[id number]]  (Normal) Collected: 10/01/23 1458    Lab Status: Preliminary result Specimen: Blood from Hand, Right Updated: 10/05/23 1530     Blood Culture No growth at 4 days    COVID PRE-OP / PRE-PROCEDURE SCREENING ORDER (NO ISOLATION) - Swab, Nasopharynx [[id number]]  (Normal) Collected: 10/01/23 1457    Lab Status: Final result Specimen: Swab from Nasopharynx Updated: 10/01/23 3423    Narrative:      The following orders were created for panel order COVID PRE-OP / PRE-PROCEDURE SCREENING ORDER (NO ISOLATION) - Swab, Nasopharynx.  Procedure                               Abnormality         Status                     ---------                               -----------         ------                     Respiratory Panel PCR w/...[[id number]]  Normal              Final result                 Please view results for these tests on the individual orders.    Respiratory Panel PCR w/COVID-19(SARS-CoV-2) PHI/PERFECT[facility]/[place]/[facility] In-House, NP Swab in UTM/VTM, 3-4 HR TAT - Swab, Nasopharynx [[id number]]  (Normal) Collected: 10/01/23 1457    Lab Status: Final result Specimen: Swab from Nasopharynx Updated: 10/01/23 3374     ADENOVIRUS, PCR Not Detected     Coronavirus 229E Not Detected     Coronavirus HKU1 Not Detected     Coronavirus NL63 Not Detected     Coronavirus OC43 Not Detected     COVID19 Not Detected     Human Metapneumovirus Not Detected     Human Rhinovirus/Enterovirus Not Detected     Influenza A PCR Not Detected     Influenza B PCR Not Detected     Parainfluenza Virus 1 Not Detected     Parainfluenza Virus 2 Not Detected     Parainfluenza Virus 3 Not Detected     Parainfluenza Virus 4 Not Detected     RSV, PCR Not Detected     Bordetella pertussis pcr  Not Detected     Bordetella parapertussis PCR Not Detected     Chlamydophila pneumoniae PCR Not Detected     Mycoplasma pneumo by PCR Not Detected    Narrative:      In the setting of a positive respiratory panel with a viral infection PLUS a negative procalcitonin without other underlying concern for bacterial infection, consider observing off antibiotics or discontinuation of antibiotics and continue supportive care. If the respiratory panel is positive for atypical bacterial infection (Bordetella pertussis, Chlamydophila pneumoniae, or Mycoplasma pneumoniae), consider antibiotic de-escalation to target atypical bacterial infection.    Blood Culture - Blood, Arm, Right [[id number]]  (Normal) Collected: 10/01/23 1400    Lab Status: Preliminary result Specimen: Blood from Arm, Right Updated: 10/05/23 1530     Blood Culture No growth at 4 days            Adult Transthoracic Echo Complete W/ Cont if Necessary Per Protocol    Result Date: 10/2/2023    Left ventricular systolic function is normal. Left ventricular ejection fraction appears to be 51 - 55%.   Left ventricular wall thickness is consistent with mild concentric hypertrophy.   Mild aortic valve regurgitation is present.   Moderate to severe aortic valve stenosis is present.  Gradients are variable secondary to atrial fibrillation.  Mean  gradient average 30-35 mmHg   Moderate to severe tricuspid valve regurgitation is present.   Estimated right ventricular systolic pressure from tricuspid regurgitation is mildly elevated (35-45 mmHg).   There is a left pleural effusion.     XR Chest 1 View    Result Date: 10/4/2023  XR CHEST 1 VW Date of Exam: 10/4/2023 8:58 AM EDT Indication: f/u hypoxia Comparison: 10/1/2023 Findings: There is continued extensive right upper lobe pneumonia. Previously noted right basilar opacities have largely resolved. There may be a minimal right effusion. There is stable cardiomegaly. There are low lung volumes.     Impression: Continued  right upper lobe pneumonia. Improved appearance of the right lower lobe. Electronically Signed: Jyoti Gaitan MD  10/4/2023 9:57 AM EDT  Workstation ID: [id number]    XR Chest 1 View    Result Date: 10/1/2023  XR CHEST 1 VW Date of Exam: 10/1/2023 1:10 PM EDT Indication: Dysrhythmia triage protocol Comparison: Chest x-ray 3/16/2020 Findings: There is mild cardiomegaly. There are groundglass opacities throughout the right lung, worse in the right upper lobe. No significant pleural effusion. No pneumothorax. No acute osseous findings.     Impression: Groundglass opacities throughout the right lung compatible with infection. Electronically Signed: Silas Looney MD  10/1/2023 1:55 PM EDT  Workstation ID: [id number]     Results for orders placed during the hospital encounter of 03/16/20    Duplex Venous Lower Extremity - Bilateral CAR    Interpretation Summary  · Sub-acute right lower extremity deep vein thrombosis noted in the popliteal, posterior tibial and peroneal veins  · Sub-acute left lower extremity deep vein thrombosis noted in the popliteal and peroneal veins      Results for orders placed during the hospital encounter of 03/16/20    Duplex Venous Lower Extremity - Bilateral CAR    Interpretation Summary  · Sub-acute right lower extremity deep vein thrombosis noted in the popliteal, posterior tibial and peroneal veins  · Sub-acute left lower extremity deep vein thrombosis noted in the popliteal and peroneal veins      Results for orders placed during the hospital encounter of 10/01/23    Adult Transthoracic Echo Complete W/ Cont if Necessary Per Protocol    Interpretation Summary    Left ventricular systolic function is normal. Left ventricular ejection fraction appears to be 51 - 55%.    Left ventricular wall thickness is consistent with mild concentric hypertrophy.    Mild aortic valve regurgitation is present.    Moderate to severe aortic valve stenosis is present.  Gradients are variable secondary to atrial  fibrillation.  Mean  gradient average 30-35 mmHg    Moderate to severe tricuspid valve regurgitation is present.    Estimated right ventricular systolic pressure from tricuspid regurgitation is mildly elevated (35-45 mmHg).    There is a left pleural effusion.      Pending Labs       Order Current Status    Blood Culture - Blood, Arm, Right Preliminary result    Blood Culture - Blood, Hand, Right Preliminary result          Discharge Details        Discharge Medications        New Medications        Instructions Start Date   amiodarone 200 MG tablet  Commonly known as: PACERONE   200 mg, Oral, Every 12 Hours Scheduled      amiodarone 200 MG tablet  Commonly known as: Pacerone   200 mg, Oral, Daily   Start Date: October 19, 2023            Changes to Medications        Instructions Start Date   apixaban 2.5 MG tablet tablet  Commonly known as: ELIQUIS  What changed:   medication strength  how much to take  when to take this   2.5 mg, Oral, 2 Times Daily             Continue These Medications        Instructions Start Date   acetaminophen 500 MG tablet  Commonly known as: TYLENOL   500 mg, Oral, Every 6 Hours PRN      furosemide 40 MG tablet  Commonly known as: LASIX   40 mg, Oral, Daily      sennosides-docusate 8.6-50 MG per tablet  Commonly known as: PERICOLACE   1 tablet, Oral, Daily             Stop These Medications      benazepril 20 MG tablet  Commonly known as: LOTENSIN     doxycycline 100 MG tablet  Commonly known as: VIBRAMYICN     hydrALAZINE 50 MG tablet  Commonly known as: APRESOLINE     metOLazone 2.5 MG tablet  Commonly known as: ZAROXOLYN     metoprolol tartrate 25 MG tablet  Commonly known as: LOPRESSOR     potassium chloride 20 MEQ CR tablet  Commonly known as: K-DUR,KLOR-CON              Allergies   Allergen Reactions    Influenza Vaccine Live Other (See Comments)     Patient unsure         Discharge Disposition:  Long Term Care (DC - External)    Diet:  Hospital:  Diet Order   Procedures    Diet:  Regular/House Diet, Cardiac Diets; Healthy Heart (2-3 Na+); Texture: Regular Texture (IDDSI 7); Fluid Consistency: Thin (IDDSI 0)       Diet Instructions       Diet: Cardiac Diets; Healthy Heart (2-3 Na+); Regular Texture (IDDSI 7); Thin (IDDSI 0)      Discharge Diet: Cardiac Diets    Cardiac Diet: Healthy Heart (2-3 Na+)    Texture: Regular Texture (IDDSI 7)    Fluid Consistency: Thin (IDDSI 0)             Activity:  Activity Instructions       Activity as Tolerated              Restrictions or Other Recommendations:         CODE STATUS:    Code Status and Medical Interventions:   Ordered at: 10/01/23 1632     Medical Intervention Limits:    NO intubation (DNI)     Level Of Support Discussed With:    Health Care Surrogate     Code Status (Patient has no pulse and is not breathing):    No CPR (Do Not Attempt to Resuscitate)     Medical Interventions (Patient has pulse or is breathing):    Limited Support       No future appointments.    Additional Instructions for the Follow-ups that You Need to Schedule       Discharge Follow-up with PCP   As directed       Currently Documented PCP:    Denis Lr MD    PCP Phone Number:    [phone]     Follow Up Details: 1 week        Discharge Follow-up with Specified Provider: Dr. Chand (Cardiology); 1 Month   As directed      To: Dr. Chand (Cardiology)   Follow Up: 1 Month                      John Israel DO  10/06/23      Time Spent on Discharge:  I spent  35  minutes on this discharge activity which included: face-to-face encounter with the patient, reviewing the data in the system, coordination of the care with the nursing staff as well as consultants, documentation, and entering orders.
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
## 2023-10-06 NOTE — PLAN OF CARE
Problem: Adult Inpatient Plan of Care  Goal: Plan of Care Review  Outcome: Ongoing, Progressing  Goal: Patient-Specific Goal (Individualized)  Outcome: Ongoing, Progressing  Goal: Absence of Hospital-Acquired Illness or Injury  Outcome: Ongoing, Progressing  Intervention: Identify and Manage Fall Risk  Recent Flowsheet Documentation  Taken 10/6/2023 0200 by Danni Zhou RN  Safety Promotion/Fall Prevention:   activity supervised   assistive device/personal items within reach   clutter free environment maintained   fall prevention program maintained   nonskid shoes/slippers when out of bed   room organization consistent   safety round/check completed   toileting scheduled  Taken 10/6/2023 0000 by Danni Zhou RN  Safety Promotion/Fall Prevention:   assistive device/personal items within reach   activity supervised   clutter free environment maintained   fall prevention program maintained   nonskid shoes/slippers when out of bed   room organization consistent   safety round/check completed   toileting scheduled  Taken 10/5/2023 2200 by Danni Zhuo, RN  Safety Promotion/Fall Prevention:   clutter free environment maintained   activity supervised   assistive device/personal items within reach   fall prevention program maintained   nonskid shoes/slippers when out of bed   room organization consistent   safety round/check completed   toileting scheduled  Taken 10/5/2023 2000 by Danni Zhou RN  Safety Promotion/Fall Prevention:   activity supervised   assistive device/personal items within reach   clutter free environment maintained   fall prevention program maintained   nonskid shoes/slippers when out of bed   room organization consistent   toileting scheduled   safety round/check completed  Intervention: Prevent Skin Injury  Recent Flowsheet Documentation  Taken 10/6/2023 0200 by Danni Zhou, RN  Body Position: position changed independently  Skin Protection:   adhesive use limited    tubing/devices free from skin contact  Taken 10/6/2023 0000 by Danni Zhou RN  Body Position: position changed independently  Skin Protection:   adhesive use limited   tubing/devices free from skin contact   incontinence pads utilized  Taken 10/5/2023 2200 by Danni Zhou RN  Body Position: position changed independently  Taken 10/5/2023 2000 by Danni Zhou RN  Body Position: position changed independently  Skin Protection:   adhesive use limited   incontinence pads utilized   tubing/devices free from skin contact  Intervention: Prevent and Manage VTE (Venous Thromboembolism) Risk  Recent Flowsheet Documentation  Taken 10/6/2023 0200 by Danni Zhou, RN  Activity Management: activity encouraged  Taken 10/6/2023 0000 by Danni Zhou RN  Activity Management: activity encouraged  Taken 10/5/2023 2200 by Danni Zhou RN  Activity Management: activity encouraged  Taken 10/5/2023 2000 by Danni Zhou RN  Activity Management: activity encouraged  VTE Prevention/Management: (see MAR) other (see comments)  Intervention: Prevent Infection  Recent Flowsheet Documentation  Taken 10/6/2023 0200 by Danni Zhou, RN  Infection Prevention:   environmental surveillance performed   hand hygiene promoted   rest/sleep promoted   single patient room provided  Taken 10/6/2023 0000 by Danni Zhou RN  Infection Prevention:   environmental surveillance performed   hand hygiene promoted   rest/sleep promoted   single patient room provided  Taken 10/5/2023 2200 by Danni Zhou RN  Infection Prevention:   environmental surveillance performed   hand hygiene promoted   rest/sleep promoted   single patient room provided  Taken 10/5/2023 2000 by Danni Zhou RN  Infection Prevention:   environmental surveillance performed   hand hygiene promoted   rest/sleep promoted   single patient room provided  Goal: Optimal Comfort and Wellbeing  Outcome: Ongoing, Progressing  Intervention:  Provide Person-Centered Care  Recent Flowsheet Documentation  Taken 10/5/2023 2000 by Danni Zhou, RN  Trust Relationship/Rapport:   care explained   choices provided   emotional support provided   empathic listening provided   questions answered   questions encouraged   reassurance provided   thoughts/feelings acknowledged  Goal: Readiness for Transition of Care  Outcome: Ongoing, Progressing     Problem: Fall Injury Risk  Goal: Absence of Fall and Fall-Related Injury  Outcome: Ongoing, Progressing  Intervention: Identify and Manage Contributors  Recent Flowsheet Documentation  Taken 10/5/2023 2000 by Danni Zhou, RN  Medication Review/Management: medications reviewed  Intervention: Promote Injury-Free Environment  Recent Flowsheet Documentation  Taken 10/6/2023 0200 by Danni Zhou, RN  Safety Promotion/Fall Prevention:   activity supervised   assistive device/personal items within reach   clutter free environment maintained   fall prevention program maintained   nonskid shoes/slippers when out of bed   room organization consistent   safety round/check completed   toileting scheduled  Taken 10/6/2023 0000 by Danni Zhou, RN  Safety Promotion/Fall Prevention:   assistive device/personal items within reach   activity supervised   clutter free environment maintained   fall prevention program maintained   nonskid shoes/slippers when out of bed   room organization consistent   safety round/check completed   toileting scheduled  Taken 10/5/2023 2200 by Danni Zhou, RN  Safety Promotion/Fall Prevention:   clutter free environment maintained   activity supervised   assistive device/personal items within reach   fall prevention program maintained   nonskid shoes/slippers when out of bed   room organization consistent   safety round/check completed   toileting scheduled  Taken 10/5/2023 2000 by Danni Zhou, RN  Safety Promotion/Fall Prevention:   activity supervised   assistive  device/personal items within reach   clutter free environment maintained   fall prevention program maintained   nonskid shoes/slippers when out of bed   room organization consistent   toileting scheduled   safety round/check completed     Problem: Skin Injury Risk Increased  Goal: Skin Health and Integrity  Outcome: Ongoing, Progressing  Intervention: Optimize Skin Protection  Recent Flowsheet Documentation  Taken 10/6/2023 0200 by Danni Zhou, RN  Pressure Reduction Techniques:   frequent weight shift encouraged   weight shift assistance provided  Head of Bed (HOB) Positioning: HOB at 20-30 degrees  Pressure Reduction Devices: pressure-redistributing mattress utilized  Skin Protection:   adhesive use limited   tubing/devices free from skin contact  Taken 10/6/2023 0000 by Danni Zhou, RN  Pressure Reduction Techniques: frequent weight shift encouraged  Head of Bed (HOB) Positioning: HOB at 20-30 degrees  Pressure Reduction Devices: pressure-redistributing mattress utilized  Skin Protection:   adhesive use limited   tubing/devices free from skin contact   incontinence pads utilized  Taken 10/5/2023 2200 by Danni Zhou, RN  Head of Bed (HOB) Positioning: HOB at 20-30 degrees  Taken 10/5/2023 2000 by Danni Zhou, RN  Pressure Reduction Techniques: frequent weight shift encouraged  Head of Bed (HOB) Positioning: HOB at 20-30 degrees  Pressure Reduction Devices: pressure-redistributing mattress utilized  Skin Protection:   adhesive use limited   incontinence pads utilized   tubing/devices free from skin contact   Goal Outcome Evaluation:

## 2023-10-06 NOTE — DISCHARGE PLACEMENT REQUEST
"Selwyn Glover (89 y.o. Female)   [facility]  From [facility]       Date of Birth   10/31/1932    Social Security Number       Address   [address] [address]    Home Phone   [phone]    MRN   [id number]       P[employer]ec[employer]alism   M[employer]on    Marital Status                               Admission Date   10/1/23    Admission Type   Emergency    Admitting Provider   John Israel DO    Attending Provider   John Israel DO    Department, Room/Bed   [facility] 5H, S582/1       Discharge Date       Discharge Disposition   Long Term Care (DC - External)    Discharge Destination                                 Attending Provider: John Israel DO    Allergies: Influenza Vaccine Live    Isolation: None   Infection: None   Code Status: No CPR    Ht: 162.6 cm (64\")   Wt: 73.7 kg (162 lb 8 oz)    Admission Cmt: None   Principal Problem: None                  Active Insurance as of 10/1/2023       Primary Coverage       Payor Plan Insurance Group Employer/Plan Group    MEDICARE MEDICARE A & B        Payor Plan Address Payor Plan Phone Number Payor Plan Fax Number Effective Dates    [address] [phone]  10/1/1997 - None Entered    [place] [id number]         Subscriber Name Subscriber Birth Date Member ID       SELWYN GLOVER 10/31/1932 [id number]                     Emergency Contacts        (Rel.) Home Phone Work Phone Mobile Phone    RADHA OCAMPO (Daughter) [phone] -- --    MARISSAISHAN (Daughter) [phone] -- --                 Physical Therapy Notes (most recent note)        Daxa Covington, PT at 10/05/23 0959  Version 1 of 1         Goal Outcome Evaluation:  Plan of Care Reviewed With: patient        Progress: improving  Outcome Evaluation: Physical therapy treatment complete. The patient continues to require additional time to complete mobility. Patient improved tolerance to mobility and with decreased subjective " complaints of pain. Patient increased ambulation distance compared to previous treatment session. She was able to stand for around 6 minutes with supervision. The patient continues to present below baseline for functional mobility. The patient would continue to benefit from skilled PT to address strength, balance and activity tolerance deficits. Continue to current PT POC      Anticipated Discharge Disposition (PT):  (return to facility w/ HHPT)    Electronically signed by Daxa Covington, PT at 10/05/23 1627          Occupational Therapy Notes (most recent note)        Izabela Acuña, OT at 10/03/23 0915          Patient Name: Amanda Glover  : 10/31/1932    MRN: [id number]                              Today's Date: 10/3/2023       Admit Date: 10/1/2023    Visit Dx:     ICD-10-CM ICD-9-CM   1. Atrial fibrillation with rapid ventricular response  I48.91 427.31   2. Elevated troponin  R79.89 790.6   3. Shortness of breath  R06.02 786.05   4. Abnormal chest x-ray  R93.89 793.2   5. Acute congestive heart failure, unspecified heart failure type  I50.9 428.0   6. Elevated serum creatinine  R79.89 790.99   7. New onset a-fib  I48.91 427.31   8. Acute deep vein thrombosis (DVT) of popliteal vein of both lower extremities  I82.433 453.41   9. Aortic valve stenosis, mild  I35.0 424.1   10. Atrial fibrillation with RVR  I48.91 427.31   11. History of DVT (deep vein thrombosis) -   Z86.718 V12.51   12. Elevated transaminase level  R74.01 790.4   13. Hypoxia  R09.02 799.02     Patient Active Problem List   Diagnosis    New onset a-fib    Acute deep vein thrombosis (DVT) of popliteal vein of both lower extremities    Hypertension    Aortic valve stenosis, mild    Atrial fibrillation with RVR    Elevated troponin    History of DVT (deep vein thrombosis) -     Alzheimer's dementia    CKD (chronic kidney disease), stage III    Elevated serum creatinine    Elevated transaminase level    Community acquired pneumonia of  right lung    Hypoxia     Past Medical History:   Diagnosis Date    Aortic valve stenosis     Atrial fibrillation     Deep vein thrombosis     Dementia     Hyperlipidemia     Hypertension     Murmur     Osteoporosis      Past Surgical History:   Procedure Laterality Date    APPENDECTOMY      FEMUR FRACTURE SURGERY Left 01/2020    HERNIA REPAIR      HYSTERECTOMY        General Information       Row Name 10/03/23 0915          OT Time and Intention    Document Type evaluation  -     Mode of Treatment occupational therapy  -[facility] Name 10/03/23 0915          General Information    Patient Profile Reviewed yes  -     Prior Level of Function independent:;all household mobility;feeding;grooming;dressing;min assist:;bathing  -     Existing Precautions/Restrictions fall;oxygen therapy device and L/min  -     Barriers to Rehab cognitive status  -       Row Name 10/03/23 0915          Occupational Profile    Environmental Supports and Barriers (Occupational Profile) LIves in a personal care facility.  Uses RW for mobility.  -[facility] Name 10/03/23 0915          Living Environment    People in Home facility resident;alone  Personal care facility.  -[facility] Name 10/03/23 0915          Home Main Entrance    Number of Stairs, Main Entrance none  -[facility] Name 10/03/23 0915          Cognition    Orientation Status (Cognition) oriented to;person  -[facility] Name 10/03/23 0915          Safety Issues, Functional Mobility    Safety Issues Affecting Function (Mobility) awareness of need for assistance;insight into deficits/self-awareness;safety precaution awareness;safety precautions follow-through/compliance  -     Impairments Affecting Function (Mobility) balance;endurance/activity tolerance;shortness of breath;strength  -               User Key  (r) = Recorded By, (t) = Taken By, (c) = Cosigned By      Initials Name Provider Type    SW Izabela Acuña OT Occupational Therapist                      Mobility/ADL's       Row Name 10/03/23 0915          Bed Mobility    Bed Mobility supine-sit  -SW     Supine-Sit Manning (Bed Mobility) set up;verbal cues  -     Assistive Device (Bed Mobility) head of bed elevated;bed rails  -       Row Name 10/03/23 0915          Transfers    Transfers bed-chair transfer;sit-stand transfer  -       Row Name 10/03/23 0915          Bed-Chair Transfer    Bed-Chair Manning (Transfers) contact guard;verbal cues  -SW     Assistive Device (Bed-Chair Transfers) walker, front-wheeled  -SW       Row Name 10/03/23 0915          Sit-Stand Transfer    Sit-Stand Manning (Transfers) contact guard;verbal cues  -SW     Assistive Device (Sit-Stand Transfers) walker, front-wheeled  -SW       Row Name 10/03/23 0915          Functional Mobility    Functional Mobility- Ind. Level contact guard assist;verbal cues required  -SW     Functional Mobility- Device walker, front-wheeled  -SW     Functional Mobility-Distance (Feet) 80  -SW     Functional Mobility- Safety Issues supplemental O2  -       Row Name 10/03/23 0915          Activities of Daily Living    BADL Assessment/Intervention lower body dressing;grooming  -[facility] Name 10/03/23 0915          Lower Body Dressing Assessment/Training    Manning Level (Lower Body Dressing) lower body dressing skills;socks;minimum assist (75% patient effort)  -SW     Position (Lower Body Dressing) unsupported sitting  -[facility] Name 10/03/23 0915          Grooming Assessment/Training    Manning Level (Grooming) grooming skills;wash face, hands;set up  -SW     Position (Grooming) supported sitting  -               User Key  (r) = Recorded By, (t) = Taken By, (c) = Cosigned By      Initials Name Provider Type    Izabela Barroso OT Occupational Therapist                   Obj/Interventions       Row Name 10/03/23 0915          Sensory Assessment (Somatosensory)    Sensory Assessment (Somatosensory) UE sensation intact  -        [facility] Name 10/03/23 0915          Vision Assessment/Intervention    Visual Impairment/Limitations WFL  -[facility] Name 10/03/23 0915          Range of Motion Comprehensive    General Range of Motion bilateral upper extremity ROM WFL  -[facility] Name 10/03/23 0915          Strength Comprehensive (MMT)    General Manual Muscle Testing (MMT) Assessment upper extremity strength deficits identified  -     Comment, General Manual Muscle Testing (MMT) Assessment RUE 3+/5, LUE 4/5 (family reports hx of injury to RUE)..  -[facility] Name 10/03/23 0915          Motor Skills    Motor Skills functional endurance  -     Functional Endurance SOA with activity and mobility.  -[facility] Name 10/03/23 0915          Balance    Balance Assessment sitting static balance;sitting dynamic balance;sit to stand dynamic balance;standing static balance;standing dynamic balance  -     Static Sitting Balance standby assist  -     Dynamic Sitting Balance standby assist  -     Position, Sitting Balance unsupported;sitting edge of bed  -     Sit to Stand Dynamic Balance contact guard;verbal cues  -     Static Standing Balance contact guard;verbal cues  -     Dynamic Standing Balance minimal assist;verbal cues  -     Position/Device Used, Standing Balance supported;walker, front-wheeled  -     Balance Interventions sitting;standing;sit to stand;supported;static;dynamic;minimal challenge;occupation based/functional task  -               User Key  (r) = Recorded By, (t) = Taken By, (c) = Cosigned By      Initials Name Provider Type    Izabela Barroso OT Occupational Therapist                   Goals/Plan       [facility] Name 10/03/23 0915          Transfer Goal 1 (OT)    Activity/Assistive Device (Transfer Goal 1, OT) toilet  -     Cassel Level/Cues Needed (Transfer Goal 1, OT) standby assist  -     Time Frame (Transfer Goal 1, OT) long term goal (LTG);by discharge  -     Progress/Outcome (Transfer Goal 1,  OT) goal ongoing  -[facility] Name 10/03/23 0915          Dressing Goal 1 (OT)    Activity/Device (Dressing Goal 1, OT) lower body dressing  -     Jelm/Cues Needed (Dressing Goal 1, OT) set-up required  -     Time Frame (Dressing Goal 1, OT) long term goal (LTG);by discharge  -     Progress/Outcome (Dressing Goal 1, OT) goal ongoing  -[facility] Name 10/03/23 0915          Therapy Assessment/Plan (OT)    Planned Therapy Interventions (OT) activity tolerance training;adaptive equipment training;BADL retraining;functional balance retraining;transfer/mobility retraining;strengthening exercise;patient/caregiver education/training  -               User Key  (r) = Recorded By, (t) = Taken By, (c) = Cosigned By      Initials Name Provider Type    Izabela Barroso OT Occupational Therapist                   Clinical Impression       [facility] Name 10/03/23 0915          Pain Assessment    Pretreatment Pain Rating 0/10 - no pain  -     Posttreatment Pain Rating 0/10 - no pain  -[facility] Name 10/03/23 0915          Plan of Care Review    Plan of Care Reviewed With patient;daughter  -     Outcome Evaluation OT eval complete. Pt presents with soa with exertion, weakness, confusion, and overall decline in act moira.  Pt setup for supine to sit, cga for sts and mob with rw, min assist for donning socks, and setup for grooming.  Recommend IPOT and return to facility with OT services through .  -       Row Name 10/03/23 0915          Therapy Assessment/Plan (OT)    Rehab Potential (OT) good, to achieve stated therapy goals  -     Criteria for Skilled Therapeutic Interventions Met (OT) yes;meets criteria;skilled treatment is necessary  -     Therapy Frequency (OT) daily  -       Row Name 10/03/23 0915          Therapy Plan Review/Discharge Plan (OT)    Anticipated Discharge Disposition (OT) home with home health  -[facility] Name 10/03/23 0915          Vital Signs    Pre Systolic BP Rehab 106  -      Pre Treatment Diastolic BP 76  -SW     Intra Systolic BP Rehab 98  -SW     Intra Treatment Diastolic BP 86  -SW     Pretreatment Heart Rate (beats/min) 115  -SW     Intratreatment Heart Rate (beats/min) 141  -SW     Posttreatment Heart Rate (beats/min) 117  -SW     Pre SpO2 (%) 95  -SW     O2 Delivery Pre Treatment supplemental O2  -SW     Intra SpO2 (%) 87  -SW     O2 Delivery Intra Treatment supplemental O2  -SW     Post SpO2 (%) 94  -SW     O2 Delivery Post Treatment supplemental O2  -SW     Pre Patient Position Supine  -SW     Intra Patient Position Standing  -SW     Post Patient Position Sitting  -SW       Row Name 10/03/23 0915          Positioning and Restraints    Pre-Treatment Position in bed  -SW     Post Treatment Position chair  -SW     In Chair notified nsg;reclined;sitting;call light within reach;encouraged to call for assist;exit alarm on;with family/caregiver;waffle cushion;legs elevated  -SW               User Key  (r) = Recorded By, (t) = Taken By, (c) = Cosigned By      Initials Name Provider Type    Izabela Barroso, OT Occupational Therapist                   Outcome Measures       Row Name 10/03/23 1005          How much help from another is currently needed...    Putting on and taking off regular lower body clothing? 3  -SW     Bathing (including washing, rinsing, and drying) 3  -SW     Toileting (which includes using toilet bed pan or urinal) 3  -SW     Putting on and taking off regular upper body clothing 3  -SW     Taking care of personal grooming (such as brushing teeth) 3  -SW     Eating meals 4  -SW     AM-PAC 6 Clicks Score (OT) 19  -SW       Row Name 10/03/23 0912          How much help from another person do you currently need...    Turning from your back to your side while in flat bed without using bedrails? 3  -LH     Moving from lying on back to sitting on the side of a flat bed without bedrails? 3  -LH     Moving to and from a bed to a chair (including a wheelchair)? 3  -LH      Standing up from a chair using your arms (e.g., wheelchair, bedside chair)? 3  -LH     Climbing 3-5 steps with a railing? 2  -     To walk in hospital room? 3  -LH     AM-PAC 6 Clicks Score (PT) 17  -     Highest level of mobility 5 --> Static standing  -       Row Name 10/03/23 1005 10/03/23 0950       Functional Assessment    Outcome Measure Options AM-PAC 6 Clicks Daily Activity (OT)  - AM-PAC 6 Clicks Basic Mobility (PT)  -              User Key  (r) = Recorded By, (t) = Taken By, (c) = Cosigned By      Initials Name Provider Type    Izabela Barroso OT Occupational Therapist     Fara Garcia, PT Physical Therapist                    Occupational Therapy Education       Title: PT OT SLP Therapies (In Progress)       Topic: Occupational Therapy (In Progress)       Point: ADL training (In Progress)       Description:   Instruct learner(s) on proper safety adaptation and remediation techniques during self care or transfers.   Instruct in proper use of assistive devices.                  Learning Progress Summary             Patient Acceptance, E,D, NR by  at 10/3/2023 1006   Family Acceptance, E,D, NR by  at 10/3/2023 1006                         Point: Home exercise program (Not Started)       Description:   Instruct learner(s) on appropriate technique for monitoring, assisting and/or progressing therapeutic exercises/activities.                  Learner Progress:  Not documented in this visit.              Point: Precautions (In Progress)       Description:   Instruct learner(s) on prescribed precautions during self-care and functional transfers.                  Learning Progress Summary             Patient Acceptance, E,D, NR by  at 10/3/2023 1006   Family Acceptance, E,D, NR by  at 10/3/2023 1006                         Point: Body mechanics (Not Started)       Description:   Instruct learner(s) on proper positioning and spine alignment during self-care, functional mobility activities  and/or exercises.                  Learner Progress:  Not documented in this visit.                              User Key       Initials Effective Dates Name Provider Type Discipline     06/16/21 -  Izabela Acuña OT Occupational Therapist OT                  OT Recommendation and Plan  Planned Therapy Interventions (OT): activity tolerance training, adaptive equipment training, BADL retraining, functional balance retraining, transfer/mobility retraining, strengthening exercise, patient/caregiver education/training  Therapy Frequency (OT): daily  Plan of Care Review  Plan of Care Reviewed With: patient, daughter  Outcome Evaluation: OT eval complete. Pt presents with soa with exertion, weakness, confusion, and overall decline in act moira.  Pt setup for supine to sit, cga for sts and mob with rw, min assist for donning socks, and setup for grooming.  Recommend IPOT and return to facility with OT services through .     Time Calculation:   Evaluation Complexity (OT)  Review Occupational Profile/Medical/Therapy History Complexity: brief/low complexity  Assessment, Occupational Performance/Identification of Deficit Complexity: 3-5 performance deficits  Clinical Decision Making Complexity (OT): problem focused assessment/low complexity  Overall Complexity of Evaluation (OT): low complexity     Time Calculation- OT       Row Name 10/03/23 0915             Time Calculation- OT    OT Start Time 0915  -SW      OT Received On 10/03/23  -SW      OT Goal Re-Cert Due Date 10/13/23  -SW         Timed Charges    90593 - OT Self Care/Mgmt Minutes 15  -SW         Untimed Charges    OT Eval/Re-eval Minutes 38  -SW         Total Minutes    Timed Charges Total Minutes 15  -SW      Untimed Charges Total Minutes 38  -SW       Total Minutes 53  -SW                User Key  (r) = Recorded By, (t) = Taken By, (c) = Cosigned By      Initials Name Provider Type     Izabela Acuña OT Occupational Therapist                  Therapy Charges for  Today       Code Description Service Date Service Provider Modifiers Qty    61537478700 HC OT SELF CARE/MGMT/TRAIN EA 15 MIN 10/3/2023 Izabela Acuña OT GO 1    27685932995 HC OT EVAL LOW COMPLEXITY 3 10/3/2023 Izabela Acuña OT GO 1                 Izabela Acuña OT  10/3/2023    Electronically signed by Izabela Acuña OT at 10/03/23 1005

## 2023-10-06 NOTE — CASE MANAGEMENT/SOCIAL WORK
Case Management Discharge Note      Final Note: I spoke with the daughter on the phone and another daughter at the bedside regarding this patient being discharge back to [facility] today. They are in agreement with this plan. CM called Philippe with [facility] to notify. CM to fax over the D/C Summary when it becomes available and the patient's latest PT and OT notes as she will be receiving PT and OT at the facility. [employer] [facility] van has been set up for 1300 today. The  was notified to please go to the patient's room to complete an EMS/DNR form with the daughter and place it in the patient's chart. A WC was secured and at the bedside through [employer]. This patient no longer is on oxygen. The family has no other discharge planning needs at this time. RN to please call report to  and ask for [address] after the D/C Summary has been faxed over to .         Selected Continued Care - Admitted Since 10/1/2023       Destination    No services have been selected for the patient.                Durable Medical Equipment    No services have been selected for the patient.                Dialysis/Infusion    No services have been selected for the patient.                Home Medical Care    No services have been selected for the patient.                Therapy    No services have been selected for the patient.                Community Resources    No services have been selected for the patient.                Community & DME    No services have been selected for the patient.                         Final Discharge Disposition Code: 04 - intermediate care facility

## 2023-10-09 LAB
QT INTERVAL: 460 MS
QTC INTERVAL: 415 MS

## 2023-10-24 ENCOUNTER — OFFICE VISIT (OUTPATIENT)
Dept: CARDIOLOGY | Facility: CLINIC | Age: 88
End: 2023-10-24
Payer: MEDICARE

## 2023-10-24 VITALS
SYSTOLIC BLOOD PRESSURE: 102 MMHG | HEIGHT: 64 IN | DIASTOLIC BLOOD PRESSURE: 70 MMHG | HEART RATE: 74 BPM | OXYGEN SATURATION: 94 % | BODY MASS INDEX: 28 KG/M2 | WEIGHT: 164 LBS

## 2023-10-24 DIAGNOSIS — I35.0 AORTIC STENOSIS, MODERATE: Primary | ICD-10-CM

## 2023-10-24 PROCEDURE — 99214 OFFICE O/P EST MOD 30 MIN: CPT | Performed by: INTERNAL MEDICINE

## 2023-10-24 NOTE — PROGRESS NOTES
[facility] Cardiology  Follow Up Visit  Amanda Glover  10/31/1932    VISIT DATE:  10/24/23    PCP:   Shamir Brooks MD  [address] [facility] [address]   [facility] [address]          CC:  Atrial Fibrillation      Problem List:  Atrial fibrillation  Aortic stenosis  Hypertension  CKD  Alzheimer's dementia  Previous hip fracture    Echo October 2023  Echo:    Left ventricular systolic function is normal. Left ventricular ejection fraction appears to be 51 - 55%.    Left ventricular wall thickness is consistent with mild concentric hypertrophy.    Mild aortic valve regurgitation is present.    Moderate to severe aortic valve stenosis is present.  Gradients are variable secondary to atrial fibrillation.  Mean  gradient average 30-35 mmHg    Moderate to severe tricuspid valve regurgitation is present.    Estimated right ventricular systolic pressure from tricuspid regurgitation is mildly elevated (35-45 mmHg).    There is a left pleural effusion.      History of Present Illness:  Amanda Glover  Is a 89 y.o. female with pertinent cardiac history detailed above.  Patient following up after hospitalization earlier this month.  Patient is maintaining sinus rhythm since her recent cardioversion.  She does endorse some dyspnea on exertion.  She resides at [facility] trying to walk different places she gets more short of breath.  No chest pain.  She does have some lower extremity edema that seems to be increased since hospital discharge.  On Lasix 40 mg daily.  Patient has moderate to severe aortic stenosis.  Discussed with her today options of further work-up for TAVR.  She is not interested in pursuing this.  She feels like current quality of life is acceptable.      Patient Active Problem List    Diagnosis Date Noted    Iron deficiency anemia 10/06/2023    Elevated troponin 10/01/2023    History of DVT (deep vein thrombosis) - 2020 10/01/2023    Alzheimer's dementia 10/01/2023    CKD (chronic kidney disease),  stage III 10/01/2023    Elevated serum creatinine 10/01/2023    Elevated transaminase level 10/01/2023    Community acquired pneumonia of right lung 10/01/2023    Hypertension 03/18/2020    Aortic stenosis, moderate 03/18/2020     Note Last Updated: 3/18/2020     1 echo 3-16-20:   There is moderate calcification of the aortic valve.Mild aortic valve regurgitation is present. Mild aortic valve stenosis is present  Aortic valve mean pressure gradient is 9.0 mmHg. Aortic valve area is 1.4 cm2. Aortic valve dimensionless index is 0.4.      Acute deep vein thrombosis (DVT) of popliteal vein of both lower extremities 03/17/2020     Note Last Updated: 3/17/2020     3/17/2020: Started on Eliquis.      New onset a-fib 03/16/2020       Allergies   Allergen Reactions    Influenza Vaccine Live Other (See Comments)     Patient unsure       Social History     Socioeconomic History    Marital status:    Tobacco Use    Smoking status: Never    Smokeless tobacco: Never   Vaping Use    Vaping Use: Never used   Substance and Sexual Activity    Alcohol use: Never    Drug use: Never    Sexual activity: Defer       Family History   Problem Relation Age of Onset    Dementia Father        Current Medications:    Current Outpatient Medications:     acetaminophen (TYLENOL) 500 MG tablet, Take 1 tablet by mouth Every 6 (Six) Hours As Needed for Mild Pain, Headache or Fever., Disp: , Rfl:     amiodarone (Pacerone) 200 MG tablet, Take 1 tablet by mouth Daily for 30 days., Disp: 30 tablet, Rfl: 0    apixaban (ELIQUIS) 2.5 MG tablet tablet, Take 1 tablet by mouth 2 (Two) Times a Day for 30 days. Indications: Atrial Fibrillation, history of DVT/PE, Disp: 60 tablet, Rfl: 0    furosemide (LASIX) 40 MG tablet, Take 1 tablet by mouth Daily., Disp: , Rfl:     sennosides-docusate (senna-docusate sodium) 8.6-50 MG per tablet, Take 1 tablet by mouth Daily., Disp: , Rfl:      Review of Systems   Cardiovascular:  Positive for dyspnea on exertion and  "leg swelling.       Vitals:    10/24/23 1441   BP: 102/70   BP Location: Right arm   Patient Position: Sitting   Cuff Size: Adult   Pulse: 74   SpO2: 94%   Weight: 74.4 kg (164 lb)   Height: 162.6 cm (64\")       Physical Exam  Cardiovascular:      Rate and Rhythm: Regular rhythm.      Heart sounds: Murmur (Harsh 3/6 systolic murmur) heard.   Pulmonary:      Effort: Pulmonary effort is normal.   Musculoskeletal:      Right lower leg: No edema.      Left lower leg: No edema.   Neurological:      General: No focal deficit present.         Diagnostic Data:  Procedures  No results found for: \"CHLPL\", \"TRIG\", \"HDL\", \"LDLDIRECT\"  Lab Results   Component Value Date    GLUCOSE 90 10/06/2023    BUN 41 (H) 10/06/2023    CREATININE 1.28 (H) 10/06/2023     10/06/2023    K 3.9 10/06/2023     10/06/2023    CO2 29.0 10/06/2023     Lab Results   Component Value Date    HGBA1C 5.40 10/02/2023     Lab Results   Component Value Date    WBC 6.75 10/06/2023    HGB 10.0 (L) 10/06/2023    HCT 31.8 (L) 10/06/2023     10/06/2023       Assessment:  No diagnosis found.    Plan:    Atrial fibrillation with RVR  Patient has been maintained on Eliquis 2.5 mg daily based on age and renal function typically 1.5 or greater  Continue p.o. amiodarone  Status post cardioversion October admission  Maintaining sinus rhythm  Moderate to severe aortic stenosis  Aortic valve mean gradient 30 to 35 mmHg  May be contributing to patient's heart failure symptoms  Would defer further work-up for TAVR at this time, given  current goals of care.  Reconfirmed with the patient today.  3.  Acute on chronic heart failure with preserved ejection fraction  EF 51 to 55%  Lasix 40 mg p.o. daily.  Can take an additional Lasix if 2 pound weight gain in 48-hour time span.  Wrote orders to [facility] regarding this  CKD  Creatinine 1.28    Follow-up 6 months    ACP discussion was held with the patient during this visit. Patient has an advance directive " in EMR which is still valid.       Denis Chand MD FACC

## 2024-01-01 ENCOUNTER — APPOINTMENT (OUTPATIENT)
Dept: CT IMAGING | Facility: HOSPITAL | Age: 89
DRG: 064 | End: 2024-01-01
Payer: MEDICARE

## 2024-01-01 ENCOUNTER — HOSPITAL ENCOUNTER (INPATIENT)
Facility: HOSPITAL | Age: 89
LOS: 1 days | DRG: 064 | End: 2024-03-01
Attending: EMERGENCY MEDICINE | Admitting: INTERNAL MEDICINE
Payer: MEDICARE

## 2024-01-01 ENCOUNTER — APPOINTMENT (OUTPATIENT)
Dept: GENERAL RADIOLOGY | Facility: HOSPITAL | Age: 89
DRG: 064 | End: 2024-01-01
Payer: MEDICARE

## 2024-01-01 VITALS
TEMPERATURE: 96.8 F | SYSTOLIC BLOOD PRESSURE: 129 MMHG | BODY MASS INDEX: 29.86 KG/M2 | WEIGHT: 179.23 LBS | OXYGEN SATURATION: 56 % | HEIGHT: 65 IN | DIASTOLIC BLOOD PRESSURE: 77 MMHG | RESPIRATION RATE: 20 BRPM

## 2024-01-01 DIAGNOSIS — I61.2 NONTRAUMATIC HEMORRHAGE OF LEFT CEREBRAL HEMISPHERE: Primary | ICD-10-CM

## 2024-01-01 DIAGNOSIS — I16.1 HYPERTENSIVE EMERGENCY: ICD-10-CM

## 2024-01-01 PROCEDURE — 25010000002 MIDAZOLAM PER 1 MG: Performed by: INTERNAL MEDICINE

## 2024-01-01 PROCEDURE — 70450 CT HEAD/BRAIN W/O DYE: CPT

## 2024-01-01 PROCEDURE — 99291 CRITICAL CARE FIRST HOUR: CPT | Performed by: INTERNAL MEDICINE

## 2024-01-01 PROCEDURE — 99222 1ST HOSP IP/OBS MODERATE 55: CPT

## 2024-01-01 PROCEDURE — 71045 X-RAY EXAM CHEST 1 VIEW: CPT

## 2024-01-01 PROCEDURE — 99291 CRITICAL CARE FIRST HOUR: CPT

## 2024-01-01 PROCEDURE — 25010000002 GLYCOPYRROLATE 0.2 MG/ML SOLUTION

## 2024-01-01 PROCEDURE — 25010000002 MORPHINE PER 10 MG: Performed by: INTERNAL MEDICINE

## 2024-01-01 PROCEDURE — 25810000003 SODIUM CHLORIDE 0.9 % SOLUTION 250 ML FLEX CONT

## 2024-01-01 PROCEDURE — 25010000002 NICARDIPINE 2.5 MG/ML SOLUTION 10 ML VIAL

## 2024-01-01 RX ORDER — MORPHINE SULFATE 2 MG/ML
1 INJECTION, SOLUTION INTRAMUSCULAR; INTRAVENOUS
Status: DISCONTINUED | OUTPATIENT
Start: 2024-01-01 | End: 2024-01-01 | Stop reason: HOSPADM

## 2024-01-01 RX ORDER — SODIUM CHLORIDE 9 MG/ML
40 INJECTION, SOLUTION INTRAVENOUS AS NEEDED
OUTPATIENT
Start: 2024-01-01

## 2024-01-01 RX ORDER — SODIUM CHLORIDE 0.9 % (FLUSH) 0.9 %
10 SYRINGE (ML) INJECTION AS NEEDED
OUTPATIENT
Start: 2024-01-01

## 2024-01-01 RX ORDER — HYDRALAZINE HYDROCHLORIDE 20 MG/ML
10 INJECTION INTRAMUSCULAR; INTRAVENOUS ONCE
Status: DISCONTINUED | OUTPATIENT
Start: 2024-01-01 | End: 2024-01-01

## 2024-01-01 RX ORDER — MIDAZOLAM HYDROCHLORIDE 1 MG/ML
1 INJECTION INTRAMUSCULAR; INTRAVENOUS
Status: DISCONTINUED | OUTPATIENT
Start: 2024-01-01 | End: 2024-01-01 | Stop reason: HOSPADM

## 2024-01-01 RX ORDER — SODIUM CHLORIDE 0.9 % (FLUSH) 0.9 %
10 SYRINGE (ML) INJECTION EVERY 12 HOURS SCHEDULED
OUTPATIENT
Start: 2024-01-01

## 2024-01-01 RX ORDER — GLYCOPYRROLATE 0.2 MG/ML
0.4 INJECTION INTRAMUSCULAR; INTRAVENOUS EVERY 6 HOURS PRN
Status: DISCONTINUED | OUTPATIENT
Start: 2024-01-01 | End: 2024-01-01 | Stop reason: HOSPADM

## 2024-01-01 RX ORDER — SODIUM CHLORIDE 0.9 % (FLUSH) 0.9 %
10 SYRINGE (ML) INJECTION AS NEEDED
Status: DISCONTINUED | OUTPATIENT
Start: 2024-01-01 | End: 2024-01-01 | Stop reason: HOSPADM

## 2024-01-01 RX ADMIN — MORPHINE SULFATE 1 MG: 2 INJECTION, SOLUTION INTRAMUSCULAR; INTRAVENOUS at 09:35

## 2024-01-01 RX ADMIN — MORPHINE SULFATE 1 MG: 2 INJECTION, SOLUTION INTRAMUSCULAR; INTRAVENOUS at 15:06

## 2024-01-01 RX ADMIN — NICARDIPINE HYDROCHLORIDE 5 MG/HR: 25 INJECTION, SOLUTION INTRAVENOUS at 05:35

## 2024-01-01 RX ADMIN — MORPHINE SULFATE 1 MG: 2 INJECTION, SOLUTION INTRAMUSCULAR; INTRAVENOUS at 17:10

## 2024-01-01 RX ADMIN — MIDAZOLAM HYDROCHLORIDE 1 MG: 1 INJECTION, SOLUTION INTRAMUSCULAR; INTRAVENOUS at 11:27

## 2024-01-01 RX ADMIN — MIDAZOLAM HYDROCHLORIDE 1 MG: 1 INJECTION, SOLUTION INTRAMUSCULAR; INTRAVENOUS at 17:51

## 2024-01-01 RX ADMIN — MORPHINE SULFATE 1 MG: 2 INJECTION, SOLUTION INTRAMUSCULAR; INTRAVENOUS at 12:54

## 2024-01-01 RX ADMIN — GLYCOPYRROLATE 0.4 MG: 0.2 INJECTION INTRAMUSCULAR; INTRAVENOUS at 15:15

## 2024-03-01 PROBLEM — I61.9 ICH (INTRACEREBRAL HEMORRHAGE): Status: ACTIVE | Noted: 2024-01-01

## 2024-03-01 PROBLEM — I16.1 HYPERTENSIVE EMERGENCY: Status: ACTIVE | Noted: 2024-01-01

## 2024-03-01 PROBLEM — I61.9 LEFT-SIDED NONTRAUMATIC INTRACEREBRAL HEMORRHAGE: Status: ACTIVE | Noted: 2024-01-01

## 2024-03-01 PROBLEM — G93.40 ACUTE ENCEPHALOPATHY: Status: ACTIVE | Noted: 2024-01-01

## 2024-03-01 NOTE — PROGRESS NOTES
Visited patient per RN referral.  Family gathered at bedside, all supportive of patient's move to comfort measures, supportive of each other.  Patient's  came in earlier this morning, prayed with family.  Will continue to follow and be supportive as needed.

## 2024-03-01 NOTE — PROGRESS NOTES
Critical care update:    I was able to have a conversation with the patient's family at the bedside.  At this time they would like to transition to full comfort measures and ensure the patient's comfort.  I have placed orders to this effect.  We will have her transition to a palliative care bed as soon as one is available.

## 2024-03-01 NOTE — ED PROVIDER NOTES
Subjective   History of Present Illness  Is a 89-year-old female presented to the emergency department with some altered mental status.  Patient is coming from her assisted living facility.  Apparently she woke up and was complaining of severe headache.  She proceeded to vomit and then she was unconscious.  EMS was called at that time.  When they arrived she was significantly hypertensive.  The patient was not responding.  Patient is a DO NOT RESUSCITATE.  No other history could be obtained given the patient's severe clinical status.    History provided by:  EMS personnel and nursing home  History limited by:  Acuity of condition   used: No        Review of Systems   Unable to perform ROS: Mental status change       No past medical history on file.    Allergies   Allergen Reactions    Haemophilus Influenzae Vaccines Unknown - High Severity       No past surgical history on file.    No family history on file.    Social History     Socioeconomic History    Marital status:            Objective   Physical Exam  Vitals and nursing note reviewed.   Constitutional:       General: She is in acute distress.      Appearance: She is ill-appearing.   Eyes:      Comments: Pupils are unequal, sluggish to respond   Cardiovascular:      Rate and Rhythm: Normal rate and regular rhythm.   Pulmonary:      Effort: Pulmonary effort is normal. No respiratory distress.   Abdominal:      General: Abdomen is flat. There is no distension.   Musculoskeletal:      Comments: Patient is not following commands   Skin:     General: Skin is warm.      Findings: No rash.   Neurological:      Comments: Patient is currently obtunded, unable to complete neurologic exam.         Procedures           ED Course  ED Course as of 03/01/24 0616   Fri Mar 01, 2024   0545 BP(!): 220/129 [JK]   0545 Temp: 98.2 °F (36.8 °C) [JK]   0545 Temp src: Axillary [JK]   0545 Heart Rate: 75 [JK]   0545 Resp: 16 [JK]   0545 SpO2: 96 %  Patient's  repeat vitals, telemetry tracing, and pulse oximetry tracing were directly viewed and interpreted by myself.  Normal sinus rhythm [JK]   0545 Patient was severely hypertensive on presentation with coincides with her intracranial bleeding.  We did place the patient on nicardipine drip for blood pressure modulation.  Remains on continuous telemetry monitoring [JK]   0545 I did have discussion with the daughters who are at bedside.  They are notified about the patient's severe clinical status. [JK]   0546 XR Chest 1 View [JK]   0546 CT Head Without Contrast Stroke Protocol  Imaging was directly visualized by myself, per my interpretations, chest x-ray shows some cardiomegaly with pulmonary vascular congestion.  CT of the head showed large intracranial intraparenchymal bleed with midline shift.. [JK]   0546 Patient has large intraparenchymal bleed.  We did have discussion with neurosurgery regarding the findings.  This does not appear to be amenable to acute intervention.  Will proceed with comfort care and medical management. [JK]   0612 BP: 146/76  Patient's blood pressure is normalizing.  We will slightly pull back on the nicardipine drip. [JK]   0614 Patient will be admitted to the ICU in critical condition.  I did speak with the intensivist who has accepted the admission. [JK]   0614 Shared decision making:   After full review of the patient's clinical presentation, review of any work-up including but not limited to laboratory studies and radiology obtained, I had a discussion with the patient's family.  Treatment options were discussed as well as the risks, benefits and consequences.  I discussed all findings with the patient's family.  During the discussion, treatment goals were understood by all as well as any misconceptions which were addressed with the patient's family.  Ample time was given for any questions they may have had.  They are in agreement with the treatment plan as well as final disposition.   [JK]       ED Course User Index  [JK] Mack Lord MD                          Total (NIH Stroke Scale): 42                  Medical Decision Making  This is a 89-year-old female with a history of hypertension presented to the emergency department with some altered mental status.  The patient apparently was complaining of a headache proceeded to vomit and then became unresponsive.  She is significantly hypertensive on presentation.  The patient's symptoms are extremely consistent with intracranial bleeding.  However, the patient is DO NOT RESUSCITATE.  The patient was seen as a code stroke on arrival.  She was immediately transferred to CT scan for workup.  IV access was established.  Placed on continuous telemetry monitoring.  Workup initiated.      Differential diagnosis: []      Amount and/or Complexity of Data Reviewed  Independent Historian: EMS     Details: Patient is currently obtunded and unable provide history.  We did have discussion with nursing home staff as well as EMS about the patient's presentation and clinical course.  External Data Reviewed: labs, radiology and notes.     Details: External laboratories, imaging as well as notes were reviewed personally by myself.  All relevant studies were used to guide decision making.     Date of previous record: 3/1/2024    Source of note: Nursing home records    Summary: We did review hardcopy to the patient's nursing home records as well as her most recent progress note.  I did review basic laboratory studies on file as well as a previous chest x-ray.  Records reviewed    Labs: ordered. Decision-making details documented in ED Course.  Radiology: ordered and independent interpretation performed. Decision-making details documented in ED Course.  ECG/medicine tests: ordered and independent interpretation performed. Decision-making details documented in ED Course.  Discussion of management or test interpretation with external provider(s): I did have discussion  with the stroke team, neurosurgery as well as the intensivist about the patient.  We did discuss goals of care as well as medical management.    Risk  Prescription drug management.  Decision regarding hospitalization.    Critical Care  Total time providing critical care: 72 minutes (Authorized and performed by: Mack Lord MD  I personally spent a total of 72 minutes of critical care time with the patient.  Due to the high probability of clinically significant, life-threatening deterioration, the patient required my highest level of care to intervene emergently.  These interventions, including, but not limited to, establishing IV access, continuous pulse oximeter and telemetry monitoring, frequent monitoring and reevaluations, management the patient's airway and cardiovascular system, discussion with other consultants as needed, which bear directly on the management the patient.  This also includes obtaining history, examining the patient, frequent reevaluations and coordinating high level of care.  Failure to emergently initiate these interventions would carry a high probability of resulting in sudden, clinically significant or life threatening deterioration in the patient's condition.  This does not include time spent on separately reported billable procedures.)        Final diagnoses:   Nontraumatic hemorrhage of left cerebral hemisphere   Hypertensive emergency       ED Disposition  ED Disposition       ED Disposition   Decision to Admit    Condition   --    Comment   Level of Care: Critical Care [6]   Admitting Physician: OSWALDO THOMAS [[id number]]                 No follow-up provider specified.       Medication List      No changes were made to your prescriptions during this visit.            Mack Lord MD  03/01/24 0616

## 2024-03-01 NOTE — NURSING NOTE
Family requested to 'cut off' bra from patient for comfort. Nursing staff was able to remove bra per family.

## 2024-03-01 NOTE — SIGNIFICANT NOTE
Exam confirms with auscultation zero audible heart tones and zero audible respirations. Ms.Wilma Glover was pronounced dead at 1800.  MD notified by Patient's RN.    Andres Ratliff RN  Clinical House Supervisor  3/1/2024 18:21 EST

## 2024-03-01 NOTE — NURSING NOTE
Pt arrived to unit around 0635. Pt settled and family brought to bedside. Support provided. Cardene restarted. NIHSS 37.

## 2024-03-01 NOTE — CONSULTS
Stroke Consult Note    Patient Name: Amanda Glover   MRN: [id number]  Age: 89 y.o.  Sex: female  : 10/31/1932    Primary Care Physician: No primary care provider on file.  Referring Physician: Dr. Lord    TIME STROKE TEAM CALLED: 445 EST     TIME PATIENT SEEN: 446 EST    Handedness: Unknown  Race: White    Chief Complaint/Reason for Consultation: Altered mental status    HPI: Ms. Glover is a 89-year-old female with PMH of anxiety, A-fib (on Eliquis), HTN, HLD, CHF and dementia.  She presents [facility] ED with new onset of altered mental status from [facility].  EMS reports patient woke up at approximately 3 AM complaining of severe headache and had an episode of incontinence.  Patient proceeded to vomit and became unresponsive.  EMS was notified and patient was brought to [facility] ED for further evaluation of presenting symptoms.      Initial NIH 26.  ICH 4.  Blood pressure 220/129.  On exam patient is unable to answer questions appropriately or follow one-step commands.  Strength in all extremities 1/5.  Patient moves bilateral lower extremities to noxious stimuli.  Neuro exam severely limited due to acuity of condition.    Last Known Normal Date/Time: 2024 at 1800 EST     Review of Systems   Unable to perform ROS: Patient unresponsive      No past medical history on file.  No past surgical history on file.  No family history on file.     Allergies   Allergen Reactions    Haemophilus Influenzae Vaccines Unknown - High Severity     Prior to Admission medications    Not on File         Temp:  [98.2 °F (36.8 °C)] 98.2 °F (36.8 °C)  Heart Rate:  [75] 75  Resp:  [16] 16  BP: (220)/(129) 220/129  Neurological Exam  Mental Status   Level of consciousness: Unresponsive. Orientation: Disoriented. Patient is nonverbal. Expressive aphasia and receptive aphasia present.    Cranial Nerves  CN II: Vision test: Right pupil pinpoint, left pupil 3, nonreactive. No blink to threat bilateral.  CN III,  IV, VI: Pupils nonreactive.  Neuro exam severely limited due to acuity of condition..    Motor  Normal muscle bulk throughout. Normal muscle tone.  Strength in all extremities 1/5..    Sensory  Light touch abnormality: Sensation: Patient moves bilateral lower extremities to noxious stimuli only.     Coordination    Unable to assess.    Gait    Unable to assess.      Physical Exam  Constitutional:       Comments: Unresponsive   HENT:      Head: Normocephalic and atraumatic.      Nose: Nose normal.      Mouth/Throat:      Mouth: Mucous membranes are dry.   Eyes:      Comments: Right pupil pinpoint, left pupil 3, nonreactive   Cardiovascular:      Pulses: Normal pulses.   Pulmonary:      Effort: Pulmonary effort is normal.   Abdominal:      General: Abdomen is flat.   Musculoskeletal:         General: Normal range of motion.      Cervical back: Normal range of motion.   Skin:     General: Skin is warm and dry.   Neurological:      Mental Status: She is disoriented.      Sensory: Sensory deficit present.      Motor: Weakness present.   Psychiatric:      Comments: Unresponsive         Acute Stroke Data    Thrombolytic Inclusion / Exclusion Criteria    Time: 05:32 EST  Person Administering Scale: PATRICIO Perdue    Inclusion Criteria  [x]   18 years of age or greater   []   Onset of symptoms < 4.5 hours before beginning treatment (stroke onset = time patient was last seen well or without symptoms).   []   Diagnosis of acute ischemic stroke causing measurable disabling deficit (Complete Hemianopia, Any Aphasia, Visual or Sensory Extinction, Any weakness limiting sustained effort against gravity)   []   Any remaining deficit considered potentially disabling in view of patient and practitioner   Exclusion criteria (Do not proceed with Alteplase if any are checked under exclusion criteria)  [x]   Onset unknown or GREATER than 4.5 hours   [x]   ICH on CT/MRI   []   CT demonstrates hypodensity representing acute or subacute  infarct   []   Significant head trauma or prior stroke in the previous 3 months   []   Symptoms suggestive of subarachnoid hemorrhage   []   History of un-ruptured intracranial aneurysm GREATER than 10 mm   []   Recent intracranial or intraspinal surgery within the last 3 months   []   Arterial puncture at a non-compressible site in the previous 7 days   []   Active internal bleeding   []   Acute bleeding tendency   []   Platelet count LESS than 100,000 for known hematological diseases such as leukemia, thrombocytopenia or chronic cirrhosis   []   Current use of anticoagulant with INR GREATER than 1.7 or PT GREATER than 15 seconds, aPTT GREATER than 40 seconds   []   Heparin received within 48 hours, resulting in abnormally elevated aPTT GREATER than upper limit of normal   []   Current use of direct thrombin inhibitors or direct factor Xa inhibitors in the past 48 hours   []   Elevated blood pressure refractory to treatment (systolic GREATER than 185 mm/Hg or diastolic  GREATER than 110 mm/Hg   []   Suspected infective endocarditis and aortic arch dissection   []   Current use of therapeutic treatment dose of low-molecular-weight heparin (LMWH) within the previous 24 hours   []   Structural GI malignancy or bleed   Relative exclusion for all patients  []   Only minor non-disabling symptoms   []   Pregnancy   []   Seizure at onset with postictal residual neurological impairments   []   Major surgery or previous trauma within past 14 days   []   History of previous spontaneous ICH, intracranial neoplasm, or AV malformation   []   Postpartum (within previous 14 days)   []   Recent GI or urinary tract hemorrhage (within previous 21 days)   []   Recent acute MI (within previous 3 months)   []   History of un-ruptured intracranial aneurysm LESS than 10 mm   []   History of ruptured intracranial aneurysm   []   Blood glucose LESS than 50 mg/dL (2.7 mmol/L)   []   Dural puncture within the last 7 days   []   Known GREATER  than 10 cerebral microbleeds   Additional exclusions for patients with symptoms onset between 3 and 4.5 hours.  []   Age > 80.   []   On any anticoagulants regardless of INR  >>> Warfarin (Coumadin), Heparin, Enoxaparin (Lovenox), fondaparinux (Arixtra), bivalirudin (Angiomax), Argatroban, dabigatran (Pradaxa), rivaroxaban (Xarelto), or apixaban (Eliquis)   []   Severe stroke (NIHSS > 25).   []   History of BOTH diabetes and previous ischemic stroke.   []   The risks and benefits have been discussed with the patient or family related to the administration of IV thrombolytic therapy for stroke symptoms.   []   I have discussed and reviewed the patient's case and imaging with the attending prior to IV thrombolytic therapy.   NA Time IV thrombolytic administered       Hospital Meds:  Scheduled-    Infusions- niCARdipine, 5-15 mg/hr       PRNs-   sodium chloride    Functional Status Prior to Current Stroke/Hanalei Score:   MODIFIED JENI SCALE (to be assessed for each patient having history of stroke) []Stroke history but not assessed  []0: No symptoms at all  []1: No significant disability despite symptoms  [x]2: Slight disability  []3: Moderate disability  []4: Moderately severe disability  []5: Severe disability  []6: Death        NIH Stroke Scale  Time: 05:32 EST  Person Administering Scale: PATRICIO Perdue    1a  Level of consciousness: 3=responds only with reflex motor or automatic effects or totally unresponsive, flaccid, areflexic   1b. LOC questions:  2=Answers neither task correctly   1c. LOC commands: 2=Performs neither task correctly   2.  Best Gaze: 0=normal   3.  Visual: 0=No visual loss   4. Facial Palsy: 0=Normal symmetric movement   5a.  Motor left arm: 3=No effort against gravity, limb falls   5b.  Motor right arm: 3=No effort against gravity, limb falls   6a. motor left leg: 3=No effort against gravity, limb falls   6b  Motor right leg:  3=No effort against gravity, limb falls   7. Limb Ataxia:  0=Absent   8.  Sensory: 2=Severe to total sensory loss; patient is not aware of being touched in face, arm, leg   9. Best Language:  3=Mute, global aphasia; no usable speech or auditory comprehension   10. Dysarthria: 2=Severe; patient speech is so slurred as to be unintelligible in the absence of or our of proportion to any dysphagia, or is mute/anarthric   11. Extinction and Inattention: 0=No abnormality    Total:   26   ICH Score:  1 Point (GCS 5 to 12)  1 Point (ICH volume =/>30 cm3)  1 Point (Intraventricular Extension Present)   0 Points (Infratentorial Origin - No )  1 Point (Age =/> 80 years)  The total ICS Score for this patient is 4 at 05:33 EST on 03/01/24     Results Reviewed:  I have personally reviewed current lab, radiology, and data and agree with results.      XR Chest 1 View    Result Date: 3/1/2024  Impression: Cardiomegaly. Bilateral pleural effusions. Bibasilar atelectasis. Right upper lobe airspace disease could be atelectasis or pneumonia. Electronically Signed: Enio Gomez MD  3/1/2024 5:17 AM EST  Workstation ID: [id number]    CT Head Without Contrast Stroke Protocol    Result Date: 3/1/2024  Impression: Massive left parieto-occipital parenchymal hematoma with 2.1 cm of midline shift from left to right. There is subfalcine herniation anteriorly. There is a large left frontal and temporal subdural hematoma. Electronically Signed: Enio Gomez MD  3/1/2024 4:58 AM EST  Workstation ID: [id number]       Assessment/Plan:  Ms. Glover is a 89-year-old female with PMH of anxiety, A-fib (on Eliquis), HTN, HLD, CHF and dementia.  She presents [facility] ED with new onset of altered mental status from [facility].  EMS reports patient woke up at approximately 3 AM complaining of severe headache and had an episode of incontinence.  Patient proceeded to vomit and became unresponsive.  EMS was notified and patient was brought to [facility] ED for further evaluation of presenting symptoms.  Patient is not a candidate for IV thrombolytics or endovascular therapy due to large parenchymal hemorrhage.    Antiplatelet PTA: None  Anticoagulant PTA: Eliquis        Large parietal occipital parenchymal hematoma  Initiate hemorrhagic stroke order set  Strict n.p.o.  Bedrest  Blood pressure goals, SBP less than 140  Cardene drip for BP management  Case management to follow  Comfort care per hospital team      Plan of care discussed with Dr. Lord, Dr. Allen, family at bedside and primary RN.  Stroke neurology will continue to follow.  Thank you for this consult.  Call with any questions or concerns.    Silas Byrne, APRN  March 1, 2024  05:32 EST

## 2024-03-01 NOTE — H&P
Intensive Care H&P     Hospital:  LOS: 0 days   Ms. Amanda Glover, 89 y.o. female is followed for:   <principal problem not specified>            History of present illness:   89-year-old female with past medical history of anxiety, A-fib on anticoagulation with Eliquis, difficult to control hypertension, dyslipidemia, CHF and dementia.  Patient lives at a nursing home in [place].  Patient's daughter she saw her late last evening.  Patient apparently was not eating well yesterday.  Patient is aware of her surroundings but has underlying dementia.  According to daughter patient has been intermittently falling while dressing as well.  Family got call earlier today from nursing home around 3 AM that patient called for help inside the room and when nursing staff went and patient was complaining of severe headache and episode of incontinence.  Patient also had episode of vomiting and then after that she became unresponsive.  Patient was brought to [facility] ED for further evaluation.  Patient was evaluated by ED physician as well as neurology and neurosurgery.  Initial NIHSS was 26.  Head CT scan showed large left parieto-occipital hemorrhage with extension into the ventricles with more than 2 cm midline shift from left to right.  Subfalcine herniation noted as well.  There was left frontal and temporal subdural hematoma noted.  Neurosurgery team was consulted and their evaluation is that this is a catastrophic bleed and nothing can be done neurosurgically.  Discussed with ED physician and attempting to reverse anticoagulation was considered futile and not entertained.  Patient is on nicardipine drip.  Blood pressure slowly improving.  Goal blood pressure less than 140 systolic.    I met with patient's daughters at bedside.  They are obviously distraught.  Discussed with them regarding fatal nature of this disease condition.  They are waiting for rest of the family members to get here and trying to contact  "them.  We discussed CODE STATUS and patient will be DNR/DNI.  Will focus more on comfort at this point.          The patient's past medical, surgical and social history were reviewed and updated in Epic as appropriate.       Objective     171/82, 14, 60    Infusions:  niCARdipine, 5-15 mg/hr, Last Rate: 5 mg/hr (03/01/24 0516)      Medications:       Vital Sign Min/Max for last 24 hours  Temp  Min: 98.2 °F (36.8 °C)  Max: 98.2 °F (36.8 °C)   BP  Min: 220/129  Max: 220/129   Pulse  Min: 75  Max: 75   Resp  Min: 16  Max: 16   SpO2  Min: 96 %  Max: 96 %   No data recorded       Input/Output for last 24 hour shift  No intake/output data recorded.      Objective:  Vital signs: (most recent): Blood pressure (!) 220/129, pulse 75, temperature 98.2 °F (36.8 °C), temperature source Axillary, resp. rate 16, height 165.1 cm (65\"), weight 81.3 kg (179 lb 3.7 oz), SpO2 96%.            General Appearance: Obtunded.  Head:   Atraumatic, Normocephalic, without obvious abnormality, Pupils right 2 mm and left 4 mm, non reactive.   Lungs:   B/L Breath sounds present with decreased breath sounds on bases, no wheezing heard, no crackles.   Heart: S1 and S2 present, no murmur,  Abdomen: Soft, nontender, no guarding or rigidity, bowel sounds positive.  Extremities:   + edema, warm to touch.  Neurologic:  extensor response to painful stimuli.  Comatose.  Intermittent jerking noted in upper extremities.                Invalid input(s): \"CHLORIDE\"  CrCl cannot be calculated (No successful lab value found.).          Images:   Chest x-ray reviewed personally and showed cardiomegaly with probable small pleural effusions bilaterally.  No significant infiltrative process noted.  Rotated film.    I reviewed the patient's results and images.     Head CT scan reviewed  Findings:  There is massive left parieto-occipital parenchymal hematoma measuring up to 3.8 x 8.6 x 5.9 cm. There is complete effacement of the posterior horn of the left lateral " ventricle from the large hemorrhage. There is marked shift of the midline structures   from the left to the right measuring up to 2.1 cm. The anterior horn of the left lateral ventricle is effaced.     There is abnormal extra-axial fluid in the left frontal and temporal regions from subdural hematoma, mixed density with an acute component measuring up to at least 1.9 cm. There is midline parenchymal hematoma. There is subfalcine herniation anteriorly.   There is hemorrhage within the third ventricle but the third ventricle is not significantly effaced. There is no definite uncal herniation at this time. There is however significant mass effect on the uncus on the left.     IMPRESSION:  Impression:  Massive left parieto-occipital parenchymal hematoma with 2.1 cm of midline shift from left to right. There is subfalcine herniation anteriorly. There is a large left frontal and temporal subdural hematoma.           Electronically Signed: Enio Gomez MD    3/1/2024 4:58 AM EST    Workstation ID: [id number]      Assessment & Plan   Impression        Acute encephalopathy    Left-sided nontraumatic intracerebral hemorrhage    Hypertensive emergency       Plan        1.  Admit to intensive care unit  2.  Patient with catastrophic left parieto-occipital intracranial hemorrhage with intraventricular extension.  Patient is already showing signs of herniation.  Not candidate for surgical intervention.  Due to futile nature of this anticoagulation was not reversed.  Neurology team also evaluated the patient.  3.  Continue on nicardipine drip for now and attempting to get blood pressure less than 140 systolic.  4.  Keep head of bed elevated 30 degree and neck in midline position.  5.  NPO.    Met with patient's daughter and updated her about current condition and overall poor prognosis.  Family is aware that patient is  not going to survive this.  Discussed CODE STATUS and patient is DNR/DNI.  We will focus more on comfort.  Will  wait for rest of the family to get together.      Plan of care and goals reviewed with multidisciplinary/antibiotic stewardship team during rounds.   I discussed the patient's findings and my recommendations with family and consulting provider       Time spent Critical care 30 min (exclusive of procedure time)  including high complexity decision making to assess, manipulate, and support vital organ system failure in this individual who has impairment of one or more vital organ systems such that there is a high probability of imminent or life threatening deterioration in the patient’s condition.      Meng Ndiaye MD, FCCP  Pulmonary, Critical care and Sleep Medicine

## 2024-03-01 NOTE — CASE MANAGEMENT/SOCIAL WORK
Continued Stay Note  [facility]     Patient Name: Amanda Gloevr  MRN: [id number]  Today's Date: 3/1/2024    Admit Date: 3/1/2024    Plan: Update   Discharge Plan       Row Name 03/01/24 1354       Plan    Plan Update    Plan Comments Discussed patient in MDR.  Patient is now comfort measures.  Initial discharge planning assessment deferred.  CM will continue to follow.    Final Discharge Disposition Code 30 - still a patient                   Discharge Codes    No documentation.                       Nanci Israel RN

## 2024-03-01 NOTE — Clinical Note
Level of Care: Critical Care [6]   Diagnosis: ICH (intracerebral hemorrhage) [621201]   Certification: I Certify That Inpatient Hospital Services Are Medically Necessary For Greater Than 2 Midnights

## 2024-03-13 NOTE — DISCHARGE SUMMARY
Death Summary         Patient Name: [unfilled]    [facility]: [id number]    MRN: [id number]    : 10/31/1932    Date of Admission: 3/1/2024    Date/Time of Death: 3/1800    Admitting Physician:  Meng Ndiaye MD    Primary Care Provider: Shamir Brooks MD    Consultations:     Mr Jud Borges, Neurology    Admission Diagnosis:     Diagnoses at Time of Death:     ICH (intracerebral hemorrhage)    Acute encephalopathy    Left-sided nontraumatic intracerebral hemorrhage    Hypertensive emergency        Procedures:  NA         HPI and Hospital course     History of Present Illness and hospital course:  89-year-old female with past medical history of anxiety, A-fib on anticoagulation with Eliquis, difficult to control hypertension, dyslipidemia, CHF and dementia.  Patient lives at a nursing home in [place].  Patient's daughter she saw her late last evening.  Patient apparently was not eating well yesterday.  Patient is aware of her surroundings but has underlying dementia.  According to daughter patient has been intermittently falling while dressing as well.  Family got call earlier today from nursing home around 3 AM that patient called for help inside the room and when nursing staff went and patient was complaining of severe headache and episode of incontinence.  Patient also had episode of vomiting and then after that she became unresponsive.  Patient was brought to [facility] ED for further evaluation.  Patient was evaluated by ED physician as well as neurology and neurosurgery.  Initial NIHSS was 26.  Head CT scan showed large left parieto-occipital hemorrhage with extension into the ventricles with more than 2 cm midline shift from left to right.  Subfalcine herniation noted as well.  There was left frontal and temporal subdural hematoma noted.  Stroke Neurology Dr Borges and Neurosurgery team Dr Lam was consulted and their evaluation is that this is a catastrophic bleed and nothing  can be done neurosurgically.  Discussed with ED physician and attempting to reverse anticoagulation was considered futile and not entertained.  Patient was started on nicardipine drip. Goal blood pressure less than 140 systolic.     Met with family. Discussed with them regarding fatal nature of this disease condition.  They are waiting for rest of the family members to get here and trying to contact them.  We discussed CODE STATUS and patient will be DNR/DNI. Decision was made to focus on comfort.    Family got together and ICU team had further discussions with them and family decided to move to comfort care measures.  Patient was liberated from artificial support and was transitioned to comfort care and passed away on March 1 at 1800 with family at bedside.         Meng Ndiaye MD  Pulmonary & Critical Care Medicine              Please note that portions of this note were completed with a voice recognition program.

## 2024-06-16 NOTE — PROGRESS NOTES
Discharge Planning Assessment  [facility]     Patient Name: Amanda Glover  MRN: [id number]  Today's Date: 3/17/2020    Admit Date: 3/16/2020    Discharge Needs Assessment     Row Name 03/17/20 0942       Living Environment    Lives With  alone    Current Living Arrangements  home/apartment/condo    Primary Care Provided by  self    Provides Primary Care For  no one    Family Caregiver if Needed  child(emily), adult    Family Caregiver Names  nAna Teja- dtr.     Quality of Family Relationships  supportive;involved    Able to Return to Prior Arrangements  yes       Resource/Environmental Concerns    Resource/Environmental Concerns  none    Transportation Concerns  car, none       Transition Planning    Patient/Family Anticipates Transition to  home;home with family    Patient/Family Anticipated Services at Transition  none    Transportation Anticipated  family or friend will provide       Discharge Needs Assessment    Concerns to be Addressed  denies needs/concerns at this time    Equipment Currently Used at Home  walker, rolling;wheelchair    Equipment Needed After Discharge  none    Provided Post Acute Provider List?  N/A    N/A Provider List Comment  Plan is to dc home        Discharge Plan     Row Name 03/17/20 0943       Plan    Plan  Home    Patient/Family in Agreement with Plan  yes    Plan Comments  Spoke with pt. and family at bedside. Pt. Lives alone in [facility]. IADL's PTA. Has a WC and RW. Denies additional needs at this time. Has a dtr. And family members who check on her frequently and help with shopping and cleaning. Plan at this time is to DC to home with family transporting.     Final Discharge Disposition Code  01 - home or self-care        Destination      Coordination has not been started for this encounter.      Durable Medical Equipment      Coordination has not been started for this encounter.      Dialysis/Infusion      Coordination has not been started for this encounter.      Home  Medical Care      Coordination has not been started for this encounter.      Therapy      Coordination has not been started for this encounter.      Community Resources      Coordination has not been started for this encounter.          Demographic Summary    No documentation.       Functional Status     Row Name 03/17/20 0941       Functional Status    Usual Activity Tolerance  moderate       Functional Status, IADL    Medications  independent    Meal Preparation  assistive person    Housekeeping  assistive person    Laundry  assistive person    Shopping  assistive person        Psychosocial    No documentation.       Abuse/Neglect    No documentation.       Legal    No documentation.       Substance Abuse    No documentation.       Patient Forms    No documentation.           Sejal Benitez RN     (M6) obeys commands